# Patient Record
Sex: MALE | Race: WHITE | Employment: OTHER | URBAN - METROPOLITAN AREA
[De-identification: names, ages, dates, MRNs, and addresses within clinical notes are randomized per-mention and may not be internally consistent; named-entity substitution may affect disease eponyms.]

---

## 2017-07-10 ENCOUNTER — HOSPITAL ENCOUNTER (OUTPATIENT)
Dept: CT IMAGING | Age: 67
Discharge: HOME | End: 2017-07-10
Attending: SPECIALIST
Payer: MEDICARE

## 2017-07-10 DIAGNOSIS — R29.6 FALLING: ICD-10-CM

## 2017-07-10 PROCEDURE — 70450 CT HEAD/BRAIN W/O DYE: CPT

## 2017-12-05 ENCOUNTER — OFFICE VISIT (OUTPATIENT)
Dept: NEUROLOGY | Age: 67
End: 2017-12-05

## 2017-12-05 VITALS
TEMPERATURE: 98 F | OXYGEN SATURATION: 98 % | RESPIRATION RATE: 16 BRPM | HEART RATE: 74 BPM | BODY MASS INDEX: 24.31 KG/M2 | WEIGHT: 179.5 LBS | SYSTOLIC BLOOD PRESSURE: 98 MMHG | HEIGHT: 72 IN | DIASTOLIC BLOOD PRESSURE: 66 MMHG

## 2017-12-05 DIAGNOSIS — G43.109 MIGRAINE AURA WITHOUT HEADACHE: Primary | ICD-10-CM

## 2017-12-05 DIAGNOSIS — H53.9 VISUAL DISTURBANCE: ICD-10-CM

## 2017-12-05 DIAGNOSIS — R41.3 MEMORY LOSS: ICD-10-CM

## 2017-12-05 RX ORDER — ESCITALOPRAM OXALATE 5 MG/1
5 TABLET ORAL DAILY
Qty: 30 TAB | Refills: 5 | Status: SHIPPED | OUTPATIENT
Start: 2017-12-05 | End: 2018-01-17

## 2017-12-07 NOTE — PROGRESS NOTES
Valentina Escudero is a 69 y.o. male who presents with the following  Chief Complaint   Patient presents with    Memory Loss       HPI Patient comes in for a new complaint of memory loss with accompanying migraines, tremor, visual disturbance, and mood changes. His wife accompanies him today. He states for a while now he has been having trouble with his memory in that he can not concentrate, focus, multitask, remember short term things. Has trouble remembering what he did earlier in the day or what he ate for breakfast. Things have gotten worse over the past few months. He remember having trouble with his memory for years now but recently gotten a lot more noticeable. He states he has a half brother with dementia. He is very anxious and when this acts up his memory is even worse. He had a head CT in which he thought we ordered and got the results but we do not have this we will try to get it from the imaging center. He states his migraines are a few times a week and can last all day and all night. He states the pain is pressure. It can move but mostly is the entire head. He gets some visual disturbance with these headaches but was also told by his PCP they wanted him to come get a neurological eye exam because he has a field cut. Can not get a MRI brain due to a pacemaker. He states he has had trouble with his vision for a while now also. But will have symptoms get worse with his migraines. He also has a tremor which is intentional and resting per his report. He has trouble with balance, and action movement such as eating, pouring. No new medications or diet changes. He is sleeping and eating well. No other changes. Allergies   Allergen Reactions    Gantrisin Rash       Current Outpatient Prescriptions   Medication Sig    escitalopram oxalate (LEXAPRO) 5 mg tablet Take 1 Tab by mouth daily.  aspirin delayed-release 81 mg tablet Take  by mouth daily.     levothyroxine (SYNTHROID) 125 mcg tablet Take  by mouth Daily (before breakfast). No current facility-administered medications for this visit. History   Smoking Status    Never Smoker   Smokeless Tobacco    Not on file       Past Medical History:   Diagnosis Date    Anxiety     Aortic insufficiency regurgitation 11/9/2010    Arthritis     neck    Atrial flutter (HCC)     cardioversion 5/18/11    BPH (benign prostatic hyperplasia)     Hypothyroid     Migraines     mobitz 1, atrioventricular block 11/9/2010       Past Surgical History:   Procedure Laterality Date    ECHO 2D ADULT  9/2010    normal LV wall motion and ejection fraction, left ventricular enlargement, left atrial enlargement, severe aortic regurgitation and mild mitral regurgitation. The ejection fraction was 55-60%.  ECHO 2D ADULT  2/2011    mild LVE, EF 58%, LAE, large Ao root, Severe AI, 2+MR    ECHO 2D ADULT  6/2011    LVH, EF 60%, trace -mild MR    HX CATARACT REMOVAL      RIGHT EYE    HX GI      inginal hernia repair 2008    HX HEART CATHETERIZATION  1987    normal coronary arteries normal left ventricular function.  HX HEART CATHETERIZATION  2011    Normal coronaries    HX HEART VALVE SURGERY  4/2011    AVR, #[address] Epic porcine    HX HERNIA REPAIR      HX PACEMAKER  1987    VVI, after stopping \"voltarin\" his block resolved.  Subsequently battery depleted in 2002 and was not replaced       Family History   Problem Relation Age of Onset    Alcohol abuse Mother     Lung Disease Mother      EMPHYSEMA    Cancer Mother      LUNG CA    Stroke Mother     Heart Disease Father      MI       Social History     Social History    Marital status:      Spouse name: N/A    Number of children: N/A    Years of education: N/A     Social History Main Topics    Smoking status: Never Smoker    Smokeless tobacco: None    Alcohol use Yes      Comment: OCCASIONAL ETOH    Drug use: Yes     Special: Prescription    Sexual activity: Not Asked     Other Topics Concern    None     Social History Narrative       Review of Systems   Constitutional: Positive for malaise/fatigue. HENT: Negative for ear pain, hearing loss and tinnitus. Eyes: Positive for blurred vision, double vision and photophobia. Respiratory: Negative for shortness of breath and wheezing. Cardiovascular: Negative for chest pain and palpitations. Gastrointestinal: Positive for nausea. Negative for vomiting. Neurological: Positive for weakness and headaches. Negative for dizziness, tingling, tremors and loss of consciousness. Psychiatric/Behavioral: Positive for memory loss. Negative for depression and hallucinations. The patient is nervous/anxious. The patient does not have insomnia. Remainder of comprehensive review is negative. Physical Exam :    Visit Vitals    BP 98/66 (BP 1 Location: Left arm, BP Patient Position: Sitting)    Pulse 74    Temp 98 °F (36.7 °C) (Oral)    Resp 16    Ht 6' (1.829 m)    Wt 81.4 kg (179 lb 8 oz)    SpO2 98%    BMI 24.34 kg/m2       General: Well defined, nourished, and groomed individual in no acute distress.    Neck: Supple, nontender, no bruits, no pain with resistance to active range of motion.    Heart: Regular rate and rhythm, no murmurs, rub, or gallop. Normal S1S2. Lungs: Clear to auscultation bilaterally with equal chest expansion, no cough, no wheeze  Musculoskeletal: Extremities revealed no edema and had full range of motion of joints.    Psych: Good mood and bright affect    NEUROLOGICAL EXAMINATION:    Mental Status: Alert and oriented to person, place, and time. mmse 26     Cranial Nerves:    II, III, IV, VI: Visual acuity grossly intact. Visual fields are normal.    Pupils are equal, round, and reactive to light and accommodation.    Extra-ocular movements are full and fluid. Fundoscopic exam was benign, no ptosis or nystagmus.    V-XII: Hearing is grossly intact.  Facial features are symmetric, with normal sensation and TROPONIN I   Result Value Ref Range    Troponin-I, Qt. <0.04 <9.34 ng/mL   METABOLIC PANEL, COMPREHENSIVE   Result Value Ref Range    Sodium 140 136 - 145 MMOL/L    Potassium 4.3 3.5 - 5.1 MMOL/L    Chloride 104 97 - 108 MMOL/L    CO2 30 21 - 32 MMOL/L    Anion gap 6 5 - 15 mmol/L    Glucose 97 65 - 100 MG/DL    BUN 18 6 - 20 MG/DL    Creatinine 1.05 0.45 - 1.15 MG/DL    BUN/Creatinine ratio 17 12 - 20      GFR est AA >60 >60 ml/min/1.73m2    GFR est non-AA >60 >60 ml/min/1.73m2    Calcium 8.9 8.5 - 10.1 MG/DL    Bilirubin, total 0.6 0.2 - 1.0 MG/DL    ALT (SGPT) 26 12 - 78 U/L    AST (SGOT) 25 15 - 37 U/L    Alk.  phosphatase 66 50 - 136 U/L    Protein, total 6.6 6.4 - 8.2 g/dL    Albumin 3.7 3.5 - 5.0 g/dL    Globulin 2.9 2.0 - 4.0 g/dL    A-G Ratio 1.3 1.1 - 2.2     LIPID PANEL   Result Value Ref Range    LIPID PROFILE         Cholesterol, total 158 <200 MG/DL    Triglyceride 179 (H) <150 MG/DL    HDL Cholesterol 32 MG/DL    LDL, calculated 90.2 0 - 100 MG/DL    VLDL, calculated 35.8 MG/DL    CHOL/HDL Ratio 4.9 0 - 5.0     CK W/ REFLX CKMB   Result Value Ref Range     39 - 308 U/L   TROPONIN I   Result Value Ref Range    Troponin-I, Qt. <0.04 <0.05 ng/mL   PROTHROMBIN TIME   Result Value Ref Range    INR 1.4 (H) 0.9 - 1.1      Prothrombin time 15.0 (H) 9.4 - 11.7 sec   EKG, 12 LEAD, INITIAL   Result Value Ref Range    Ventricular Rate 54 BPM    Atrial Rate 54 BPM    P-R Interval 410 ms    QRS Duration 98 ms    Q-T Interval 406 ms    QTC Calculation (Bezet) 385 ms    Calculated P Axis 51 degrees    Calculated R Axis -27 degrees    Calculated T Axis 25 degrees    Diagnosis       Sinus bradycardia with 1st degree AV block  Possible Left atrial enlargement  Borderline ECG  When compared with ECG of 16-MAY-2011 17:41,  MANUAL COMPARISON REQUIRED, DATA IS UNCONFIRMED  Confirmed by [id number], Gisele YUAN, Torey ([id number]) on 4/4/2013 6:49:59 AM   EKG, 12 LEAD, INITIAL   Result Value Ref Range    Ventricular Rate 54 BPM Atrial Rate 54 BPM    P-R Interval 398 ms    QRS Duration 90 ms    Q-T Interval 412 ms    QTC Calculation (Bezet) 390 ms    Calculated P Axis 68 degrees    Calculated R Axis -31 degrees    Calculated T Axis 49 degrees    Diagnosis       Sinus bradycardia with 1st degree AV block  Possible Left atrial enlargement  Left axis deviation  Abnormal ECG  When compared with ECG of 03-APR-2013 15:57,  MANUAL COMPARISON REQUIRED, DATA IS UNCONFIRMED  Confirmed by Renan Vazquez M.D., Columba Burns ([id number]) on 4/4/2013 11:28:43 AM       Orders Placed This Encounter    DUPLEX CAROTID BILATERAL AMB NEURO ([id number])     Standing Status:   Future     Standing Expiration Date:   12/5/2018     Order Specific Question:   Reason for Exam:     Answer:   memory loss    REFERRAL TO NEUROLOGY     Referral Priority:   Routine     Referral Type:   Consultation     Referral Reason:   Specialty Services Required     Referred to Provider:   Alec Lira MD    REFERRAL TO NEUROPSYCHOLOGY     Referral Priority:   Routine     Referral Type:   Consultation     Referral Reason:   Specialty Services Required     Referred to Provider:   Marian Wells PsyD    EEG AMB NEURO ([id number])     Standing Status:   Future     Standing Expiration Date:   6/5/2018     Order Specific Question:   Reason for Exam:     Answer:   memory loss    escitalopram oxalate (LEXAPRO) 5 mg tablet     Sig: Take 1 Tab by mouth daily. Dispense:  30 Tab     Refill:  5       1. Migraine aura without headache    2. Memory loss    3. Visual disturbance        Follow-up Disposition: Not on File     multiple complaints of memory loss, migraines, and visual changes. In regards to memory we will want to get the CT head to evaluate for stroke vs. Other etiology. We will also get an EEG and doppler. He said he has had blood work recently with PCP. It seems his mood makes things worse so we will also try some low dose Lexapro for this and his headache prevention.  We will also send to  Dodie Aggarwal for a visual field test as he has seen Dr. Dodie Aggarwal in the past it seems for a similar complaint. He will also get a referral to neuropsych to evaluate for other sources of memory loss looking at dementia vs. Other etiology. We will get all his records hopefully and can not do an MRI brain due to pacemaker. Stay active. We discussed medications, POC and he will follow up after.          This note will not be viewable in [employer]

## 2017-12-18 ENCOUNTER — OFFICE VISIT (OUTPATIENT)
Dept: NEUROLOGY | Age: 67
End: 2017-12-18

## 2017-12-18 DIAGNOSIS — R41.3 MEMORY LOSS: ICD-10-CM

## 2017-12-18 DIAGNOSIS — G43.109 MIGRAINE AURA WITHOUT HEADACHE: ICD-10-CM

## 2017-12-18 DIAGNOSIS — R56.9 SEIZURE (HCC): Primary | ICD-10-CM

## 2017-12-20 ENCOUNTER — OFFICE VISIT (OUTPATIENT)
Dept: NEUROLOGY | Age: 67
End: 2017-12-20

## 2017-12-20 VITALS
SYSTOLIC BLOOD PRESSURE: 110 MMHG | BODY MASS INDEX: 24.15 KG/M2 | DIASTOLIC BLOOD PRESSURE: 64 MMHG | HEIGHT: 72 IN | WEIGHT: 178.3 LBS | TEMPERATURE: 98.2 F | RESPIRATION RATE: 18 BRPM | HEART RATE: 59 BPM | OXYGEN SATURATION: 95 %

## 2017-12-20 DIAGNOSIS — H53.9 VISUAL CHANGES: Primary | ICD-10-CM

## 2017-12-20 NOTE — PROGRESS NOTES
Patient was specifically referred regarding visual changes as noted in recent encounter. Includes what I heard years ago remotely as acephalgia migraine visual aura and what sounds like visual snow a phenomena of ever present interference pattern that he sees with eyes open especially in the dark and also with eyes closed. 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 Patient's exam looks good today and I made no outstanding discoveries all things considered. 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 Please see visual encounter sheet filled out and scanned into this encounter.

## 2017-12-20 NOTE — PATIENT INSTRUCTIONS
[facility]   Statement to Patients  April 1, 2014      In an effort to ensure the large volume of patient prescription refills is processed in the most efficient and expeditious manner, we are asking our patients to assist us by calling your Pharmacy for all prescription refills, this will include also your  Mail Order Pharmacy. The pharmacy will contact our office electronically to continue the refill process. Please do not wait until the last minute to call your pharmacy. We need at least 48 hours (2days) to fill prescriptions. We also encourage you to call your pharmacy before going to  your prescription to make sure it is ready. With regard to controlled substance prescription refill requests (narcotic refills) that need to be picked up at our office, we ask your cooperation by providing us with at least 72 hours (3days) notice that you will need a refill. We will not refill narcotic prescription refill requests after 4:00pm on any weekday, Monday through Thursday, or after 2:00pm on Fridays, or on the weekends. We encourage everyone to explore another way of getting your prescription refill request processed using RxResults, our patient web portal through our electronic medical record system. RxResults is an efficient and effective way to communicate your medication request directly to the office and  downloadable as an rikki on your smart phone . [facility]Results also features a review functionality that allows you to view your medication list as well as leave messages for your physician. Are you ready to get connected? If so please review the attatched instructions or speak to any of our staff to get you set up right away! Thank you so much for your cooperation. Should you have any questions please contact our Practice Administrator.     The Physicians and Staff,  [facility] [address]  What is a living will?    A living will is a legal form you use to write down the kind of care you want at the end of your life. It is used by the health professionals who will treat you if you aren't able to decide for yourself. If you put your wishes in writing, your loved ones and others will know what kind of care you want. They won't need to guess. This can ease your mind and be helpful to others. A living will is not the same as an estate or property will. An estate will explains what you want to happen with your money and property after you die. Is a living will a legal document? A living will is a legal document. Each state has its own laws about living bae. If you move to another state, make sure that your living will is legal in the state where you now live. Or you might use a universal form that has been approved by many states. This kind of form can sometimes be completed and stored online. Your electronic copy will then be available wherever you have a connection to the Internet. In most cases, doctors will respect your wishes even if you have a form from a different state. · You don't need an  to complete a living will. But legal advice can be helpful if your state's laws are unclear, your health history is complicated, or your family can't agree on what should be in your living will. · You can change your living will at any time. Some people find that their wishes about end-of-life care change as their health changes. · In addition to making a living will, think about completing a medical power of  form. This form lets you name the person you want to make end-of-life treatment decisions for you (your \"health care agent\") if you're not able to. Many hospitals and nursing homes will give you the forms you need to complete a living will and a medical power of . · Your living will is used only if you can't make or communicate decisions for yourself anymore.  If you become able to make decisions again, you can accept or refuse any treatment, no matter what you wrote in your living will. · Your state may offer an online registry. This is a place where you can store your living will online so the doctors and nurses who need to treat you can find it right away. What should you think about when creating a living will? Talk about your end-of-life wishes with your family members and your doctor. Let them know what you want. That way the people making decisions for you won't be surprised by your choices. Think about these questions as you make your living will:  · Do you know enough about life support methods that might be used? If not, talk to your doctor so you know what might be done if you can't breathe on your own, your heart stops, or you're unable to swallow. · What things would you still want to be able to do after you receive life-support methods? Would you want to be able to walk? To speak? To eat on your own? To live without the help of machines? · If you have a choice, where do you want to be cared for? In your home? At a hospital or nursing home? · Do you want certain Hindu practices performed if you become very ill? · If you have a choice at the end of your life, where would you prefer to die? At home? In a hospital or nursing home? Somewhere else? · Would you prefer to be buried or cremated? · Do you want your organs to be donated after you die? What should you do with your living will? · Make sure that your family members and your health care agent have copies of your living will. · Give your doctor a copy of your living will to keep in your medical record. If you have more than one doctor, make sure that each one has a copy. · You may want to put a copy of your living will where it can be easily found. Where can you learn more? Go to http://mackenzie-priscilla.info/. Enter D361 in the search box to learn more about \"Learning About Living [place]. \"  Current as of: September 24, 2016  Content Version: 11.4  © 2557-4460 [employer], [employer]. Care instructions adapted under license by [employer] (which disclaims liability or warranty for this information). If you have questions about a medical condition or this instruction, always ask your healthcare professional. [facility] [address] any warranty or liability for your use of this information. Patient examined and information within note section as well as the visual encounter sheet scanned into EMR. Should follow-up with the nurse practitioner as otherwise planned.

## 2017-12-20 NOTE — MR AVS SNAPSHOT
Visit Information Date & Time Provider Department Dept. Phone Encounter #  
 12/20/2017 11:20 PM Marylen Gale, MD [facility] Neurology [facility] [phone] [id number] Your Appointments 12/20/2017 11:20 PM  
Follow Up with Marylen Gale, MD  
[facility]) Appt Note: visual field test cr; visual field test cr  
 [address] [address] [phone]  
  
   
 [facility]  
  
    
 12/22/2017  9:00 AM  
ULTRASOUND with DOPPLER [facility]) Appt Note: r/s because of the system being broken cjr 12/18/17 [place] [address] [address] [phone]  
  
   
 [place] [address] 12/29/2017  9:20 AM  
Follow Up with Marylen Gale, MD  
[facility]) Appt Note: f/up after testing cr  
 [address] [address] [phone] 5/14/2018  1:40 PM  
New Patient with Rasheed Barnett PsyD [facility]) Appt Note: new patient memory loss/ Rg Kevin [place] [address] [address] [phone]  
  
   
 [place] [address] [id number] [address] [place] Upcoming Health Maintenance Date Due Hepatitis C Screening 1950 DTaP/Tdap/Td series (1 - Tdap) 10/6/1969 FOBT Q 1 YEAR AGE 50-75 10/6/1998 ZOSTER VACCINE AGE 60> 8/6/2008 GLAUCOMA SCREENING Q2Y 10/6/2013 Pneumococcal 65+ Low/Medium Risk (1 of 2 - PCV13) 10/6/2013 MEDICARE YEARLY EXAM 10/6/2013 Influenza Age 5 to Adult 8/1/2017 Allergies as of 12/20/2017  Review Complete On: 12/20/2017 By: Marylen Gale, MD  
  
 Severity Noted Reaction Type Reactions Dianaisin High 11/09/2010   Systemic Rash Current Immunizations  Reviewed on 5/16/2011 Name Date Influenza Vaccine Whole 10/1/2010 Not reviewed this visit You Were Diagnosed With   
  
 Codes Comments Visual changes    -  Primary ICD-10-CM: H53.9 ICD-9-CM: 368.9 Vitals BP Pulse Temp Resp Height(growth percentile) Weight(growth percentile) 110/64 (!) 59 98.2 °F (36.8 °C) (Oral) 18 6' (1.829 m) 178 lb 4.8 oz (80.9 kg) SpO2 BMI Smoking Status 95% 24.18 kg/m2 Never Smoker Vitals History BMI and BSA Data Body Mass Index Body Surface Area  
 24.18 kg/m 2 2.03 m 2 Preferred Pharmacy Pharmacy Name Phone [facility] #[id number] [address] [phone] Your Updated Medication List  
  
   
This list is accurate as of: 12/20/17 12:25 PM.  Always use your most recent med list.  
  
  
  
  
 aspirin delayed-release 81 mg tablet Take  by mouth daily. escitalopram oxalate 5 mg tablet Commonly known as:  Cait Wellington Take 1 Tab by mouth daily. levothyroxine 125 mcg tablet Commonly known as:  SYNTHROID Take  by mouth Daily (before breakfast). We Performed the Following [place] VISUAL FIELD EXAM,EXTENDED [44848 CPT(R)] Patient Instructions PRESCRIPTION REFILL POLICY [facility] Statement to Patients April 1, 2014 In an effort to ensure the large volume of patient prescription refills is processed in the most efficient and expeditious manner, we are asking our patients to assist us by calling your Pharmacy for all prescription refills, this will include also your  Mail Order Pharmacy. The pharmacy will contact our office electronically to continue the refill process. Please do not wait until the last minute to call your pharmacy. We need at least 48 hours (2days) to fill prescriptions.  We also encourage you to call your pharmacy before going to  your prescription to make sure it is ready. With regard to controlled substance prescription refill requests (narcotic refills) that need to be picked up at our office, we ask your cooperation by providing us with at least 72 hours (3days) notice that you will need a refill. We will not refill narcotic prescription refill requests after 4:00pm on any weekday, Monday through Thursday, or after 2:00pm on Fridays, or on the weekends. We encourage everyone to explore another way of getting your prescription refill request processed using LYYN, our patient web portal through our electronic medical record system. LYYN is an efficient and effective way to communicate your medication request directly to the office and  downloadable as an rikki on your smart phone . LYYN also features a review functionality that allows you to view your medication list as well as leave messages for your physician. Are you ready to get connected? If so please review the attatched instructions or speak to any of our staff to get you set up right away! Thank you so much for your cooperation. Should you have any questions please contact our Practice Administrator. The Physicians and Staff,  [facility] Em[address] What is a living will? A living will is a legal form you use to write down the kind of care you want at the end of your life. It is used by the health professionals who will treat you if you aren't able to decide for yourself. If you put your wishes in writing, your loved ones and others will know what kind of care you want. They won't need to guess. This can ease your mind and be helpful to others. A living will is not the same as an estate or property will. An estate will explains what you want to happen with your money and property after you die. Is a living will a legal document? A living will is a legal document. Each state has its own laws about living bae. If you move to another state, make sure that your living will is legal in the state where you now live. Or you might use a universal form that has been approved by many states. This kind of form can sometimes be completed and stored online. Your electronic copy will then be available wherever you have a connection to the Internet. In most cases, doctors will respect your wishes even if you have a form from a different state. · You don't need an  to complete a living will. But legal advice can be helpful if your state's laws are unclear, your health history is complicated, or your family can't agree on what should be in your living will. · You can change your living will at any time. Some people find that their wishes about end-of-life care change as their health changes. · In addition to making a living will, think about completing a medical power of  form. This form lets you name the person you want to make end-of-life treatment decisions for you (your \"health care agent\") if you're not able to. Many hospitals and nursing homes will give you the forms you need to complete a living will and a medical power of . · Your living will is used only if you can't make or communicate decisions for yourself anymore. If you become able to make decisions again, you can accept or refuse any treatment, no matter what you wrote in your living will. · Your state may offer an online registry. This is a place where you can store your living will online so the doctors and nurses who need to treat you can find it right away. What should you think about when creating a living will? Talk about your end-of-life wishes with your family members and your doctor. Let them know what you want. That way the people making decisions for you won't be surprised by your choices. Think about these questions as you make your living will: · Do you know enough about life support methods that might be used? If not, talk to your doctor so you know what might be done if you can't breathe on your own, your heart stops, or you're unable to swallow. · What things would you still want to be able to do after you receive life-support methods? Would you want to be able to walk? To speak? To eat on your own? To live without the help of machines? · If you have a choice, where do you want to be cared for? In your home? At a hospital or nursing home? · Do you want certain Anabaptist practices performed if you become very ill? · If you have a choice at the end of your life, where would you prefer to die? At home? In a hospital or nursing home? Somewhere else? · Would you prefer to be buried or cremated? · Do you want your organs to be donated after you die? What should you do with your living will? · Make sure that your family members and your health care agent have copies of your living will. · Give your doctor a copy of your living will to keep in your medical record. If you have more than one doctor, make sure that each one has a copy. · You may want to put a copy of your living will where it can be easily found. Where can you learn more? Go to http://mackenzie-priscilla.info/. Enter E997 in the search box to learn more about \"Learning About Living Megan. \" Current as of: September 24, 2016 Content Version: 11.4 © 0334-1284 [employer]. Care instructions adapted under license by immoture.be (which disclaims liability or warranty for this information). If you have questions about a medical condition or this instruction, always ask your healthcare professional. [place] [address] any warranty or liability for your use of this information. Patient examined and information within note section as well as the visual encounter sheet scanned into EMR.   Should follow-up with the nurse practitioner as otherwise planned. Introducing [facility]! [facility] introduces NGI patient portal. Now you can access parts of your medical record, email your doctor's office, and request medication refills online. 1. In your internet browser, go to [email or web address]. [email or web address]part[employer] Systemst 2. Click on the First Time User? Click Here link in the Sign In box. You will see the New Member Sign Up page. 3. Enter your NGI Access Code exactly as it appears below. You will not need to use this code after youve completed the sign-up process. If you do not sign up before the expiration date, you must request a new code. · NGI Access Code: [email or web address] Expires: 3/5/2018  2:26 PM 
 
4. Enter the last four digits of your Social Security Number (xxxx) and Date of Birth (mm/dd/yyyy) as indicated and click Submit. You will be taken to the next sign-up page. 5. Create a NGI ID. This will be your NGI login ID and cannot be changed, so think of one that is secure and easy to remember. 6. Create a NGI password. You can change your password at any time. 7. Enter your Password Reset Question and Answer. This can be used at a later time if you forget your password. 8. Enter your e-mail address. You will receive e-mail notification when new information is available in [address] 9. Click Sign Up. You can now view and download portions of your medical record. 10. Click the Download Summary menu link to download a portable copy of your medical information. If you have questions, please visit the Frequently Asked Questions section of the NGI website. Remember, NGI is NOT to be used for urgent needs. For medical emergencies, dial 911. Now available from your iPhone and Android! Please provide this summary of care documentation to your next provider. Your primary care clinician is listed as Randi Real.  If you have any questions after today's visit, please call [phone].

## 2017-12-22 ENCOUNTER — OFFICE VISIT (OUTPATIENT)
Dept: NEUROLOGY | Age: 67
End: 2017-12-22

## 2017-12-22 DIAGNOSIS — I65.23 BILATERAL CAROTID ARTERY STENOSIS: Primary | ICD-10-CM

## 2017-12-22 DIAGNOSIS — R41.3 MEMORY LOSS: ICD-10-CM

## 2017-12-22 DIAGNOSIS — G43.109 MIGRAINE AURA WITHOUT HEADACHE: ICD-10-CM

## 2017-12-25 NOTE — PROCEDURES
EEG:      Date:  12/18/17    Requesting Physician:  Claude Lawman, NP     An EEG is requested in this 69 y.o. gentleman with paroxysms suspicious for seizure to evaluate for epileptiform abnormality. Her medications are listed as Lexapro and Synthroid. This tracing is obtained during the awake and drowsy states. During wakefulness, the background consists predominantly of low voltage fast frequency beta wave activity. Hyperventilation not performed due to age and medical history. Intermittent photic stimulation little alters the tracing. During drowsiness, the background rhythms attenuate and are replaced with diffuse symmetric theta range activities. Later stages of sleep are not obtained. Interpretation: This EEG recorded during the awake and drowsy states is normal.  No epileptiform abnormalities are seen.

## 2017-12-26 NOTE — PROCEDURES
Carotid Doppler:     Date:  12/22/17    Requesting Physician:  Merritt Kussmaul, MD     Indication:  Stenosis. B-mode imaging reveals mild plaque at the bifurcations extending into the interna carotid artery segments bilaterally. Doppler spectral analysis reveals no elevated velocity shifts. Vertebral artery flow antegrade bilaterally. Interpretation:  Plaque as noted. Stenosis less than 50% bilateral ICA.

## 2017-12-28 ENCOUNTER — TELEPHONE (OUTPATIENT)
Dept: NEUROLOGY | Age: 67
End: 2017-12-28

## 2017-12-28 RX ORDER — ESCITALOPRAM OXALATE 10 MG/1
10 TABLET ORAL DAILY
Qty: 30 TAB | Refills: 5 | Status: SHIPPED | OUTPATIENT
Start: 2017-12-28 | End: 2018-06-19

## 2017-12-28 NOTE — TELEPHONE ENCOUNTER
escitalopram oxalate (LEXAPRO) 5 mg tablet  Wife of the patient contacted the pharmacy and stated the medication is not really working or does not notice any difference since he has been taking it. Pharmacy ([facility] -[phone])  advised maybe increasing the dosage for the patient. Please contact the patient with the best advise. If you have any questions it is okay to call patient. Has the same symptoms that he had when coming into the practice.     Thank you

## 2017-12-28 NOTE — TELEPHONE ENCOUNTER
Called and spoke to patients wife. Informed her that script was sent in to pharmacy. She states understanding and will call with any further concerns.      Per NP:  Sent 10 mg to [facility]     Start taking 10 mg daily       Can use up 5 mg pills and start new dosing  (Routing comment)

## 2018-01-17 ENCOUNTER — OFFICE VISIT (OUTPATIENT)
Dept: NEUROLOGY | Age: 68
End: 2018-01-17

## 2018-01-17 VITALS
BODY MASS INDEX: 24.38 KG/M2 | HEIGHT: 72 IN | WEIGHT: 180 LBS | DIASTOLIC BLOOD PRESSURE: 62 MMHG | SYSTOLIC BLOOD PRESSURE: 104 MMHG

## 2018-01-17 DIAGNOSIS — G43.109 MIGRAINE AURA WITHOUT HEADACHE: Primary | ICD-10-CM

## 2018-01-17 DIAGNOSIS — H53.9 VISUAL DISTURBANCES: ICD-10-CM

## 2018-01-17 NOTE — PROGRESS NOTES
Phill Fernandez is a 69 y.o. male who presents with the following  Chief Complaint   Patient presents with    Results       HPI Patient comes in for a new complaint of memory loss with accompanying visual migraines, tremor, visual disturbance, and mood changes. His wife accompanies him today. He states for a while now he has been having trouble with his memory in that he can not concentrate, focus, multitask, remember short term things. Has trouble remembering what he did earlier in the day or what he ate for breakfast. Things have gotten worse over the past few months. He remember having trouble with his memory for years now but recently gotten a lot more noticeable. He states he has a half brother with dementia. He is very anxious and when this acts up his memory is even worse. He states he has had trouble with his vision for a while now also. But will have symptoms get worse with his migraines. He also has a tremor which is intentional and resting per his report only in left arm . He has trouble with balance, and action movement such as eating, pouring. No new medications or diet changes. He is sleeping and eating well. Allergies   Allergen Reactions    Gantrisin Rash       Current Outpatient Prescriptions   Medication Sig    escitalopram oxalate (LEXAPRO) 10 mg tablet Take 1 Tab by mouth daily.  aspirin delayed-release 81 mg tablet Take  by mouth daily.  levothyroxine (SYNTHROID) 125 mcg tablet Take  by mouth Daily (before breakfast). No current facility-administered medications for this visit.         History   Smoking Status    Never Smoker   Smokeless Tobacco    Never Used       Past Medical History:   Diagnosis Date    Anxiety     Aortic insufficiency regurgitation 11/9/2010    Arthritis     neck    Atrial flutter (HCC)     cardioversion 5/18/11    BPH (benign prostatic hyperplasia)     Hypothyroid     Migraines     mobitz 1, atrioventricular block 11/9/2010       Past Surgical History:   Procedure Laterality Date    ECHO 2D ADULT  9/2010    normal LV wall motion and ejection fraction, left ventricular enlargement, left atrial enlargement, severe aortic regurgitation and mild mitral regurgitation. The ejection fraction was 55-60%.  ECHO 2D ADULT  2/2011    mild LVE, EF 58%, LAE, large Ao root, Severe AI, 2+MR    ECHO 2D ADULT  6/2011    LVH, EF 60%, trace -mild MR    HX CATARACT REMOVAL      RIGHT EYE    HX GI      inginal hernia repair 2008    HX HEART CATHETERIZATION  1987    normal coronary arteries normal left ventricular function.  HX HEART CATHETERIZATION  2011    Normal coronaries    HX HEART VALVE SURGERY  4/2011    AVR, #[address] [place] Epic porcine    HX HERNIA REPAIR      HX PACEMAKER  1987    VVI, after stopping \"voltarin\" his block resolved. Subsequently battery depleted in 2002 and was not replaced       Family History   Problem Relation Age of Onset    Alcohol abuse Mother     Lung Disease Mother      EMPHYSEMA    Cancer Mother      LUNG CA    Stroke Mother     Heart Disease Father      MI       Social History     Social History    Marital status:      Spouse name: N/A    Number of children: N/A    Years of education: N/A     Social History Main Topics    Smoking status: Never Smoker    Smokeless tobacco: Never Used    Alcohol use Yes      Comment: OCCASIONAL ETOH    Drug use: Yes     Special: Prescription    Sexual activity: Not Asked     Other Topics Concern    None     Social History Narrative       Review of Systems   HENT: Negative for ear pain, hearing loss and tinnitus. Eyes: Positive for blurred vision, double vision and photophobia. Respiratory: Negative for shortness of breath and wheezing. Cardiovascular: Negative for chest pain and palpitations. Gastrointestinal: Negative for nausea and vomiting. Neurological: Negative for dizziness, seizures, loss of consciousness, weakness and headaches.    Psychiatric/Behavioral: Positive for memory loss. Remainder of comprehensive review is negative. Physical Exam :    Visit Vitals    /62    Ht 6' (1.829 m)    Wt 81.6 kg (180 lb)    BMI 24.41 kg/m2               Results for orders placed or performed during the hospital encounter of 04/03/13   CBC WITH AUTOMATED DIFF   Result Value Ref Range    WBC 6.4 4.1 - 11.1 K/uL    RBC 5.43 4.10 - 5.70 M/uL    HGB 16.1 12.1 - 17.0 g/dL    HCT 46.1 36.6 - 50.3 %    MCV 84.9 80.0 - 99.0 FL    MCH 29.7 26.0 - 34.0 PG    MCHC 34.9 30.0 - 36.5 g/dL    RDW 12.9 11.5 - 14.5 %    PLATELET 603 069 - 907 K/uL    NEUTROPHILS 67 32 - 75 %    LYMPHOCYTES 21 12 - 49 %    MONOCYTES 8 5 - 13 %    EOSINOPHILS 3 0 - 7 %    BASOPHILS 1 0 - 1 %    ABS. NEUTROPHILS 4.3 1.8 - 8.0 K/UL    ABS. LYMPHOCYTES 1.3 0.8 - 3.5 K/UL    ABS. MONOCYTES 0.5 0.0 - 1.0 K/UL    ABS. EOSINOPHILS 0.2 0.0 - 0.4 K/UL    ABS. BASOPHILS 0.0 0.0 - 0.1 K/UL   CK W/ CKMB & INDEX   Result Value Ref Range    CK - MB 2.9 0.5 - 3.6 NG/ML    CK-MB Index 1.2 0 - 2.5       39 - 081 U/L   METABOLIC PANEL, COMPREHENSIVE   Result Value Ref Range    Sodium 141 136 - 145 MMOL/L    Potassium 4.3 3.5 - 5.1 MMOL/L    Chloride 104 97 - 108 MMOL/L    CO2 29 21 - 32 MMOL/L    Anion gap 8 5 - 15 mmol/L    Glucose 96 65 - 100 MG/DL    BUN 18 6 - 20 MG/DL    Creatinine 1.05 0.45 - 1.15 MG/DL    BUN/Creatinine ratio 17 12 - 20      GFR est AA >60 >60 ml/min/1.73m2    GFR est non-AA >60 >60 ml/min/1.73m2    Calcium 8.8 8.5 - 10.1 MG/DL    Bilirubin, total 0.7 0.2 - 1.0 MG/DL    ALT (SGPT) 27 12 - 78 U/L    AST (SGOT) 24 15 - 37 U/L    Alk.  phosphatase 65 50 - 136 U/L    Protein, total 6.7 6.4 - 8.2 g/dL    Albumin 3.7 3.5 - 5.0 g/dL    Globulin 3.0 2.0 - 4.0 g/dL    A-G Ratio 1.2 1.1 - 2.2     TROPONIN I   Result Value Ref Range    Troponin-I, Qt. <0.04 <0.35 ng/mL   METABOLIC PANEL, COMPREHENSIVE   Result Value Ref Range    Sodium 140 136 - 145 MMOL/L    Potassium 4.3 3.5 - 5.1 MMOL/L Chloride 104 97 - 108 MMOL/L    CO2 30 21 - 32 MMOL/L    Anion gap 6 5 - 15 mmol/L    Glucose 97 65 - 100 MG/DL    BUN 18 6 - 20 MG/DL    Creatinine 1.05 0.45 - 1.15 MG/DL    BUN/Creatinine ratio 17 12 - 20      GFR est AA >60 >60 ml/min/1.73m2    GFR est non-AA >60 >60 ml/min/1.73m2    Calcium 8.9 8.5 - 10.1 MG/DL    Bilirubin, total 0.6 0.2 - 1.0 MG/DL    ALT (SGPT) 26 12 - 78 U/L    AST (SGOT) 25 15 - 37 U/L    Alk.  phosphatase 66 50 - 136 U/L    Protein, total 6.6 6.4 - 8.2 g/dL    Albumin 3.7 3.5 - 5.0 g/dL    Globulin 2.9 2.0 - 4.0 g/dL    A-G Ratio 1.3 1.1 - 2.2     LIPID PANEL   Result Value Ref Range    LIPID PROFILE         Cholesterol, total 158 <200 MG/DL    Triglyceride 179 (H) <150 MG/DL    HDL Cholesterol 32 MG/DL    LDL, calculated 90.2 0 - 100 MG/DL    VLDL, calculated 35.8 MG/DL    CHOL/HDL Ratio 4.9 0 - 5.0     CK W/ REFLX CKMB   Result Value Ref Range     39 - 308 U/L   TROPONIN I   Result Value Ref Range    Troponin-I, Qt. <0.04 <0.05 ng/mL   PROTHROMBIN TIME   Result Value Ref Range    INR 1.4 (H) 0.9 - 1.1      Prothrombin time 15.0 (H) 9.4 - 11.7 sec   EKG, 12 LEAD, INITIAL   Result Value Ref Range    Ventricular Rate 54 BPM    Atrial Rate 54 BPM    P-R Interval 410 ms    QRS Duration 98 ms    Q-T Interval 406 ms    QTC Calculation (Bezet) 385 ms    Calculated P Axis 51 degrees    Calculated R Axis -27 degrees    Calculated T Axis 25 degrees    Diagnosis       Sinus bradycardia with 1st degree AV block  Possible Left atrial enlargement  Borderline ECG  When compared with ECG of 16-MAY-2011 17:41,  MANUAL COMPARISON REQUIRED, DATA IS UNCONFIRMED  Confirmed by [id number], Gisele YUAN, Torey ([id number]) on 4/4/2013 6:49:59 AM   EKG, 12 LEAD, INITIAL   Result Value Ref Range    Ventricular Rate 54 BPM    Atrial Rate 54 BPM    P-R Interval 398 ms    QRS Duration 90 ms    Q-T Interval 412 ms    QTC Calculation (Bezet) 390 ms    Calculated P Axis 68 degrees    Calculated R Axis -31 degrees Calculated T Axis 49 degrees    Diagnosis       Sinus bradycardia with 1st degree AV block  Possible Left atrial enlargement  Left axis deviation  Abnormal ECG  When compared with ECG of 03-APR-2013 15:57,  MANUAL COMPARISON REQUIRED, DATA IS UNCONFIRMED  Confirmed by New Fraga M.D., Denice Guerrero ([id number]) on 4/4/2013 11:28:43 AM       No orders of the defined types were placed in this encounter. No diagnosis found. Follow-up Disposition:  Return in about 3 months (around 4/17/2018). EEG normal. Visual eye test via DR. Fidencio Rodriguez as seen as his note in progress notes. We discussed the testing. We discussed his POC and needing Dr. Kaylee Jean testing to diagnose dementia. Memory he feels is a little better with the  Lexapro 10 mg. Can increase more if needed. Anxiety is better also. Keep current POC and stay busy. With them they wish to follow with Dr. Fidencio Rodriguez as he was with them in the past and they want him. Call with changes.            This note will not be viewable in freeet

## 2018-01-17 NOTE — MR AVS SNAPSHOT
Ольга Tavera 
 
 
 [place] [address] [address] [phone] Patient: Boni Sandifer MRN: [id number] :1950 Visit Information Date & Time Provider Department Dept. Phone Encounter #  
 2018  1:00 PM Gilford Fess, NP [facility] [phone] [id number] Follow-up Instructions Return in about 3 months (around 2018). Your Appointments 2018  1:00 PM  
Any with Gilford Fess, NP  [facility] Appt Note: results star; results/will try to come in earlier due to weather lw  
 [address] [address] [phone]  
  
   
 [place] [id number] [address] 2018  1:40 PM  
New Patient with Trinity Erickson PsyD  [facility] Appt Note: new patient memory loss/ Rg Kevin [place] [address] [address] [phone]  
  
   
 [place] [address] [id number] [address] Upcoming Health Maintenance Date Due Hepatitis C Screening 1950 DTaP/Tdap/Td series (1 - Tdap) 10/6/1969 FOBT Q 1 YEAR AGE 50-75 10/6/1998 ZOSTER VACCINE AGE 60> 2008 GLAUCOMA SCREENING Q2Y 10/6/2013 Pneumococcal 65+ Low/Medium Risk (1 of 2 - PCV13) 10/6/2013 MEDICARE YEARLY EXAM 10/6/2013 Influenza Age 5 to Adult 2017 Allergies as of 2018  Review Complete On: 2018 By: Ratna Walter Severity Noted Reaction Type Reactions Gantrisin High 2010   Systemic Rash Current Immunizations  Reviewed on 2011 Name Date Influenza Vaccine Whole 10/1/2010 Not reviewed this visit Vitals BP Height(growth percentile) Weight(growth percentile) BMI Smoking Status 104/62 6' (1.829 m) 180 lb (81.6 kg) 24.41 kg/m2 Never Smoker BMI and BSA Data Body Mass Index Body Surface Area  
 24.41 kg/m 2 2.04 m 2 Preferred Pharmacy Pharmacy Name Phone [id number] [place] [address] [place] [address] [phone] Your Updated Medication List  
  
   
This list is accurate as of: 1/17/18 12:54 PM.  Always use your most recent med list.  
  
  
  
  
 aspirin delayed-release 81 mg tablet Take  by mouth daily. escitalopram oxalate 10 mg tablet Commonly known as:  Vola  Take 1 Tab by mouth daily. levothyroxine 125 mcg tablet Commonly known as:  SYNTHROID Take  by mouth Daily (before breakfast). Follow-up Instructions Return in about 3 months (around 4/17/2018). Patient Instructions PRESCRIPTION REFILL POLICY [facility] Statement to Patients April 1, 2014 In an effort to ensure the large volume of patient prescription refills is processed in the most efficient and expeditious manner, we are asking our patients to assist us by calling your Pharmacy for all prescription refills, this will include also your  Mail Order Pharmacy. The pharmacy will contact our office electronically to continue the refill process. Please do not wait until the last minute to call your pharmacy. We need at least 48 hours (2days) to fill prescriptions. We also encourage you to call your pharmacy before going to  your prescription to make sure it is ready. With regard to controlled substance prescription refill requests (narcotic refills) that need to be picked up at our office, we ask your cooperation by providing us with at least 72 hours (3days) notice that you will need a refill. We will not refill narcotic prescription refill requests after 4:00pm on any weekday, Monday through Thursday, or after 2:00pm on Fridays, or on the weekends. We encourage everyone to explore another way of getting your prescription refill request processed using Qwiki, our patient web portal through our electronic medical record system. Qwiki is an efficient and effective way to communicate your medication request directly to the office and  downloadable as an rikki on your smart phone . Qwiki also features a review functionality that allows you to view your medication list as well as leave messages for your physician. Are you ready to get connected? If so please review the attatched instructions or speak to any of our staff to get you set up right away! Thank you so much for your cooperation. Should you have any questions please contact our Practice Administrator. The Physicians and Staff,  [facility] Introducing [facility] SERVICES! [employer] introduces Qwiki patient portal. Now you can access parts of your medical record, email your doctor's office, and request medication refills online. 1. In your internet browser, go to [email or web address] 2. Click on the First Time User? Click Here link in the Sign In box. You will see the New Member Sign Up page. 3. Enter your Qwiki Access Code exactly as it appears below. You will not need to use this code after youve completed the sign-up process. If you do not sign up before the expiration date, you must request a new code. · Qwiki Access Code: [email or web address] Expires: 3/5/2018  2:26 PM 
 
4. Enter the last four digits of your Social Security Number (xxxx) and Date of Birth (mm/dd/yyyy) as indicated and click Submit. You will be taken to the next sign-up page. 5. Create a Qwiki ID. This will be your Qwiki login ID and cannot be changed, so think of one that is secure and easy to remember. 6. Create a Qwiki password. You can change your password at any time. 7. Enter your Password Reset Question and Answer.  This can be used at a later time if you forget your password. 8. Enter your e-mail address. You will receive e-mail notification when new information is available in [address] 9. Click Sign Up. You can now view and download portions of your medical record. 10. Click the Download Summary menu link to download a portable copy of your medical information. If you have questions, please visit the Frequently Asked Questions section of the [employer] website. Remember, [employer] is NOT to be used for urgent needs. For medical emergencies, dial 911. Now available from your iPhone and Android! Please provide this summary of care documentation to your next provider. Your primary care clinician is listed as Maudie Hamman. If you have any questions after today's visit, please call [phone].

## 2018-01-17 NOTE — PATIENT INSTRUCTIONS
[facility]   Statement to Patients  April 1, 2014      In an effort to ensure the large volume of patient prescription refills is processed in the most efficient and expeditious manner, we are asking our patients to assist us by calling your Pharmacy for all prescription refills, this will include also your  Mail Order Pharmacy. The pharmacy will contact our office electronically to continue the refill process. Please do not wait until the last minute to call your pharmacy. We need at least 48 hours (2days) to fill prescriptions. We also encourage you to call your pharmacy before going to  your prescription to make sure it is ready. With regard to controlled substance prescription refill requests (narcotic refills) that need to be picked up at our office, we ask your cooperation by providing us with at least 72 hours (3days) notice that you will need a refill. We will not refill narcotic prescription refill requests after 4:00pm on any weekday, Monday through Thursday, or after 2:00pm on Fridays, or on the weekends. We encourage everyone to explore another way of getting your prescription refill request processed using [employer], our patient web portal through our electronic medical record system. Att[employer] is an efficient and effective way to communicate your medication request directly to the office and  downloadable as an rikki on your smart phone . [employer] also features a review functionality that allows you to view your medication list as well as leave messages for your physician. Are you ready to get connected? If so please review the attatched instructions or speak to any of our staff to get you set up right away! Thank you so much for your cooperation. Should you have any questions please contact our Practice Administrator.     The Physicians and Staff,  [facility]

## 2018-03-27 ENCOUNTER — HOSPITAL ENCOUNTER (OUTPATIENT)
Dept: GENERAL RADIOLOGY | Age: 68
Discharge: HOME | End: 2018-03-27
Attending: FAMILY MEDICINE
Payer: MEDICARE

## 2018-03-27 DIAGNOSIS — R06.02 SHORTNESS OF BREATH: ICD-10-CM

## 2018-03-27 PROCEDURE — 71046 X-RAY EXAM CHEST 2 VIEWS: CPT

## 2018-05-09 ENCOUNTER — TELEPHONE (OUTPATIENT)
Dept: NEUROLOGY | Age: 68
End: 2018-05-09

## 2018-05-09 NOTE — TELEPHONE ENCOUNTER
I Called the patient and asked him to come in any time in the morning, as I have to leave by 2:30. He said he would check with his wife and then come in after another doctor's appointment tomorrow. He can come in anytime tomorrow morning.

## 2018-05-10 ENCOUNTER — OFFICE VISIT (OUTPATIENT)
Dept: NEUROLOGY | Age: 68
End: 2018-05-10

## 2018-05-10 DIAGNOSIS — Z86.59 HISTORY OF ANXIETY: ICD-10-CM

## 2018-05-10 DIAGNOSIS — G31.84 MILD COGNITIVE IMPAIRMENT: Primary | ICD-10-CM

## 2018-05-10 DIAGNOSIS — F43.22 ADJUSTMENT DISORDER WITH ANXIETY: ICD-10-CM

## 2018-05-10 NOTE — PROGRESS NOTES
[address] [facility],[address]  [address]   [place] [address] Located within [facility] [address]   [phone] Office   [phone] Fax      Neuropsychology    Initial Diagnostic Interview Note      Referral:  David Driver MD, Charles Blake NP    Sherwin Bone is a 69 y.o. right handed   male who was accompanied by his spouse to the initial clinical interview on 5/10/18. Please refer to his medical records for details pertaining to his history. Briefly, the patient reported that he completed the 12th grade and when he was in school he failed 4th and 7th grade and had to repeat those. He graduated with a standard diploma. He is retired, and worked in maintenance. He has had problems with progressive short term memory loss. He also has migraines, tremor, visual problems, and mood changes. He is worried about dementia. There is no known history of dementia. No background stroke, meningitis/encephalitis, JAMIE Fever, Lupus, Lyme, TBI, sz. Wife says he does not remember driving routes as well as he did previously. He can't concentrate. Can't multitask. Is more disorganized. Loses words sometimes. Has trouble remembering what he did earlier in the day or what he ate for breakfast. Things have gotten worse over the past few months. H He has a history of anxiety and depression and gets very anxious when he notices the memory problems. Pain related to headaches is significant. Gets worse - vision. Resting tremor. He takes his own medications and has no difficulties with his ADLs from a cognitive standpoint. Family reports that his half brother has dementia. Spouse notes that he loses things like his glasses. He has two dogs with sound shocks and will lay the collars down and can't find them initially. Misplaces things. Anxiety worse and not benefiting from medication. No legal issues currently.   Spouse corroborates. Spouse does all the cooking. No previous neuropsych. Neuropsychological Mental Status Exam (NMSE):  Historian: Good  Praxis: No UE apraxia  R/L Orientation: Intact to self and to other  Dress: within normal limits   Weight: within normal limits   Appearance/Hygiene: within normal limits   Gait: within normal limits   Assistive Devices: None  Mood: within normal limits   Affect: within normal limits   Comprehension: within normal limits   Thought Process: within normal limits   Expressive Language: within normal limits   Receptive Language: within normal limits   Motor:  No cognitive or motor perseveration  ETOH: RArely  Tobacco: Denied  Illicit: Denied  SI/HI: Denied  Psychosis: Denied  Insight: Within normal limits  Judgment: Within normal limits  Other Psych:      Past Medical History:   Diagnosis Date    Anxiety     Aortic insufficiency regurgitation 11/9/2010    Arthritis     neck    Atrial flutter (HCC)     cardioversion 5/18/11    BPH (benign prostatic hyperplasia)     Hypothyroid     Migraines     mobitz 1, atrioventricular block 11/9/2010       Past Surgical History:   Procedure Laterality Date    ECHO 2D ADULT  9/2010    normal LV wall motion and ejection fraction, left ventricular enlargement, left atrial enlargement, severe aortic regurgitation and mild mitral regurgitation. The ejection fraction was 55-60%.  ECHO 2D ADULT  2/2011    mild LVE, EF 58%, LAE, large Ao root, Severe AI, 2+MR    ECHO 2D ADULT  6/2011    LVH, EF 60%, trace -mild MR    HX CATARACT REMOVAL      RIGHT EYE    HX GI      inginal hernia repair 2008    HX HEART CATHETERIZATION  1987    normal coronary arteries normal left ventricular function.  HX HEART CATHETERIZATION  2011    Normal coronaries    HX HEART VALVE SURGERY  4/2011    AVR, [address] Epic porcine    HX HERNIA REPAIR      HX PACEMAKER  1987    VVI, after stopping \"voltarin\" his block resolved.  Subsequently battery depleted in 2002 and was not replaced       Allergies   Allergen Reactions    Gantrisin Rash       Family History   Problem Relation Age of Onset    Alcohol abuse Mother     Lung Disease Mother      EMPHYSEMA    Cancer Mother      LUNG CA    Stroke Mother     Heart Disease Father      MI       Social History   Substance Use Topics    Smoking status: Never Smoker    Smokeless tobacco: Never Used    Alcohol use Yes      Comment: OCCASIONAL ETOH       Current Outpatient Prescriptions   Medication Sig Dispense Refill    escitalopram oxalate (LEXAPRO) 10 mg tablet Take 1 Tab by mouth daily. 30 Tab 5    aspirin delayed-release 81 mg tablet Take  by mouth daily.  levothyroxine (SYNTHROID) 125 mcg tablet Take  by mouth Daily (before breakfast). Plan:  Obtain authorization for testing from insurance company. Report to follow once testing, scoring, and interpretation completed. ? Organic based neurocognitive issues versus mood disorder or combination of same. ? Problems organic, functional, or both? This note will not be viewable in [address]

## 2018-05-29 ENCOUNTER — OFFICE VISIT (OUTPATIENT)
Dept: NEUROLOGY | Age: 68
End: 2018-05-29

## 2018-05-29 DIAGNOSIS — H53.9 VISUAL CHANGES: ICD-10-CM

## 2018-05-29 DIAGNOSIS — F02.80 EARLY ONSET ALZHEIMER'S DEMENTIA WITHOUT BEHAVIORAL DISTURBANCE (HCC): Primary | ICD-10-CM

## 2018-05-29 DIAGNOSIS — G43.109 MIGRAINE AURA WITHOUT HEADACHE: ICD-10-CM

## 2018-05-29 DIAGNOSIS — F43.21 ADJUSTMENT DISORDER WITH DEPRESSED MOOD: ICD-10-CM

## 2018-05-29 DIAGNOSIS — G30.0 EARLY ONSET ALZHEIMER'S DEMENTIA WITHOUT BEHAVIORAL DISTURBANCE (HCC): Primary | ICD-10-CM

## 2018-05-29 DIAGNOSIS — F41.1 GENERALIZED ANXIETY DISORDER: ICD-10-CM

## 2018-05-30 NOTE — PROGRESS NOTES
[address] [facility],[address]Th [address]  [address]   [place] [address] [facility] [address] [place] [address] [address]   [phone] Office   [phone] Fax      Neuropsychological Evaluation Report\  Referral:  Marisela Bustos MD, [facility] YAIR, NP    Marina Kimble is a 69 y.o. right handed   male who was accompanied by his spouse to the initial clinical interview on 5/10/18. Please refer to his medical records for details pertaining to his history. Briefly, the patient reported that he completed the 12th grade and when he was in school he failed 4th and 7th grade and had to repeat those. He graduated with a standard diploma. He is retired, and worked in maintenance. He has had problems with progressive short term memory loss. He also has migraines, tremor, visual problems, and mood changes. He is worried about dementia. There is no known history of dementia. No background stroke, meningitis/encephalitis, JAMIE Fever, Lupus, Lyme, TBI, sz. Wife says he does not remember driving routes as well as he did previously. He can't concentrate. Can't multitask. Is more disorganized. Loses words sometimes. Has trouble remembering what he did earlier in the day or what he ate for breakfast. Things have gotten worse over the past few months. H He has a history of anxiety and depression and gets very anxious when he notices the memory problems. Pain related to headaches is significant. Gets worse - vision. Resting tremor. He takes his own medications and has no difficulties with his ADLs from a cognitive standpoint. Family reports that his half brother has dementia. Spouse notes that he loses things like his glasses. He has two dogs with sound shocks and will lay the collars down and can't find them initially. Misplaces things. Anxiety worse and not benefiting from medication. No legal issues currently. Spouse corroborates.   Spouse does all the cooking. No previous neuropsych. Neuropsychological Mental Status Exam (NMSE):  Historian: Good  Praxis: No UE apraxia  R/L Orientation: Intact to self and to other  Dress: within normal limits   Weight: within normal limits   Appearance/Hygiene: within normal limits   Gait: within normal limits   Assistive Devices: None  Mood: within normal limits   Affect: within normal limits   Comprehension: within normal limits   Thought Process: within normal limits   Expressive Language: within normal limits   Receptive Language: within normal limits   Motor:  No cognitive or motor perseveration  ETOH: RArely  Tobacco: Denied  Illicit: Denied  SI/HI: Denied  Psychosis: Denied  Insight: Within normal limits  Judgment: Within normal limits  Other Psych:      Past Medical History:   Diagnosis Date    Anxiety     Aortic insufficiency regurgitation 11/9/2010    Arthritis     neck    Atrial flutter (HCC)     cardioversion 5/18/11    BPH (benign prostatic hyperplasia)     Hypothyroid     Migraines     mobitz 1, atrioventricular block 11/9/2010       Past Surgical History:   Procedure Laterality Date    ECHO 2D ADULT  9/2010    normal LV wall motion and ejection fraction, left ventricular enlargement, left atrial enlargement, severe aortic regurgitation and mild mitral regurgitation. The ejection fraction was 55-60%.  ECHO 2D ADULT  2/2011    mild LVE, EF 58%, LAE, large Ao root, Severe AI, 2+MR    ECHO 2D ADULT  6/2011    LVH, EF 60%, trace -mild MR    HX CATARACT REMOVAL      RIGHT EYE    HX GI      inginal hernia repair 2008    HX HEART CATHETERIZATION  1987    normal coronary arteries normal left ventricular function.  HX HEART CATHETERIZATION  2011    Normal coronaries    HX HEART VALVE SURGERY  4/2011    AVR, #[address] Epic porcine    HX HERNIA REPAIR      HX PACEMAKER  1987    VVI, after stopping \"voltarin\" his block resolved.  Subsequently battery depleted in 2002 and was not replaced       Allergies   Allergen Reactions    Gantrisin Rash       Family History   Problem Relation Age of Onset    Alcohol abuse Mother     Lung Disease Mother      EMPHYSEMA    Cancer Mother      LUNG CA    Stroke Mother     Heart Disease Father      MI       Social History   Substance Use Topics    Smoking status: Never Smoker    Smokeless tobacco: Never Used    Alcohol use Yes      Comment: OCCASIONAL ETOH       Current Outpatient Prescriptions   Medication Sig Dispense Refill    escitalopram oxalate (LEXAPRO) 10 mg tablet Take 1 Tab by mouth daily. 30 Tab 5    aspirin delayed-release 81 mg tablet Take  by mouth daily.  levothyroxine (SYNTHROID) 125 mcg tablet Take  by mouth Daily (before breakfast). Plan:  Obtain authorization for testing from insurance company. Report to follow once testing, scoring, and interpretation completed. ? Organic based neurocognitive issues versus mood disorder or combination of same. ? Problems organic, functional, or both? This note will not be viewable in [address] Neuropsychological Test Results  Patient Testing 5/29/18 Report Completed 5/30/18  A Psychometrist Assisted w/ portions of this evaluation while under my direct  supervision    The following evaluation procedures/tests were administered:      Neuropsychologist Administered/Interpreted:  Neuropsychological Mental Status Exam, Revised Memory & Behavior Checklist,  Mini Mental Status Exam, Clock Drawing Test, Test Of Premorbid Functioning, Shaggy-Melzack Pain Questionnaire, History Taking  & Clinical Interview With The Patient, Additional History Taking w/ The Patient's Spouse, ABAS-3,  Review Of Available Records.     Psychometrist Administered under Neuropsychologist Supervision & Neuropsychologist Interpreted:  Verbal Fluency Tests, Lucas & Lucas - Revised, Trailmaking Test Parts A & B, Wechsler Adult Intelligence Scale - IV, Hossein Continuous Performance Test - III, [place] Verbal Learning Test - 3, Grooved Pegboard, Pacheco Depression Inventory - II, Pacheco Anxiety Inventory, Personality Assessment Inventory. Test Findings:  Test Findings:  Note:  The patients raw data have been compared with currently available norms which include demographic corrections for age, gender, and/or education. Sometimes, the patients scores are compared to demographically similar individuals as close to the patients age, education level, etc., as possible. \"Average\" is viewed as being +/- 1 standard deviation (SD) from the stated mean for a particular test score. \"Low average\" is viewed as being between 1 and 2 SD below the mean, and above average is viewed as being 1 and 2 SD above the mean. Scores falling in the borderline range (between 1-1/2 and 2 SD below the mean) are viewed with particular attention as to whether they are normal or abnormal neurocognitive test scores. Other methods of inference in analyzing the test data are also utilized, including the pattern and range of scores in the profile, bilateral motor functions, and the presence, if any, of pathognomonic signs. Behaviorally, the patient was friendly and cooperative and appeared motivated to perform well during this examination. He had an eye infection and was impacting his left eye. On a new medication for anxiety. 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 Within this context, the results of this evaluation are viewed as a valid reflection of the patients actual neurocognitive and emotional status. His MMSE score of 26/30 correct was impaired. In this regard, he was not oriented to season. Recall for three words after a brief delay was 1/3 correct. Writing was impaired. Clock drawing was normal.        His structured word list fluency, as assessed by the FAS Test, was within the mildly to moderately impaired range with a T score of 30. Category fluency was within the average range with a T score of 50.   Confrontation naming ability, as assessed by the [facility] - Revised, was within the average range at 51/60 correct (T = 46). This pattern ofperformance is indicative of a patient who is at increased risk for day-to-day problems with verbal fluency and confrontation naming was normal.       The patient was administered the Hossein Continuous Performance Test - III,a computer administered test of sustained attention, and review of the subscales within this instrument revealed mild concerns for inattentiveness without impulsivity. This pattern of performance is indicative of a patient who is at increased risk for day-to-day problems with sustained visual attention/concentration. The patient is not showing problems with working memory capacity (13th %ile) or processing speed (10th %ile) on the WAIS-IV. His Verbal Comprehension Index score of 93 was average. His Perceptual Reasoning Index score of 90 was average. These scores do not reflect a decline in functioning based on an assessment of premorbid functioning. The patient was administered the [place] Verbal Learning Test  - 3 and generated an impaired range (and flat) learning curve over five repeated auditory word list learning trials. An interference trial was within the normal range. Free and cued, short and long delayed recall were all impaired. Recognition recall was impaired. Forced choice recall was normal.   This pattern of performance is indicative of a patient who is at increased risk for day-to-day problems with auditory learning and memory. Simple timed visual motor sequencing (Trailmaking Test Part A) was within the average range with a T score of 45. His performance on a similar, but more complex task of timed visual motor sequencing (Trailmaking Test Part B) was within the severely impaired range with a T score of 18.   He made only one sequencing error on this latter test, but ran out of time to complete it per test protocol. As such, this does not indicate marked problems with executive functioning. Fine motor dexterity was within the normal range bilaterally. This pattern of performance is not  indicative of a patient who is at increased risk for day-to-day problems with bilateral motor dexterity. The patient rated his current level of pain as \"0/5- No Pain\" on the Shaggy-Melzack Pain Questionnaire. His Pacheco Depression Inventory- II score of 6 was within the normal range. His Pacheco Anxiety Inventory score of 2 reflected minimal anxiety. The patient was administered the Personality Assessment Inventory and generated a valid profile for interpretation. Within this context, his self-concept is fixed and negative. He is inwardly more troubled by self-doubt and misgivings about his adequacy than is readily apparent to others. Anxiety is also present. He can be zeyad and unassertive at times. The personality profile is otherwise normal.        The spouse completed the ABAS-3 and did not report clinically significant concerns regarding his general adaptive skills, conceptual skills, social skills, or practical skills. Impressions & Recommendations: This patient generated a mixed normal/abnormal  range Neuropsychological Evaluation with respect to neurocognitive functioning. In this regard, he is showing marked impairment with auditory learning and memory and mild impairments with visual attention and verbal fluency. Otherwise, his confrontation naming, working memory, processing speed, perceptual reasoning, verbal comprehension, executive functioning, and bilateral fine motor dexterity were normal.  Vision issues and medication changes are not likely the basis for his generated combination of neurocognitive strengths and weaknesses. From an emotional standpoint, there is support for both anxiety and depression.   The anxiety appears to be a combination of organic and functional factors and the depression appears mostly functional.  He has recently been started on a different medication for anxiety. In my opinion, this profile is consistent with an evolving organic process that is currently at a mild level of severity. Mood issues exacerbate, but currently the profile is not purely consistent with pseudodementia. Early stage AD is likely. In addition to continued medical care, my recommendations include consideration for memory management medication. Consider also treatment for attention if not medically contraindicated (the problem is mild). I also recommend continued treatment for anxiety and counseling to assist with adjustment related depression and self-esteem/self-concept issues. .  The patient should be encouraged to remain as mentally, socially, and physically active as possible. The patient's generated cognitive difficulties are significant to the degree whereby it is my opinion that he should not live independently without appropriate supervision for those domains with a heavy memory emphasis. This includes medication management supervision and supervision of financial dealings. I am not overly concerned about driving but using a GPS may be wise when he is driving alone. The problem has been caught early on, and I hope he recognizes this as positive. I am not currently concerned about competency. Baseline now established. Follow up one year or  prn. Clinical correlation is, of course, indicated. I will discuss these findings with the patient and family when they follow up with me in the near future. A follow up Neuropsychological Evaluation is indicated on a prn basis. DIAGNOSES: Dementia - Mild    Anxiety - Mild to moderate    Depression - Mild     The above information is based upon information currently available to me.   If there is any additional information of which I am currently unaware, I would be more than happy to review it upon having it made available to me.  Thank you for the opportunity to see this interesting individual.     Sincerely,       Sherine Lyle. Yary Garcia, Andreas, EdS      Attachments:  IQ Test Results (In Media Section Of This EMR)    Cc: Clifton Wyman MD, Sarah Wolff NP    2 units -48705- 1.75 hours Record review. Review of history provided by patient. Review of collaborative information. Testing by Clinician. Review of raw data. Scoring. Report writing of individual tests administered by Clinician. Integration of individual tests administered by psychometrist (that were previously reported and billed under psychometry code below) with testing by clinician and review of records/history/collaborative information. Case Conceptualization, Report writing. Coordination Of Care. 4 units  -67957 - 3.75 hours Psychometrist test prep, administration, and scoring under clinician's direct supervision. Clinical interpretation of individual tests administered by psychometrist .  Clinician report of individual tests administered by psychometrist.    \"Unit\" is defined by CPT/National Guidelines (31 - 60 minutes). Integral services including scoring of raw data, data interpretation, case conceptualization, report writing etcetera were initiated after the patient finished testing/raw data collected and was completed on the date the report was signed.

## 2018-06-19 ENCOUNTER — OFFICE VISIT (OUTPATIENT)
Dept: NEUROLOGY | Age: 68
End: 2018-06-19

## 2018-06-19 VITALS
HEIGHT: 72 IN | WEIGHT: 175 LBS | DIASTOLIC BLOOD PRESSURE: 68 MMHG | SYSTOLIC BLOOD PRESSURE: 102 MMHG | BODY MASS INDEX: 23.7 KG/M2

## 2018-06-19 DIAGNOSIS — G30.0 EARLY ONSET ALZHEIMER'S DEMENTIA WITHOUT BEHAVIORAL DISTURBANCE (HCC): Primary | ICD-10-CM

## 2018-06-19 DIAGNOSIS — F02.80 EARLY ONSET ALZHEIMER'S DEMENTIA WITHOUT BEHAVIORAL DISTURBANCE (HCC): Primary | ICD-10-CM

## 2018-06-19 RX ORDER — DONEPEZIL HYDROCHLORIDE 5 MG/1
TABLET, FILM COATED ORAL
COMMUNITY
End: 2020-02-19 | Stop reason: DRUGHIGH

## 2018-06-19 NOTE — PATIENT INSTRUCTIONS
10 [facility]   Statement to Patients  April 1, 2014      In an effort to ensure the large volume of patient prescription refills is processed in the most efficient and expeditious manner, we are asking our patients to assist us by calling your Pharmacy for all prescription refills, this will include also your  Mail Order Pharmacy. The pharmacy will contact our office electronically to continue the refill process. Please do not wait until the last minute to call your pharmacy. We need at least 48 hours (2days) to fill prescriptions. We also encourage you to call your pharmacy before going to  your prescription to make sure it is ready. With regard to controlled substance prescription refill requests (narcotic refills) that need to be picked up at our office, we ask your cooperation by providing us with at least 72 hours (3days) notice that you will need a refill. We will not refill narcotic prescription refill requests after 4:00pm on any weekday, Monday through Thursday, or after 2:00pm on Fridays, or on the weekends. We encourage everyone to explore another way of getting your prescription refill request processed using [employer], our patient web portal through our electronic medical record system. [employer] is an efficient and effective way to communicate your medication request directly to the office and  downloadable as an rikki on your smart phone . [employer] also features a review functionality that allows you to view your medication list as well as leave messages for your physician. Are you ready to get connected? If so please review the attatched instructions or speak to any of our staff to get you set up right away! Thank you so much for your cooperation. Should you have any questions please contact our Practice Administrator.     The Physicians and Staff,  [address] [place] [address] Neurology Clinic

## 2018-06-19 NOTE — MR AVS SNAPSHOT
[address] [facility]  [address] [address] [phone] Patient: Ирина King MRN: [id number] :1950 Visit Information Date & Time Provider Department Dept. Phone Encounter #  
 2018  9:00 AM HOWIE Casarez Neurology [facility] [phone] [id number] Follow-up Instructions Return in about 2 months (around 2018). Your Appointments 2018  2:00 PM  
Follow Up with Rodríguez Marc PsyD [address] [facility] Appt Note: office feedback/ [facility]  [address] [id number] [phone]  
  
   
 [facility] [place]  [address] [address]  [address] Upcoming Health Maintenance Date Due Hepatitis C Screening 1950 DTaP/Tdap/Td series (1 - Tdap) 10/6/1969 FOBT Q 1 YEAR AGE 50-75 10/6/1998 ZOSTER VACCINE AGE 60> 2008 Pneumococcal 65+ Low/Medium Risk (1 of 2 - PCV13) 10/6/2013 MEDICARE YEARLY EXAM 3/14/2018 Influenza Age 5 to Adult 2018 GLAUCOMA SCREENING Q2Y 2019 Allergies as of 2018  Review Complete On: 2018 By: Ron Richmond Severity Noted Reaction Type Reactions Gantrisin High 2010   Systemic Rash Current Immunizations  Reviewed on 2011 Name Date Influenza Vaccine Whole 10/1/2010 Not reviewed this visit You Were Diagnosed With   
  
 Codes Comments Early onset Alzheimer's dementia without behavioral disturbance    -  Primary ICD-10-CM: G30.0, F02.80 ICD-9-CM: 331.0, 294.10 Vitals BP Height(growth percentile) Weight(growth percentile) BMI Smoking Status 102/68 6' (1.829 m) 175 lb (79.4 kg) 23.73 kg/m2 Never Smoker BMI and BSA Data Body Mass Index Body Surface Area  
 23.73 kg/m 2 2.01 m 2 Your Updated Medication List  
  
   
 This list is accurate as of 6/19/18  9:27 AM.  Always use your most recent med list.  
  
  
  
  
 aspirin delayed-release 81 mg tablet Take  by mouth daily. donepezil 5 mg tablet Commonly known as:  ARICEPT Take  by mouth nightly. levothyroxine 125 mcg tablet Commonly known as:  SYNTHROID Take  by mouth Daily (before breakfast). Follow-up Instructions Return in about 2 months (around 8/19/2018). Patient Instructions PRESCRIPTION REFILL POLICY [facility] Statement to Patients April 1, 2014 In an effort to ensure the large volume of patient prescription refills is processed in the most efficient and expeditious manner, we are asking our patients to assist us by calling your Pharmacy for all prescription refills, this will include also your  Mail Order Pharmacy. The pharmacy will contact our office electronically to continue the refill process. Please do not wait until the last minute to call your pharmacy. We need at least 48 hours (2days) to fill prescriptions. We also encourage you to call your pharmacy before going to  your prescription to make sure it is ready. With regard to controlled substance prescription refill requests (narcotic refills) that need to be picked up at our office, we ask your cooperation by providing us with at least 72 hours (3days) notice that you will need a refill. We will not refill narcotic prescription refill requests after 4:00pm on any weekday, Monday through Thursday, or after 2:00pm on Fridays, or on the weekends. We encourage everyone to explore another way of getting your prescription refill request processed using [employer], our patient web portal through our electronic medical record system. [employer] is an efficient and effective way to communicate your medication request directly to the office and  downloadable as an rikki on your smart phone .  [employer] also features a review functionality that allows you to view your medication list as well as leave messages for your physician. Are you ready to get connected? If so please review the attatched instructions or speak to any of our staff to get you set up right away! Thank you so much for your cooperation. Should you have any questions please contact our Practice Administrator. The Physicians and Staff,  [facility] Introducing [facility] SERVICES! [employer] introduces [employer] patient portal. Now you can access parts of your medical record, email your doctor's office, and request medication refills online. 1. In your internet browser, go to [email or web address] 2. Click on the First Time User? Click Here link in the Sign In box. You will see the New Member Sign Up page. 3. Enter your [employer] Access Code exactly as it appears below. You will not need to use this code after youve completed the sign-up process. If you do not sign up before the expiration date, you must request a new code. · [employer] Access Code: [email or web address] Expires: 8/28/2018 12:19 PM 
 
4. Enter the last four digits of your Social Security Number (xxxx) and Date of Birth (mm/dd/yyyy) as indicated and click Submit. You will be taken to the next sign-up page. 5. Create a [employer] ID. This will be your [employer] login ID and cannot be changed, so think of one that is secure and easy to remember. 6. Create a [employer] password. You can change your password at any time. 7. Enter your Password Reset Question and Answer. This can be used at a later time if you forget your password. 8. Enter your e-mail address. You will receive e-mail notification when new information is available in [address] 9. Click Sign Up. You can now view and download portions of your medical record. 10. Click the Download Summary menu link to download a portable copy of your medical information. If you have questions, please visit the Frequently Asked Questions section of the [employer] website. Remember, [employer] is NOT to be used for urgent needs. For medical emergencies, dial 911. Now available from your iPhone and Android! Please provide this summary of care documentation to your next provider. Your primary care clinician is listed as Gin Damon. If you have any questions after today's visit, please call [phone].

## 2018-06-19 NOTE — PROGRESS NOTES
Bree Franklin is a 69 y.o. male who presents with the following  Chief Complaint   Patient presents with    Follow-up       HPI  Patient comes in with wife for a follow up for neuropsych testing. States he feels like he did well overall on the testing but knows he had missed some questions. He states his biggest issue right now is short term memory loss and inability to remember where he put things such as his wallet, keys, day to day items. He can normally remember where he put them eventually but does misplace things frequently. He is driving without difficulty. He is eating and sleeping well. He is not hallucinating, wandering or having any current concerns. His anxiety has always been something he worries about and notices recently is getting worse and he worries about a lot of things but is working with PCP to help manage. She also started him on Aricept 5 mg and increase to 10 mg in a month. Allergies   Allergen Reactions    Gantrisin Rash       Current Outpatient Prescriptions   Medication Sig    donepezil (ARICEPT) 5 mg tablet Take  by mouth nightly.  aspirin delayed-release 81 mg tablet Take  by mouth daily.  levothyroxine (SYNTHROID) 125 mcg tablet Take  by mouth Daily (before breakfast). No current facility-administered medications for this visit. History   Smoking Status    Never Smoker   Smokeless Tobacco    Never Used       Past Medical History:   Diagnosis Date    Anxiety     Aortic insufficiency regurgitation 11/9/2010    Arthritis     neck    Atrial flutter (HCC)     cardioversion 5/18/11    BPH (benign prostatic hyperplasia)     Hypothyroid     Migraines     mobitz 1, atrioventricular block 11/9/2010       Past Surgical History:   Procedure Laterality Date    ECHO 2D ADULT  9/2010    normal LV wall motion and ejection fraction, left ventricular enlargement, left atrial enlargement, severe aortic regurgitation and mild mitral regurgitation.  The ejection fraction was 55-60%.  ECHO 2D ADULT  2/2011    mild LVE, EF 58%, LAE, large Ao root, Severe AI, 2+MR    ECHO 2D ADULT  6/2011    LVH, EF 60%, trace -mild MR    HX CATARACT REMOVAL      RIGHT EYE    HX GI      inginal hernia repair 2008    HX HEART CATHETERIZATION  1987    normal coronary arteries normal left ventricular function.  HX HEART CATHETERIZATION  2011    Normal coronaries    HX HEART VALVE SURGERY  4/2011    AVR, #[address] Epic porcine    HX HERNIA REPAIR      HX PACEMAKER  1987    VVI, after stopping \"voltarin\" his block resolved. Subsequently battery depleted in 2002 and was not replaced       Family History   Problem Relation Age of Onset    Alcohol abuse Mother     Lung Disease Mother      EMPHYSEMA    Cancer Mother      LUNG CA    Stroke Mother     Heart Disease Father      MI       Social History     Social History    Marital status:      Spouse name: N/A    Number of children: N/A    Years of education: N/A     Social History Main Topics    Smoking status: Never Smoker    Smokeless tobacco: Never Used    Alcohol use Yes      Comment: OCCASIONAL ETOH    Drug use: Yes     Special: Prescription    Sexual activity: Not Asked     Other Topics Concern    None     Social History Narrative       Review of Systems   Neurological: Negative for loss of consciousness. Psychiatric/Behavioral: Positive for memory loss. Negative for hallucinations. The patient is nervous/anxious. The patient does not have insomnia. Remainder of comprehensive review is negative. Physical Exam :    Visit Vitals    /68    Ht 6' (1.829 m)    Wt 79.4 kg (175 lb)    BMI 23.73 kg/m2       Impressions & Recommendations: This patient generated a mixed normal/abnormal  range Neuropsychological Evaluation with respect to neurocognitive functioning.   In this regard, he is showing marked impairment with auditory learning and memory and mild impairments with visual attention and verbal fluency. Otherwise, his confrontation naming, working memory, processing speed, perceptual reasoning, verbal comprehension, executive functioning, and bilateral fine motor dexterity were normal.  Vision issues and medication changes are not likely the basis for his generated combination of neurocognitive strengths and weaknesses. From an emotional standpoint, there is support for both anxiety and depression. The anxiety appears to be a combination of organic and functional factors and the depression appears mostly functional.  He has recently been started on a different medication for anxiety.                             In my opinion, this profile is consistent with an evolving organic process that is currently at a mild level of severity. Mood issues exacerbate, but currently the profile is not purely consistent with pseudodementia. Early stage AD is likely. In addition to continued medical care, my recommendations include consideration for memory management medication. Consider also treatment for attention if not medically contraindicated (the problem is mild). I also recommend continued treatment for anxiety and counseling to assist with adjustment related depression and self-esteem/self-concept issues. .  The patient should be encouraged to remain as mentally, socially, and physically active as possible.                             The patient's generated cognitive difficulties are significant to the degree whereby it is my opinion that he should not live independently without appropriate supervision for those domains with a heavy memory emphasis. This includes medication management supervision and supervision of financial dealings. I am not overly concerned about driving but using a GPS may be wise when he is driving alone. The problem has been caught early on, and I hope he recognizes this as positive. I am not currently concerned about competency. Baseline now established.   Follow up one year or  prn. Clinical correlation is, of course, indicated.                           I will discuss these findings with the patient and family when they follow up with me in the near future. A follow up Neuropsychological Evaluation is indicated on a prn basis.       DIAGNOSES:           Dementia - Mild                                              Anxiety - Mild to moderate                                              Depression - Mild        Results for orders placed or performed during the hospital encounter of 04/03/13   CBC WITH AUTOMATED DIFF   Result Value Ref Range    WBC 6.4 4.1 - 11.1 K/uL    RBC 5.43 4.10 - 5.70 M/uL    HGB 16.1 12.1 - 17.0 g/dL    HCT 46.1 36.6 - 50.3 %    MCV 84.9 80.0 - 99.0 FL    MCH 29.7 26.0 - 34.0 PG    MCHC 34.9 30.0 - 36.5 g/dL    RDW 12.9 11.5 - 14.5 %    PLATELET 947 366 - 002 K/uL    NEUTROPHILS 67 32 - 75 %    LYMPHOCYTES 21 12 - 49 %    MONOCYTES 8 5 - 13 %    EOSINOPHILS 3 0 - 7 %    BASOPHILS 1 0 - 1 %    ABS. NEUTROPHILS 4.3 1.8 - 8.0 K/UL    ABS. LYMPHOCYTES 1.3 0.8 - 3.5 K/UL    ABS. MONOCYTES 0.5 0.0 - 1.0 K/UL    ABS. EOSINOPHILS 0.2 0.0 - 0.4 K/UL    ABS. BASOPHILS 0.0 0.0 - 0.1 K/UL   CK W/ CKMB & INDEX   Result Value Ref Range    CK - MB 2.9 0.5 - 3.6 NG/ML    CK-MB Index 1.2 0 - 2.5       39 - 637 U/L   METABOLIC PANEL, COMPREHENSIVE   Result Value Ref Range    Sodium 141 136 - 145 MMOL/L    Potassium 4.3 3.5 - 5.1 MMOL/L    Chloride 104 97 - 108 MMOL/L    CO2 29 21 - 32 MMOL/L    Anion gap 8 5 - 15 mmol/L    Glucose 96 65 - 100 MG/DL    BUN 18 6 - 20 MG/DL    Creatinine 1.05 0.45 - 1.15 MG/DL    BUN/Creatinine ratio 17 12 - 20      GFR est AA >60 >60 ml/min/1.73m2    GFR est non-AA >60 >60 ml/min/1.73m2    Calcium 8.8 8.5 - 10.1 MG/DL    Bilirubin, total 0.7 0.2 - 1.0 MG/DL    ALT (SGPT) 27 12 - 78 U/L    AST (SGOT) 24 15 - 37 U/L    Alk.  phosphatase 65 50 - 136 U/L    Protein, total 6.7 6.4 - 8.2 g/dL    Albumin 3.7 3.5 - 5.0 g/dL    Globulin 3.0 2.0 - 4.0 g/dL    A-G Ratio 1.2 1.1 - 2.2     TROPONIN I   Result Value Ref Range    Troponin-I, Qt. <0.04 <0.37 ng/mL   METABOLIC PANEL, COMPREHENSIVE   Result Value Ref Range    Sodium 140 136 - 145 MMOL/L    Potassium 4.3 3.5 - 5.1 MMOL/L    Chloride 104 97 - 108 MMOL/L    CO2 30 21 - 32 MMOL/L    Anion gap 6 5 - 15 mmol/L    Glucose 97 65 - 100 MG/DL    BUN 18 6 - 20 MG/DL    Creatinine 1.05 0.45 - 1.15 MG/DL    BUN/Creatinine ratio 17 12 - 20      GFR est AA >60 >60 ml/min/1.73m2    GFR est non-AA >60 >60 ml/min/1.73m2    Calcium 8.9 8.5 - 10.1 MG/DL    Bilirubin, total 0.6 0.2 - 1.0 MG/DL    ALT (SGPT) 26 12 - 78 U/L    AST (SGOT) 25 15 - 37 U/L    Alk.  phosphatase 66 50 - 136 U/L    Protein, total 6.6 6.4 - 8.2 g/dL    Albumin 3.7 3.5 - 5.0 g/dL    Globulin 2.9 2.0 - 4.0 g/dL    A-G Ratio 1.3 1.1 - 2.2     LIPID PANEL   Result Value Ref Range    LIPID PROFILE         Cholesterol, total 158 <200 MG/DL    Triglyceride 179 (H) <150 MG/DL    HDL Cholesterol 32 MG/DL    LDL, calculated 90.2 0 - 100 MG/DL    VLDL, calculated 35.8 MG/DL    CHOL/HDL Ratio 4.9 0 - 5.0     CK W/ REFLX CKMB   Result Value Ref Range     39 - 308 U/L   TROPONIN I   Result Value Ref Range    Troponin-I, Qt. <0.04 <0.05 ng/mL   PROTHROMBIN TIME   Result Value Ref Range    INR 1.4 (H) 0.9 - 1.1      Prothrombin time 15.0 (H) 9.4 - 11.7 sec   EKG, 12 LEAD, INITIAL   Result Value Ref Range    Ventricular Rate 54 BPM    Atrial Rate 54 BPM    P-R Interval 410 ms    QRS Duration 98 ms    Q-T Interval 406 ms    QTC Calculation (Bezet) 385 ms    Calculated P Axis 51 degrees    Calculated R Axis -27 degrees    Calculated T Axis 25 degrees    Diagnosis       Sinus bradycardia with 1st degree AV block  Possible Left atrial enlargement  Borderline ECG  When compared with ECG of 16-MAY-2011 17:41,  MANUAL COMPARISON REQUIRED, DATA IS UNCONFIRMED  Confirmed by [id number], Gisele YUAN, Torey ([id number]) on 4/4/2013 6:49:59 AM   EKG, 12 LEAD, INITIAL   Result Value Ref Range    Ventricular Rate 54 BPM    Atrial Rate 54 BPM    P-R Interval 398 ms    QRS Duration 90 ms    Q-T Interval 412 ms    QTC Calculation (Bezet) 390 ms    Calculated P Axis 68 degrees    Calculated R Axis -31 degrees    Calculated T Axis 49 degrees    Diagnosis       Sinus bradycardia with 1st degree AV block  Possible Left atrial enlargement  Left axis deviation  Abnormal ECG  When compared with ECG of 03-APR-2013 15:57,  MANUAL COMPARISON REQUIRED, DATA IS UNCONFIRMED  Confirmed by Ofelia Wall M.D., Rhys Valles ([id number]) on 4/4/2013 11:28:43 AM       Orders Placed This Encounter    donepezil (ARICEPT) 5 mg tablet     Sig: Take  by mouth nightly. 1. Early onset Alzheimer's dementia without behavioral disturbance        Follow-up Disposition:  Return in about 2 months (around 8/19/2018). We discussed the test results above in full. They have no questions. We discussed AD, treatments, progression, disease process pathophysiology. He will keep on Aricept and up to 10 mg and then can add Namenda in form of Namzaric up to 28-10 for further memory preservation. He understands the need to stay active mentally and physically in order to keep things as best as we can. Given materials on AD and memory loss for their review. They want to see if PCP can take care of memory medications and avoid coming here if possible. They will check with her and see. This note will not be viewable in [facility]      Total time: 45 min   Counseling / coordination time: 45min   > 50% counseling / coordination?: Yes re: test results, memory loss, pathophysiology, medications.

## 2018-07-31 ENCOUNTER — OFFICE VISIT (OUTPATIENT)
Dept: NEUROLOGY | Age: 68
End: 2018-07-31

## 2018-07-31 DIAGNOSIS — F02.80 EARLY ONSET ALZHEIMER'S DEMENTIA WITHOUT BEHAVIORAL DISTURBANCE (HCC): Primary | ICD-10-CM

## 2018-07-31 DIAGNOSIS — F41.1 GENERALIZED ANXIETY DISORDER: ICD-10-CM

## 2018-07-31 DIAGNOSIS — G30.0 EARLY ONSET ALZHEIMER'S DEMENTIA WITHOUT BEHAVIORAL DISTURBANCE (HCC): Primary | ICD-10-CM

## 2018-07-31 NOTE — PROGRESS NOTES
Office feedback session with the patient and spouse today. I reviewed the results of the recent Neuropsychological Evaluation, including discussing individual tests as well as patient's areas of neurocognitive strength versus weakness. Education was provided regarding my diagnostic impressions, and we discussed treatment plan/options. I also answered numerous questions related to the clinical findings, including discussing various methods to improve cognition and mood. We talked about early dementia as well as anxiety issues and medication concerns need to be discussed with neuro. Consider counseling also. Competency, day-to-day supervision needs, etcl all discussed with them. He is sweating a lot and it may be a Paxil issue? He needs to discuss medication side effects with it. Counseling provided regarding mood and cognition. CBT and supportive psychotherapy techniques were utilized. The patient will follow up with the referring provider, and reported being very pleased with the services provided. [facility] 20 minutes with patient and spouse, record review, coordination of care. Records provided. We discussed: This patient generated a mixed normal/abnormal  range Neuropsychological Evaluation with respect to neurocognitive functioning. In this regard, he is showing marked impairment with auditory learning and memory and mild impairments with visual attention and verbal fluency. Otherwise, his confrontation naming, working memory, processing speed, perceptual reasoning, verbal comprehension, executive functioning, and bilateral fine motor dexterity were normal.  Vision issues and medication changes are not likely the basis for his generated combination of neurocognitive strengths and weaknesses. From an emotional standpoint, there is support for both anxiety and depression.   The anxiety appears to be a combination of organic and functional factors and the depression appears mostly functional.  He has recently been started on a different medication for anxiety.                             In my opinion, this profile is consistent with an evolving organic process that is currently at a mild level of severity. Mood issues exacerbate, but currently the profile is not purely consistent with pseudodementia. Early stage AD is likely. In addition to continued medical care, my recommendations include consideration for memory management medication. Consider also treatment for attention if not medically contraindicated (the problem is mild). I also recommend continued treatment for anxiety and counseling to assist with adjustment related depression and self-esteem/self-concept issues. .  The patient should be encouraged to remain as mentally, socially, and physically active as possible.                             The patient's generated cognitive difficulties are significant to the degree whereby it is my opinion that he should not live independently without appropriate supervision for those domains with a heavy memory emphasis. This includes medication management supervision and supervision of financial dealings. I am not overly concerned about driving but using a GPS may be wise when he is driving alone. The problem has been caught early on, and I hope he recognizes this as positive. I am not currently concerned about competency. Baseline now established. Follow up one year or  prn. Clinical correlation is, of course, indicated.                           I will discuss these findings with the patient and family when they follow up with me in the near future.   A follow up Neuropsychological Evaluation is indicated on a prn basis.       DIAGNOSES:           Dementia - Mild                                              Anxiety - Mild to moderate                                              Depression - Mild

## 2018-11-28 ENCOUNTER — TELEPHONE (OUTPATIENT)
Dept: NEUROLOGY | Age: 68
End: 2018-11-28

## 2018-11-28 NOTE — TELEPHONE ENCOUNTER
Patient was tested end of May and follow up on July 31 of this year. Returned call no answer left message to call me to clarify message received.

## 2018-11-28 NOTE — TELEPHONE ENCOUNTER
----- Message from Tevin Roper sent at 11/28/2018  9:46 AM EST -----  Regarding: Dr. Zeyad Manuel, wife, requested a call back to schedule a f/u appt and stated she has attempted to do so several times since June and was unable to speak with anyone at the clinic. Best contact [phone].

## 2019-02-01 ENCOUNTER — OFFICE VISIT (OUTPATIENT)
Dept: NEUROLOGY | Age: 69
End: 2019-02-01

## 2019-02-01 DIAGNOSIS — G30.0 EARLY ONSET ALZHEIMER'S DEMENTIA WITHOUT BEHAVIORAL DISTURBANCE (HCC): Primary | ICD-10-CM

## 2019-02-01 DIAGNOSIS — F02.80 EARLY ONSET ALZHEIMER'S DEMENTIA WITHOUT BEHAVIORAL DISTURBANCE (HCC): Primary | ICD-10-CM

## 2019-02-01 DIAGNOSIS — F41.1 GENERALIZED ANXIETY DISORDER: ICD-10-CM

## 2019-02-01 NOTE — LETTER
2/1/2019 11:01 AM 
 
Patient:  Chris Kumar YOB: 1950 Date of Visit: 2/1/2019 Dear Barbara Cary MD 
[address] [facility] [place] [id number] VIA Facsimile: [phone] 
 : Thank you for referring Mr. Stephanie Ulloa to me for evaluation/treatment. I saw him today for a six month follow up. Memory seems stable per patient and family thus we will extend out testing for six months (then the difference in time between first and second eval would be one year) He does, however, continue to have marked problems with anxiety, and I suggested to them they contact you with respect to a review of his current medication management for anxiety. I am also recommending consultation with sleep specialist due to snoring and gasping/not breathing events at night. If you have questions, please do not hesitate to call me. I look forward to following Mr. Shantell Thornton along with you. Sincerely, Audra Nguyễn PsyD

## 2019-02-01 NOTE — PROGRESS NOTES
[address] [facility],5Th [address]  [address]   [place] [address] [facility], [facility] [address]   [phone] Office   [phone] Fax      Neuropsychology    Exam #2    Initial Diagnostic Interview Note      Referral:  Veronica Lucero MD    Bree Franklin is a 77 y.o. right handed   male who was accompanied by his spouse to the initial clinical interview on 2/1/19 . Please refer to his medical records for details pertaining to his history. When I saw him last:    Bree Franklin is a 69 y.o. right handed   male who was accompanied by his spouse to the initial clinical interview on 5/10/18. Please refer to his medical records for details pertaining to his history. Briefly, the patient reported that he completed the 12th grade and when he was in school he failed 4th and 7th grade and had to repeat those. He graduated with a standard diploma. He is retired, and worked in maintenance. He has had problems with progressive short term memory loss. He also has migraines, tremor, visual problems, and mood changes. He is worried about dementia. There is no known history of dementia. No background stroke, meningitis/encephalitis, JAMIE Fever, Lupus, Lyme, TBI, sz. Wife says he does not remember driving routes as well as he did previously. He can't concentrate. Can't multitask. Is more disorganized. Loses words sometimes. Has trouble remembering what he did earlier in the day or what he ate for breakfast. Things have gotten worse over the past few months. H He has a history of anxiety and depression and gets very anxious when he notices the memory problems. Pain related to headaches is significant. Gets worse - vision. Resting tremor. He takes his own medications and has no difficulties with his ADLs from a cognitive standpoint. Family reports that his half brother has dementia.   Spouse notes that he loses things like his glasses. He has two dogs with sound shocks and will lay the collars down and can't find them initially. Misplaces things. Anxiety worse and not benefiting from medication. No legal issues currently. Spouse corroborates. Spouse does all the cooking.       No previous neuropsych. Since I saw him last:  This patient generated a mixed normal/abnormal  range Neuropsychological Evaluation with respect to neurocognitive functioning. In this regard, he is showing marked impairment with auditory learning and memory and mild impairments with visual attention and verbal fluency. Otherwise, his confrontation naming, working memory, processing speed, perceptual reasoning, verbal comprehension, executive functioning, and bilateral fine motor dexterity were normal.  Vision issues and medication changes are not likely the basis for his generated combination of neurocognitive strengths and weaknesses. From an emotional standpoint, there is support for both anxiety and depression. The anxiety appears to be a combination of organic and functional factors and the depression appears mostly functional.  He has recently been started on a different medication for anxiety.                   In my opinion, this profile is consistent with an evolving organic process that is currently at a mild level of severity. Mood issues exacerbate, but currently the profile is not purely consistent with pseudodementia. Early stage AD is likely. In addition to continued medical care, my recommendations include consideration for memory management medication. Consider also treatment for attention if not medically contraindicated (the problem is mild). I also recommend continued treatment for anxiety and counseling to assist with adjustment related depression and self-esteem/self-concept issues.   .  The patient should be encouraged to remain as mentally, socially, and physically active as possible.                   The patient's generated cognitive difficulties are significant to the degree whereby it is my opinion that he should not live independently without appropriate supervision for those domains with a heavy memory emphasis. This includes medication management supervision and supervision of financial dealings. I am not overly concerned about driving but using a GPS may be wise when he is driving alone. The problem has been caught early on, and I hope he recognizes this as positive. I am not currently concerned about competency. Baseline now established. Follow up one year or  prn. Clinical correlation is, of course, indicated.                 I will discuss these findings with the patient and family when they follow up with me in the near future. A follow up Neuropsychological Evaluation is indicated on a prn basis.       DIAGNOSES: Dementia - Mild                          Anxiety - Mild to moderate                          Depression - Mild    Since I saw him last, the spouse reported that he is now on Aricept and no major changes have been noted over the past six months. He is on 10 mg. He has not noticed any changes in his memory. Things are about the same. No problems with driving. Spouse assists with medications, finances, day-to-day chores, etc.  He continues to struggle with anxiety, and is on fluoxetine 10 mg for anxiety and depression. IT may be helping some, but he still gets a lot of anxious. I will send a note to Dr. Higinio Montez about that. No new acute or focal issues. No new medical issues. No new stroke, head injury, seizures, meningitis and such. He did have tremor and Dr. Higinio Montez started him on levodopa and he stills jumps a bit but not shaking as much. No other major psychiatric concerns. No other major medical issues. Sounds like he is doing well.      Neuropsychological Mental Status Exam (NMSE):  Historian: Good  Praxis: No UE apraxia  R/L Orientation: Intact to self and to other  Dress: within normal limits   Weight: within normal limits   Appearance/Hygiene: within normal limits   Gait: within normal limits   Assistive Devices: hearing aids, glasses  Mood: within normal limits   Affect: within normal limits   Comprehension: within normal limits   Thought Process: within normal limits   Expressive Language: within normal limits   Receptive Language: within normal limits   Motor:  No cognitive or motor perseveration  ETOH: rarely  Tobacco: Denied  Illicit: Denied  SI/HI: Denied  Psychosis: Denied  Insight: Within normal limits  Judgment: Within normal limits  Other Psych:      Past Medical History:   Diagnosis Date    Anxiety     Aortic insufficiency regurgitation 11/9/2010    Arthritis     neck    Atrial flutter (HCC)     cardioversion 5/18/11    BPH (benign prostatic hyperplasia)     Hypothyroid     Migraines     mobitz 1, atrioventricular block 11/9/2010       Past Surgical History:   Procedure Laterality Date    ECHO 2D ADULT  9/2010    normal LV wall motion and ejection fraction, left ventricular enlargement, left atrial enlargement, severe aortic regurgitation and mild mitral regurgitation. The ejection fraction was 55-60%.  ECHO 2D ADULT  2/2011    mild LVE, EF 58%, LAE, large Ao root, Severe AI, 2+MR    ECHO 2D ADULT  6/2011    LVH, EF 60%, trace -mild MR    HX CATARACT REMOVAL      RIGHT EYE    HX GI      inginal hernia repair 2008    HX HEART CATHETERIZATION  1987    normal coronary arteries normal left ventricular function.  HX HEART CATHETERIZATION  2011    Normal coronaries    HX HEART VALVE SURGERY  4/2011    AVR, [address] Epic porcine    HX HERNIA REPAIR      HX PACEMAKER  1987    VVI, after stopping \"voltarin\" his block resolved.  Subsequently battery depleted in 2002 and was not replaced       Allergies   Allergen Reactions    Gantrisin Rash       Family History   Problem Relation Age of Onset    Alcohol abuse Mother     Lung Disease Mother EMPHYSEMA    Cancer Mother         LUNG CA    Stroke Mother     Heart Disease Father         MI       Social History     Tobacco Use    Smoking status: Never Smoker    Smokeless tobacco: Never Used   Substance Use Topics    Alcohol use: Yes     Comment: OCCASIONAL ETOH    Drug use: Yes     Types: Prescription       Current Outpatient Medications   Medication Sig Dispense Refill    donepezil (ARICEPT) 5 mg tablet Take  by mouth nightly.  aspirin delayed-release 81 mg tablet Take  by mouth daily.  levothyroxine (SYNTHROID) 125 mcg tablet Take  by mouth Daily (before breakfast). Plan:  Obtain authorization for testing from insurance company. Report to follow once testing, scoring, and interpretation completed. ? Organic based neurocognitive issues versus mood disorder or combination of same. ? Problems organic, functional, or both? This note will not be viewable in [address] 77year old male seen last year here for six month follow up with dementia. Seems like things are stable from a memory standpoint so would not need updated testing at this point, so we will evaluate again in six months for a total of one year between first exam and second. He does continue to have anxiety so I suggest they discuss this with Dr. Magalys Rivera. Will await six months for testing.        Time: [id number] x 1 NSE 40 minutes with patient, review of records, time with spouse      Please call wife cell [phone]

## 2019-02-12 ENCOUNTER — OFFICE VISIT (OUTPATIENT)
Dept: SLEEP MEDICINE | Age: 69
End: 2019-02-12

## 2019-02-12 VITALS
HEIGHT: 72 IN | WEIGHT: 174.4 LBS | SYSTOLIC BLOOD PRESSURE: 111 MMHG | OXYGEN SATURATION: 97 % | DIASTOLIC BLOOD PRESSURE: 76 MMHG | HEART RATE: 64 BPM | BODY MASS INDEX: 23.62 KG/M2

## 2019-02-12 DIAGNOSIS — G47.33 OBSTRUCTIVE SLEEP APNEA (ADULT) (PEDIATRIC): Primary | ICD-10-CM

## 2019-02-12 DIAGNOSIS — G47.52 REM BEHAVIORAL DISORDER: ICD-10-CM

## 2019-02-12 RX ORDER — FLUOXETINE 10 MG/1
10 CAPSULE ORAL DAILY
Refills: 3 | COMMUNITY
Start: 2019-02-04 | End: 2020-02-19 | Stop reason: DRUGHIGH

## 2019-02-12 RX ORDER — CARBIDOPA AND LEVODOPA 25; 250 MG/1; MG/1
25 TABLET ORAL 3 TIMES DAILY
Refills: 2 | COMMUNITY
Start: 2019-02-04 | End: 2020-02-19

## 2019-02-12 RX ORDER — TAMSULOSIN HYDROCHLORIDE 0.4 MG/1
0.4 CAPSULE ORAL 2 TIMES DAILY
Refills: 3 | COMMUNITY
Start: 2019-02-09

## 2019-02-12 NOTE — PROGRESS NOTES
[address] [place], [address]  Tel.  [phone]  Fax. [address] [facility] [address]  [place], [address] [facility]  Tel.  [phone]  Fax. [phone] [address] [facility] [address]  Tel.  [phone]  Fax. [phone]         Subjective:      Geovany Tijerina is an 77 y.o. male referred for evaluation for a sleep disorder. He complains of frequent awakenings associated with excessive daytime sleepiness, difficulty falling asleep once awakened, feels sleepy during the day, take naps during the day. Symptoms began a few years ago, gradually worsening since that time. He usually can fall asleep in variable minutes. Family or house members note snoring, snorting, periods of not breathing. He denies falling asleep while during conversation. Geovany Tijerina does wake up frequently at night. He is bothered by waking up too early and left unable to get back to sleep. He actually sleeps about 5 hours at night and wakes up about 5 times during the night. He does not work shifts: [facility] Charlaine Duverney indicates he does get too little sleep at night. His bedtime is 2200. He awakens at 0200. He does take naps. He takes 7 naps a week lasting 1. He has the following observed behaviors: Loud snoring, Sleep talking, Pauses in breathing, Twitching of legs or feet, Grinding teeth, Kicking with legs, Sitting up in bed while still asleep; Nightmares. Other remarks: vivid dreams, waking with gasping or snort  He kicks and punches in his dreams which are usually bad dreams. This does not happen nightly  Canby Sleepiness Score: 11   which reflect mild daytime drowsiness. Allergies   Allergen Reactions    Gantrisin Rash         Current Outpatient Medications:     FLUoxetine (PROZAC) 10 mg capsule, Take 10 mg by mouth daily. , Disp: , Rfl: 3    carbidopa-levodopa (SINEMET)  mg per tablet, Take 25 Tabs by mouth three (3) times daily. , Disp: , Rfl: 2    tamsulosin (FLOMAX) 0.4 mg capsule, Take 0.4 mg by mouth daily. , Disp: , Rfl: 3    donepezil (ARICEPT) 5 mg tablet, Take  by mouth nightly., Disp: , Rfl:     aspirin delayed-release 81 mg tablet, Take  by mouth daily. , Disp: , Rfl:     levothyroxine (SYNTHROID) 125 mcg tablet, Take  by mouth Daily (before breakfast). , Disp: , Rfl:      He  has a past medical history of Anxiety, Aortic insufficiency regurgitation (11/9/2010), Arthritis, Atrial flutter ([address] BPH (benign prostatic hyperplasia), Hypothyroid, Migraines, and mobitz 1, atrioventricular block (11/9/2010). He  has a past surgical history that includes hx pacemaker (1987); echo 2d adult (9/2010); echo 2d adult (2/2011); hx cataract removal; hx heart valve surgery (4/2011); hx gi; hx heart catheterization (1987); hx heart catheterization (2011); echo 2d adult (6/2011); and hx hernia repair. He family history includes Alcohol abuse in his mother; Cancer in his mother; Heart Disease in his father; Lung Disease in his mother; Stroke in his mother. He  reports that  has never smoked. he has never used smokeless tobacco. He reports that he does not drink alcohol or use drugs. Review of Systems:  Constitutional:  +weight loss  Eyes:  No blurred vision. CVS:  No significant chest pain  Pulm:  + significant shortness of breath  GI:  No significant nausea or vomiting  :  No significant nocturia  Musculoskeletal:  +significant joint pain at night  Skin:  No significant rashes  Neuro:  No significant dizziness . Treated for Parkinson's and dementia  Psych:  +anxiety    Sleep Review of Systems: notable for + difficulty falling asleep; +frequent awakenings at night;  regular dreaming noted; + nightmares ; no early morning headaches; + memory problems; no concentration issues; no history of any automobile or occupational accidents due to daytime drowsiness.       Objective:     Visit Vitals  /76 (BP 1 Location: Left arm)   Pulse 64   Ht 6' (1.829 m)   Wt 174 lb 6.4 oz (79.1 kg)   SpO2 97%   BMI 23.65 kg/m²         General:   Not in acute distress   Eyes:  Anicteric sclerae, no obvious strabismus   Nose:  No obvious nasal septum deviation    Oropharynx:   Class 3 oropharyngeal outlet, thick tongue base, enlarged and boggy uvula, low-lying soft palate, narrow tonsilo-pharyngeal pilars   Tonsils:   tonsils are present and normal   Neck:   Neck circ. in \"inches\": 16; midline trachea   Chest/Lungs:  Equal lung expansion, clear on auscultation    CVS:  Normal rate, regular rhythm; no JVD   Skin:  Warm to touch; no obvious rashes   Neuro:  No focal deficits ; no obvious tremor    Psych:  Normal affect,  normal countenance;          Assessment:       ICD-10-CM ICD-9-CM    1. Obstructive sleep apnea (adult) (pediatric) G47.33 327.23 POLYSOMNOGRAPHY 1 NIGHT   2. REM behavioral disorder G47.52 327.42          Plan:     * The patient currently has a High Risk for having sleep apnea. STOP-BANG score 6.  * PSG was ordered for initial evaluation. We will follow the American Academy of Sleep Medicine protocol regarding split-night procedures and offering a trial of Positive Airway Pressure (CPAP, BPAP, etc.)  * He was provided information on sleep apnea including coresponding risk factors and the importance of proper treatment. * Counseling was provided regarding proper sleep hygiene and safe driving. * Return for follow-up shortly after sleep study to go over results and to discuss treatment options if indicated. 2. REM Behavior Disorder - I have reviewed the importance of ensuring a safe sleeping environment. Sleeping in a sleeping bag will keep arms and legs contained and avoid having the bed partner hurt. Separate beds is an option or sleeping on the floor or in a recliner if it prevents getting out of the bed. Medications that can aggravated REM behavior disorder were reviewed. Thank you for allowing us to participate in your patient's medical care.   We'll keep you updated on these investigations.   Electronically signed by    Aleena Naidu MD  Diplomate in Sleep Medicine  [facility]

## 2019-02-12 NOTE — PATIENT INSTRUCTIONS
[address] [place]. [address], [address]  Tel.  [phone]  Fax. 100 [facility] 60  [address], [address] [facility]  Tel.  [phone]  Fax. [phone] [address]  Tel.  [phone]  Fax. [phone]     Sleep Apnea: After Your Visit  Your Care Instructions  Sleep apnea occurs when you frequently stop breathing for 10 seconds or longer during sleep. It can be mild to severe, based on the number of times per hour that you stop breathing or have slowed breathing. Blocked or narrowed airways in your nose, mouth, or throat can cause sleep apnea. Your airway can become blocked when your throat muscles and tongue relax during sleep. Sleep apnea is common, occurring in 1 out of 20 individuals. Individuals having any of the following characteristics should be evaluated and treated right away due to high risk and detrimental consequences from untreated sleep apnea:  1. Obesity  2. Congestive Heart failure  3. Atrial Fibrillation  4. Uncontrolled Hypertension  5. Type II Diabetes  6. Night-time Arrhythmias  7. Stroke  8. Pulmonary Hypertension  9. High-risk Driving Populations (pilots, truck drivers, etc.)  10. Patients Considering Weight-loss Surgery    How do you know you have sleep apnea? You probably have sleep apnea if you answer 'yes' to 3 or more of the following questions:  S - Have you been told that you Snore? T - Are you often Tired during the day? O - Has anyone Observed you stop breathing while sleeping? P- Do you have (or are being treated for) high blood Pressure? B - Are you obese (Body Mass Index > 35)? A - Is your Age 48years old or older? N - Is your Neck size greater than 16 inches? G - Are you male Gender? A sleep physician can prescribe a breathing device that prevents tissues in the throat from blocking your airway.  Or your doctor may recommend using a dental device (oral breathing device) to help keep your airway open. In some cases, surgery may be needed to remove enlarged tissues in the throat. Follow-up care is a key part of your treatment and safety. Be sure to make and go to all appointments, and call your doctor if you are having problems. It's also a good idea to know your test results and keep a list of the medicines you take. How can you care for yourself at home? · Lose weight, if needed. It may reduce the number of times you stop breathing or have slowed breathing. · Go to bed at the same time every night. · Sleep on your side. It may stop mild apnea. If you tend to roll onto your back, sew a pocket in the back of your pajama top. Put a tennis ball into the pocket, and stitch the pocket shut. This will help keep you from sleeping on your back. · Avoid alcohol and medicines such as sleeping pills and sedatives before bed. · Do not smoke. Smoking can make sleep apnea worse. If you need help quitting, talk to your doctor about stop-smoking programs and medicines. These can increase your chances of quitting for good. · Prop up the head of your bed 4 to 6 inches by putting bricks under the legs of the bed. · Treat breathing problems, such as a stuffy nose, caused by a cold or allergies. · Use a continuous positive airway pressure (CPAP) breathing machine if lifestyle changes do not help your apnea and your doctor recommends it. The machine keeps your airway from closing when you sleep. · If CPAP does not help you, ask your doctor whether you should try other breathing machines. A bilevel positive airway pressure machine has two types of air pressureâone for breathing in and one for breathing out. Another device raises or lowers air pressure as needed while you breathe. · If your nose feels dry or bleeds when using one of these machines, talk with your doctor about increasing moisture in the air. A humidifier may help.   · If your nose is runny or stuffy from using a breathing machine, talk with your doctor about using decongestants or a corticosteroid nasal spray. When should you call for help? Watch closely for changes in your health, and be sure to contact your doctor if:  · You still have sleep apnea even though you have made lifestyle changes. · You are thinking of trying a device such as CPAP. · You are having problems using a CPAP or similar machine. Where can you learn more? Go to Boxbe. Enter K982 in the search box to learn more about \"Sleep Apnea: After Your Visit. \"   © 7904-0442 Healthwise, Incorporated. Care instructions adapted under license by [address] [place] [address] (which disclaims liability or warranty for this information). This care instruction is for use with your licensed healthcare professional. If you have questions about a medical condition or this instruction, always ask your healthcare professional. Frosty Brightly any warranty or liability for your use of this information. PROPER SLEEP HYGIENE    What to avoid  · Do not have drinks with caffeine, such as coffee or black tea, for 8 hours before bed. · Do not smoke or use other types of tobacco near bedtime. Nicotine is a stimulant and can keep you awake. · Avoid drinking alcohol late in the evening, because it can cause you to wake in the middle of the night. · Do not eat a big meal close to bedtime. If you are hungry, eat a light snack. · Do not drink a lot of water close to bedtime, because the need to urinate may wake you up during the night. · Do not read or watch TV in bed. Use the bed only for sleeping and sexual activity. What to try  · Go to bed at the same time every night, and wake up at the same time every morning. Do not take naps during the day. · Keep your bedroom quiet, dark, and cool. · Get regular exercise, but not within 3 to 4 hours of your bedtime. .  · Sleep on a comfortable pillow and mattress.   · If watching the clock makes you anxious, turn it facing away from you so you cannot see the time. · If you worry when you lie down, start a worry book. Well before bedtime, write down your worries, and then set the book and your concerns aside. · Try meditation or other relaxation techniques before you go to bed. · If you cannot fall asleep, get up and go to another room until you feel sleepy. Do something relaxing. Repeat your bedtime routine before you go to bed again. · Make your house quiet and calm about an hour before bedtime. Turn down the lights, turn off the TV, log off the computer, and turn down the volume on music. This can help you relax after a busy day. Drowsy Driving  The [address] [place] [address] Road Traffic Safety Administration cites drowsiness as a causing factor in more than 805,278 police reported crashes annually, resulting in 76,000 injuries and 1,500 deaths. Other surveys suggest 55% of people polled have driven while drowsy in the past year, 23% had fallen asleep but not crashed, 3% crashed, and 2% had and accident due to drowsy driving. Who is at risk? Young Drivers: One study of drowsy driving accidents states that 55% of the drivers were under 25 years. Of those, 75% were male. Shift Workers and Travelers: People who work overnight or travel across time zones frequently are at higher risk of experiencing Circadian Rhythm Disorders. They are trying to work and function when their body is programed to sleep. Sleep Deprived: Lack of sleep has a serious impact on your ability to pay attention or focus on a task. Consistently getting less than the average of 8 hours your body needs creates partial or cumulative sleep deprivation. Untreated Sleep Disorders: Sleep Apnea, Narcolepsy, R.L.S., and other sleep disorders (untreated) prevent a person from getting enough restful sleep. This leads to excessive daytime sleepiness and increases the risk for drowsy driving accidents by up to 7 times.   Medications / Alcohol: Even over the counter medications can cause drowsiness. Medications that impair a drivers attention should have a warning label. Alcohol naturally makes you sleepy and on its own can cause accidents. Combined with excessive drowsiness its effects are amplified. Signs of Drowsy Driving:   * You don't remember driving the last few miles   * You may drift out of your aydin   * You are unable to focus and your thoughts wander   * You may yawn more often than normal   * You have difficulty keeping your eyes open / nodding off   * Missing traffic signs, speeding, or tailgating  Prevention-   Good sleep hygiene, lifestyle and behavioral choices have the most impact on drowsy driving. There is no substitute for sleep and the average person requires 8 hours nightly. If you find yourself driving drowsy, stop and sleep. Consider the sleep hygiene tips provided during your visit as well. Medication Refill Policy: Refills for all medications require 1 week advance notice. Please have your pharmacy fax a refill request. We are unable to fax, or call in \"controled substance\" medications and you will need to pick these prescriptions up from our office. CrowdMob Activation    Thank you for requesting access to CrowdMob. Please follow the instructions below to securely access and download your online medical record. CrowdMob allows you to send messages to your doctor, view your test results, renew your prescriptions, schedule appointments, and more. How Do I Sign Up? 1. In your internet browser, go to [email or web address]. 2. Click on the First Time User? Click Here link in the Sign In box. You will see the New Member Sign Up page. 3. Enter your CrowdMob Access Code exactly as it appears below. You will not need to use this code after youve completed the sign-up process. If you do not sign up before the expiration date, you must request a new code.     CrowdMob Access Code: [email or web address]  Expires: 3/18/2019 11:07 AM (This is the date your Qustreet access code will )    4. Enter the last four digits of your Social Security Number (xxxx) and Date of Birth (mm/dd/yyyy) as indicated and click Submit. You will be taken to the next sign-up page. 5. Create a Montalvo Systemst ID. This will be your Qustreet login ID and cannot be changed, so think of one that is secure and easy to remember. 6. Create a Qustreet password. You can change your password at any time. 7. Enter your Password Reset Question and Answer. This can be used at a later time if you forget your password. 8. Enter your e-mail address. You will receive e-mail notification when new information is available in [address] 9. Click Sign Up. You can now view and download portions of your medical record. 10. Click the Download Summary menu link to download a portable copy of your medical information. Additional Information    If you have questions, please call [phone]. Remember, Qustreet is NOT to be used for urgent needs. For medical emergencies, dial 911.

## 2019-03-22 ENCOUNTER — HOSPITAL ENCOUNTER (OUTPATIENT)
Dept: SLEEP MEDICINE | Age: 69
Discharge: HOME | End: 2019-03-22
Payer: MEDICARE

## 2019-03-22 VITALS
TEMPERATURE: 99.6 F | BODY MASS INDEX: 24.03 KG/M2 | WEIGHT: 177.4 LBS | SYSTOLIC BLOOD PRESSURE: 103 MMHG | HEART RATE: 61 BPM | OXYGEN SATURATION: 98 % | HEIGHT: 72 IN | DIASTOLIC BLOOD PRESSURE: 67 MMHG

## 2019-03-22 DIAGNOSIS — G47.52 REM BEHAVIORAL DISORDER: ICD-10-CM

## 2019-03-22 DIAGNOSIS — G47.33 OBSTRUCTIVE SLEEP APNEA (ADULT) (PEDIATRIC): ICD-10-CM

## 2019-03-22 PROCEDURE — 95810 POLYSOM 6/> YRS 4/> PARAM: CPT | Performed by: INTERNAL MEDICINE

## 2019-03-29 ENCOUNTER — DOCUMENTATION ONLY (OUTPATIENT)
Dept: SLEEP MEDICINE | Age: 69
End: 2019-03-29

## 2019-03-29 NOTE — PROGRESS NOTES
This note is being routed to [facility]. Sleep Medicine consult note and sleep study report in patient's chart for review.     Thank you for the referral.

## 2019-04-09 ENCOUNTER — OFFICE VISIT (OUTPATIENT)
Dept: SLEEP MEDICINE | Age: 69
End: 2019-04-09

## 2019-04-09 VITALS
SYSTOLIC BLOOD PRESSURE: 107 MMHG | HEART RATE: 64 BPM | WEIGHT: 178.6 LBS | OXYGEN SATURATION: 97 % | HEIGHT: 72 IN | RESPIRATION RATE: 16 BRPM | BODY MASS INDEX: 24.19 KG/M2 | DIASTOLIC BLOOD PRESSURE: 67 MMHG

## 2019-04-09 DIAGNOSIS — G47.52 REM BEHAVIORAL DISORDER: Primary | ICD-10-CM

## 2019-04-09 NOTE — PATIENT INSTRUCTIONS
[address] [facility] [place]. [address], [address] Tel.  [phone] Fax. 100 [facility] [address] [place], [address] [facility] [address] [facility] [address] Tel.  [phone] Fax. [phone] [address] Tel.  [phone] Fax. [phone] PROPER SLEEP HYGIENE What to avoid · Do not have drinks with caffeine, such as coffee or black tea, for 8 hours before bed. · Do not smoke or use other types of tobacco near bedtime. Nicotine is a stimulant and can keep you awake. · Avoid drinking alcohol late in the evening, because it can cause you to wake in the middle of the night. · Do not eat a big meal close to bedtime. If you are hungry, eat a light snack. · Do not drink a lot of water close to bedtime, because the need to urinate may wake you up during the night. · Do not read or watch TV in bed. Use the bed only for sleeping and sexual activity. What to try · Go to bed at the same time every night, and wake up at the same time every morning. Do not take naps during the day. · Keep your bedroom quiet, dark, and cool. · Get regular exercise, but not within 3 to 4 hours of your bedtime. Arlene Strickland · Sleep on a comfortable pillow and mattress. · If watching the clock makes you anxious, turn it facing away from you so you cannot see the time. · If you worry when you lie down, start a worry book. Well before bedtime, write down your worries, and then set the book and your concerns aside. · Try meditation or other relaxation techniques before you go to bed. · If you cannot fall asleep, get up and go to another room until you feel sleepy. Do something relaxing. Repeat your bedtime routine before you go to bed again. · Make your house quiet and calm about an hour before bedtime. Turn down the lights, turn off the TV, log off the computer, and turn down the volume on music. This can help you relax after a busy day. Drowsy Driving The [facility] 54 cites drowsiness as a causing factor in more than 184,976 police reported crashes annually, resulting in 76,000 injuries and 1,500 deaths. Other surveys suggest 55% of people polled have driven while drowsy in the past year, 23% had fallen asleep but not crashed, 3% crashed, and 2% had and accident due to drowsy driving. Who is at risk? Young Drivers: One study of drowsy driving accidents states that 55% of the drivers were under 25 years. Of those, 75% were male. Shift Workers and Travelers: People who work overnight or travel across time zones frequently are at higher risk of experiencing Circadian Rhythm Disorders. They are trying to work and function when their body is programed to sleep. Sleep Deprived: Lack of sleep has a serious impact on your ability to pay attention or focus on a task. Consistently getting less than the average of 8 hours your body needs creates partial or cumulative sleep deprivation. Untreated Sleep Disorders: Sleep Apnea, Narcolepsy, R.L.S., and other sleep disorders (untreated) prevent a person from getting enough restful sleep. This leads to excessive daytime sleepiness and increases the risk for drowsy driving accidents by up to 7 times. Medications / Alcohol: Even over the counter medications can cause drowsiness. Medications that impair a drivers attention should have a warning label. Alcohol naturally makes you sleepy and on its own can cause accidents. Combined with excessive drowsiness its effects are amplified. Signs of Drowsy Driving: * You don't remember driving the last few miles * You may drift out of your aydin * You are unable to focus and your thoughts wander * You may yawn more often than normal 
 * You have difficulty keeping your eyes open / nodding off * Missing traffic signs, speeding, or tailgating Prevention-  
Good sleep hygiene, lifestyle and behavioral choices have the most impact on drowsy driving. There is no substitute for sleep and the average person requires 8 hours nightly. If you find yourself driving drowsy, stop and sleep. Consider the sleep hygiene tips provided during your visit as well. Medication Refill Policy: Refills for all medications require 1 week advance notice. Please have your pharmacy fax a refill request. We are unable to fax, or call in \"controled substance\" medications and you will need to pick these prescriptions up from our office. Ziplocalhart Activation Thank you for requesting access to [employer] Solar [employer]centrator. Please follow the instructions below to securely access and download your online medical record. Abs[employer] Solar Concentrator allows you to send messages to your doctor, view your test results, renew your prescriptions, schedule appointments, and more. How Do I Sign Up? 1. In your internet browser, go to [email or web address]. 2. Click on the First Time User? Click Here link in the Sign In box. You will see the New Member Sign Up page. 3. Enter your [employer]rator Access Code exactly as it appears below. You will not need to use this code after youve completed the sign-up process. If you do not sign up before the expiration date, you must request a new code. [employer]rator Access Code: [email or web address] Expires: 2019  7:39 PM (This is the date your [employer]rator access code will ) 4. Enter the last four digits of your Social Security Number (xxxx) and Date of Birth (mm/dd/yyyy) as indicated and click Submit. You will be taken to the next sign-up page. 5. Create a [facility]`t ID. This will be your Absolicon Solar Concentrator login ID and cannot be changed, so think of one that is secure and easy to remember. 6. Create a Abs[employer]rator password. You can change your password at any time. 7. Enter your Password Reset Question and Answer. This can be used at a later time if you forget your password. 8. Enter your e-mail address.  You will receive e-mail notification when new information is available in Voovio aka 3Ditize. 9. Click Sign Up. You can now view and download portions of your medical record. 10. Click the Download Summary menu link to download a portable copy of your medical information. Additional Information If you have questions, please call [phone]. Remember, Voovio aka 3Ditize is NOT to be used for urgent needs. For medical emergencies, dial 911.

## 2019-04-09 NOTE — PROGRESS NOTES
217 [facility] [place]. [address] [facility], [address] Tel.  [phone] Fax. 100 [facility] 60 [place], [address] [facility] [address] Tel.  [phone] Fax. [phone] [address] Tel.  [phone] Fax. [phone] S>Freddie Negron is a 77 y.o. male seen for Sleep Problem PSG reviewed. No significant apnea found. He did have increased tone in REM sleep and gives a history consistent with REM behavior disorder Allergies Allergen Reactions  Gantrisin Rash He has a current medication list which includes the following prescription(s): fluoxetine, carbidopa-levodopa, tamsulosin, donepezil, aspirin delayed-release, and levothyroxine. Rosalba Riser He  has a past medical history of Anxiety, Aortic insufficiency regurgitation (11/9/2010), Arthritis, Atrial flutter ([address] BPH (benign prostatic hyperplasia), Hypothyroid, Migraines, and mobitz 1, atrioventricular block (11/9/2010). O>   
Visit Vitals /67 (BP 1 Location: Left arm, BP Patient Position: Sitting) Pulse 64 Resp 16 Ht 6' (1.829 m) Wt 178 lb 9.6 oz (81 kg) SpO2 97% BMI 24.22 kg/m² General:   Alert, oriented, not in distress Neck:   No JVD Chest/Lungs:  symetrical lung expansion , no accessory muscle use Extremities:  no obvious rashes , negative edema Neuro:  No focal deficits ; No obvious tremor Psych:  Normal affect ,  Normal countenance ;    
 
A> 
  ICD-10-CM ICD-9-CM 1. REM behavioral disorder G47.52 327.42   
 
 
 
 
P> 
 
 
 
REM Behavior Disorder - I have reviewed the importance of ensuring a safe sleeping environment. Sleeping in a sleeping bag will keep arms and legs contained and avoid having the bed partner hurt. Separate beds is an option or sleeping on the floor or in a recliner if it prevents getting out of the bed.   Medications (fluoxetine) that can aggravated REM behavior disorder were reviewed. melatonin has been shown to be helpful. He can start melatonin 1 mg at bedtime. He can increase up to  3 mg. If melatonin proves suboptimal in controlling behavior, clonazepam 0.25 mg - 0.5 mg can be considered. Proper sleep hygiene and safe driving were reviewed today and all of his questions were addressed. Electronically signed by 
 
Eben Dupont MD 
Diplomate in Sleep Medicine [facility]

## 2019-06-04 ENCOUNTER — HOSPITAL ENCOUNTER (OUTPATIENT)
Dept: CT IMAGING | Age: 69
Discharge: HOME | End: 2019-06-04
Attending: SPECIALIST
Payer: MEDICARE

## 2019-06-04 DIAGNOSIS — Z86.010 PERSONAL HISTORY OF COLONIC POLYPS: ICD-10-CM

## 2019-06-04 DIAGNOSIS — R13.10 DYSPHAGIA: ICD-10-CM

## 2019-06-04 DIAGNOSIS — R14.0 BLOATING SYMPTOM: ICD-10-CM

## 2019-06-04 DIAGNOSIS — R10.10 UPPER ABDOMINAL PAIN, UNSPECIFIED: ICD-10-CM

## 2019-06-04 LAB — CREAT BLD-MCNC: 1.1 MG/DL (ref 0.6–1.3)

## 2019-06-04 PROCEDURE — 74011636320 HC RX REV CODE- 636/320: Performed by: SPECIALIST

## 2019-06-04 PROCEDURE — 82565 ASSAY OF CREATININE: CPT

## 2019-06-04 PROCEDURE — 74177 CT ABD & PELVIS W/CONTRAST: CPT

## 2019-06-04 RX ADMIN — IOPAMIDOL 100 ML: 755 INJECTION, SOLUTION INTRAVENOUS at 15:00

## 2019-06-27 ENCOUNTER — HOSPITAL ENCOUNTER (OUTPATIENT)
Dept: ULTRASOUND IMAGING | Age: 69
Discharge: HOME | End: 2019-06-27
Attending: PHYSICIAN ASSISTANT
Payer: MEDICARE

## 2019-06-27 DIAGNOSIS — R93.3 ABNORMAL FINDINGS ON DIAGNOSTIC IMAGING OF OTHER PARTS OF DIGESTIVE TRACT: ICD-10-CM

## 2019-06-27 DIAGNOSIS — R10.10 UPPER ABDOMINAL PAIN: ICD-10-CM

## 2019-06-27 PROCEDURE — 76705 ECHO EXAM OF ABDOMEN: CPT

## 2019-09-16 ENCOUNTER — HOSPITAL ENCOUNTER (EMERGENCY)
Age: 69
Discharge: HOME | End: 2019-09-16
Attending: EMERGENCY MEDICINE
Payer: MEDICARE

## 2019-09-16 ENCOUNTER — APPOINTMENT (OUTPATIENT)
Dept: GENERAL RADIOLOGY | Age: 69
End: 2019-09-16
Attending: EMERGENCY MEDICINE
Payer: MEDICARE

## 2019-09-16 ENCOUNTER — APPOINTMENT (OUTPATIENT)
Dept: CT IMAGING | Age: 69
End: 2019-09-16
Attending: EMERGENCY MEDICINE
Payer: MEDICARE

## 2019-09-16 VITALS
OXYGEN SATURATION: 96 % | HEART RATE: 85 BPM | SYSTOLIC BLOOD PRESSURE: 120 MMHG | TEMPERATURE: 98.1 F | DIASTOLIC BLOOD PRESSURE: 72 MMHG | RESPIRATION RATE: 17 BRPM

## 2019-09-16 DIAGNOSIS — N30.00 ACUTE CYSTITIS WITHOUT HEMATURIA: Primary | ICD-10-CM

## 2019-09-16 DIAGNOSIS — R53.83 FATIGUE, UNSPECIFIED TYPE: ICD-10-CM

## 2019-09-16 LAB
ALBUMIN SERPL-MCNC: 3.3 G/DL (ref 3.5–5)
ALBUMIN/GLOB SERPL: 1.1 {RATIO} (ref 1.1–2.2)
ALP SERPL-CCNC: 56 U/L (ref 45–117)
ALT SERPL-CCNC: 22 U/L (ref 12–78)
ANION GAP SERPL CALC-SCNC: 7 MMOL/L (ref 5–15)
APPEARANCE UR: ABNORMAL
AST SERPL-CCNC: 19 U/L (ref 15–37)
BACTERIA URNS QL MICRO: ABNORMAL /HPF
BASOPHILS # BLD: 0 K/UL (ref 0–0.1)
BASOPHILS NFR BLD: 0 % (ref 0–1)
BILIRUB SERPL-MCNC: 0.5 MG/DL (ref 0.2–1)
BILIRUB UR QL: NEGATIVE
BNP SERPL-MCNC: 266 PG/ML (ref 0–125)
BUN SERPL-MCNC: 13 MG/DL (ref 6–20)
BUN/CREAT SERPL: 11 (ref 12–20)
CALCIUM SERPL-MCNC: 8.5 MG/DL (ref 8.5–10.1)
CHLORIDE SERPL-SCNC: 105 MMOL/L (ref 97–108)
CK SERPL-CCNC: 213 U/L (ref 39–308)
CO2 SERPL-SCNC: 29 MMOL/L (ref 21–32)
COLOR UR: ABNORMAL
CREAT SERPL-MCNC: 1.23 MG/DL (ref 0.7–1.3)
DIFFERENTIAL METHOD BLD: ABNORMAL
EOSINOPHIL # BLD: 0.1 K/UL (ref 0–0.4)
EOSINOPHIL NFR BLD: 1 % (ref 0–7)
EPITH CASTS URNS QL MICRO: ABNORMAL /LPF
ERYTHROCYTE [DISTWIDTH] IN BLOOD BY AUTOMATED COUNT: 12.4 % (ref 11.5–14.5)
GLOBULIN SER CALC-MCNC: 2.9 G/DL (ref 2–4)
GLUCOSE SERPL-MCNC: 118 MG/DL (ref 65–100)
GLUCOSE UR STRIP.AUTO-MCNC: NEGATIVE MG/DL
HCT VFR BLD AUTO: 45.6 % (ref 36.6–50.3)
HGB BLD-MCNC: 15.7 G/DL (ref 12.1–17)
HGB UR QL STRIP: ABNORMAL
KETONES UR QL STRIP.AUTO: NEGATIVE MG/DL
LEUKOCYTE ESTERASE UR QL STRIP.AUTO: ABNORMAL
LYMPHOCYTES # BLD: 1.2 K/UL
LYMPHOCYTES NFR BLD: 12 % (ref 12–49)
MAGNESIUM SERPL-MCNC: 2.1 MG/DL (ref 1.6–2.4)
MCH RBC QN AUTO: 29.6 PG (ref 26–34)
MCHC RBC AUTO-ENTMCNC: 34.4 G/DL (ref 30–36.5)
MCV RBC AUTO: 86 FL (ref 80–99)
MONOCYTES # BLD: 0.7 K/UL (ref 0–1)
MONOCYTES NFR BLD: 7 % (ref 5–13)
MUCOUS THREADS URNS QL MICRO: ABNORMAL /LPF
NEUTS SEG # BLD: 8.5 K/UL (ref 1.8–8)
NEUTS SEG NFR BLD: 80 % (ref 32–75)
NITRITE UR QL STRIP.AUTO: NEGATIVE
PH UR STRIP: 6 [PH] (ref 5–8)
PLATELET # BLD AUTO: 285 K/UL (ref 150–400)
PMV BLD AUTO: 10.4 FL (ref 8.9–12.9)
POTASSIUM SERPL-SCNC: 3.7 MMOL/L (ref 3.5–5.1)
PROT SERPL-MCNC: 6.2 G/DL (ref 6.4–8.2)
PROT UR STRIP-MCNC: NEGATIVE MG/DL
RBC # BLD AUTO: 5.3 M/UL (ref 4.1–5.7)
RBC #/AREA URNS HPF: ABNORMAL /HPF (ref 0–5)
SODIUM SERPL-SCNC: 141 MMOL/L (ref 136–145)
SP GR UR REFRACTOMETRY: 1.01 (ref 1–1.03)
TROPONIN I SERPL-MCNC: <0.05 NG/ML
UR CULT HOLD, URHOLD: NORMAL
UROBILINOGEN UR QL STRIP.AUTO: 1 EU/DL (ref 0.2–1)
WBC # BLD AUTO: 10.5 K/UL (ref 4.1–11.1)
WBC URNS QL MICRO: ABNORMAL /HPF (ref 0–4)

## 2019-09-16 PROCEDURE — 87077 CULTURE AEROBIC IDENTIFY: CPT

## 2019-09-16 PROCEDURE — 87086 URINE CULTURE/COLONY COUNT: CPT

## 2019-09-16 PROCEDURE — 70450 CT HEAD/BRAIN W/O DYE: CPT

## 2019-09-16 PROCEDURE — 87186 SC STD MICRODIL/AGAR DIL: CPT

## 2019-09-16 PROCEDURE — 74011000250 HC RX REV CODE- 250: Performed by: EMERGENCY MEDICINE

## 2019-09-16 PROCEDURE — 80053 COMPREHEN METABOLIC PANEL: CPT

## 2019-09-16 PROCEDURE — 96374 THER/PROPH/DIAG INJ IV PUSH: CPT

## 2019-09-16 PROCEDURE — 82550 ASSAY OF CK (CPK): CPT

## 2019-09-16 PROCEDURE — 84484 ASSAY OF TROPONIN QUANT: CPT

## 2019-09-16 PROCEDURE — 83735 ASSAY OF MAGNESIUM: CPT

## 2019-09-16 PROCEDURE — 36415 COLL VENOUS BLD VENIPUNCTURE: CPT

## 2019-09-16 PROCEDURE — 81001 URINALYSIS AUTO W/SCOPE: CPT

## 2019-09-16 PROCEDURE — 83880 ASSAY OF NATRIURETIC PEPTIDE: CPT

## 2019-09-16 PROCEDURE — 71046 X-RAY EXAM CHEST 2 VIEWS: CPT

## 2019-09-16 PROCEDURE — 93005 ELECTROCARDIOGRAM TRACING: CPT

## 2019-09-16 PROCEDURE — 85025 COMPLETE CBC W/AUTO DIFF WBC: CPT

## 2019-09-16 PROCEDURE — 74011250636 HC RX REV CODE- 250/636: Performed by: EMERGENCY MEDICINE

## 2019-09-16 PROCEDURE — 99285 EMERGENCY DEPT VISIT HI MDM: CPT

## 2019-09-16 RX ORDER — CEPHALEXIN 500 MG/1
500 CAPSULE ORAL 3 TIMES DAILY
Qty: 21 CAP | Refills: 0 | Status: SHIPPED | OUTPATIENT
Start: 2019-09-16 | End: 2019-09-23

## 2019-09-16 RX ORDER — SODIUM CHLORIDE 0.9 % (FLUSH) 0.9 %
5-40 SYRINGE (ML) INJECTION AS NEEDED
Status: DISCONTINUED | OUTPATIENT
Start: 2019-09-16 | End: 2019-09-17 | Stop reason: HOSPADM

## 2019-09-16 RX ORDER — FLUDROCORTISONE ACETATE 0.1 MG/1
0.1 TABLET ORAL DAILY
COMMUNITY

## 2019-09-16 RX ORDER — FINASTERIDE 5 MG/1
5 TABLET, FILM COATED ORAL DAILY
COMMUNITY

## 2019-09-16 RX ORDER — CEPHALEXIN 500 MG/1
500 CAPSULE ORAL 3 TIMES DAILY
Qty: 21 CAP | Refills: 0 | Status: SHIPPED | OUTPATIENT
Start: 2019-09-16 | End: 2019-09-16

## 2019-09-16 RX ORDER — LANOLIN ALCOHOL/MO/W.PET/CERES
CREAM (GRAM) TOPICAL
COMMUNITY

## 2019-09-16 RX ORDER — SODIUM CHLORIDE 0.9 % (FLUSH) 0.9 %
5-40 SYRINGE (ML) INJECTION EVERY 8 HOURS
Status: DISCONTINUED | OUTPATIENT
Start: 2019-09-16 | End: 2019-09-17 | Stop reason: HOSPADM

## 2019-09-16 RX ADMIN — WATER 1 G: 1 INJECTION INTRAMUSCULAR; INTRAVENOUS; SUBCUTANEOUS at 20:17

## 2019-09-16 RX ADMIN — SODIUM CHLORIDE 500 ML: 900 INJECTION, SOLUTION INTRAVENOUS at 19:44

## 2019-09-16 NOTE — ED TRIAGE NOTES
Pt to ED with c/o feeling shaky and not being quite himself. Pt was in a golf tournament on Wednesday, overheated twice and had to be taken to the club house. Family states he has not been quite himself since then. Pt states he is having difficulty organizing his thoughts and SOB on exertion. Pt denies pain.

## 2019-09-16 NOTE — ED NOTES
As assessment continued, family mentioned that patient has a baseline hx of mild dementia. They do not believe that his symptoms are a progression of such but that it is related to the overheating events of Wednesday.

## 2019-09-17 LAB
ATRIAL RATE: 92 BPM
CALCULATED R AXIS, ECG10: -50 DEGREES
CALCULATED T AXIS, ECG11: 74 DEGREES
DIAGNOSIS, 93000: NORMAL
Q-T INTERVAL, ECG07: 362 MS
QRS DURATION, ECG06: 104 MS
QTC CALCULATION (BEZET), ECG08: 447 MS
VENTRICULAR RATE, ECG03: 92 BPM

## 2019-09-17 NOTE — DISCHARGE INSTRUCTIONS
We hope that we have addressed all of your medical concerns. The examination and treatment you received in the Emergency Department were for an emergent problem and were not intended as complete care. It is important that you follow up with your healthcare provider(s) for ongoing care. If your symptoms worsen or do not improve as expected, and you are unable to reach your usual health care provider(s), you should return to the Emergency Department. Today's healthcare is undergoing tremendous change, and patient satisfaction surveys are one of the many tools to assess the quality of medical care. You may receive a survey from the [employer] regarding your experience in the Emergency Department. I hope that your experience has been completely positive, particularly the medical care that I provided. As such, please participate in the survey; anything less than excellent does not meet my expectations or intentions. [facility] and [facility] participate in nationally recognized quality of care measures. If your blood pressure is greater than 120/80, as reported below, we urge that you seek medical care to address the potential of high blood pressure, commonly known as hypertension. Hypertension can be hereditary or can be caused by certain medical conditions, pain, stress, or \"white coat syndrome. \"       Please make an appointment with your health care provider(s) for follow up of your Emergency Department visit. VITALS:   Patient Vitals for the past 8 hrs:   Temp Pulse Resp BP SpO2   09/16/19 2000 -- 92 (!) 7 132/89 96 %   09/16/19 1950 -- 76 19 133/84 96 %   09/16/19 1930 -- -- -- -- 99 %   09/16/19 1909 -- 89 19 -- 95 %   09/16/19 1908 -- 91 17 127/82 --   09/16/19 1856 98.1 °F (36.7 °C) 78 16 127/81 95 %          Thank you for allowing us to provide you with medical care today.   We realize that you have many choices for your emergency care needs. Please choose us in the future for any continued health care needs. Brent Reza, [address].   Office: [phone]            Recent Results (from the past 24 hour(s))   EKG, 12 LEAD, INITIAL    Collection Time: 09/16/19  7:02 PM   Result Value Ref Range    Ventricular Rate 92 BPM    Atrial Rate 92 BPM    QRS Duration 104 ms    Q-T Interval 362 ms    QTC Calculation (Bezet) 447 ms    Calculated R Axis -50 degrees    Calculated T Axis 74 degrees    Diagnosis       Accelerated Junctional rhythm  Left anterior fascicular block  Moderate voltage criteria for LVH, may be normal variant  Cannot rule out Septal infarct (cited on or before 16-SEP-2019)  Abnormal ECG  When compared with ECG of 03-APR-2013 23:50,  Junctional rhythm has replaced Sinus rhythm  Vent. rate has increased BY  38 BPM  Questionable change in initial forces of Septal leads  QT has lengthened     CBC WITH AUTOMATED DIFF    Collection Time: 09/16/19  7:09 PM   Result Value Ref Range    WBC 10.5 4.1 - 11.1 K/uL    RBC 5.30 4. 10 - 5.70 M/uL    HGB 15.7 12.1 - 17.0 g/dL    HCT 45.6 36.6 - 50.3 %    MCV 86.0 80.0 - 99.0 FL    MCH 29.6 26.0 - 34.0 PG    MCHC 34.4 30.0 - 36.5 g/dL    RDW 12.4 11.5 - 14.5 %    PLATELET 381 856 - 157 K/uL    MPV 10.4 8.9 - 12.9 FL    NEUTROPHILS 80 (H) 32 - 75 %    LYMPHOCYTES 12 12 - 49 %    MONOCYTES 7 5 - 13 %    EOSINOPHILS 1 0 - 7 %    BASOPHILS 0 0 - 1 %    ABS. NEUTROPHILS 8.5 (H) 1.8 - 8.0 K/UL    ABS. LYMPHOCYTES 1.2 K/UL    ABS. MONOCYTES 0.7 0.0 - 1.0 K/UL    ABS. EOSINOPHILS 0.1 0.0 - 0.4 K/UL    ABS.  BASOPHILS 0.0 0.0 - 0.1 K/UL    DF AUTOMATED     METABOLIC PANEL, COMPREHENSIVE    Collection Time: 09/16/19  7:09 PM   Result Value Ref Range    Sodium 141 136 - 145 mmol/L    Potassium 3.7 3.5 - 5.1 mmol/L    Chloride 105 97 - 108 mmol/L    CO2 29 21 - 32 mmol/L    Anion gap 7 5 - 15 mmol/L    Glucose 118 (H) 65 - 100 mg/dL    BUN 13 6 - 20 MG/DL    Creatinine 1.23 0.70 - 1.30 MG/DL    BUN/Creatinine ratio 11 (L) 12 - 20      GFR est AA >60 >60 ml/min/1.73m2    GFR est non-AA 58 (L) >60 ml/min/1.73m2    Calcium 8.5 8.5 - 10.1 MG/DL    Bilirubin, total 0.5 0.2 - 1.0 MG/DL    ALT (SGPT) 22 12 - 78 U/L    AST (SGOT) 19 15 - 37 U/L    Alk. phosphatase 56 45 - 117 U/L    Protein, total 6.2 (L) 6.4 - 8.2 g/dL    Albumin 3.3 (L) 3.5 - 5.0 g/dL    Globulin 2.9 2.0 - 4.0 g/dL    A-G Ratio 1.1 1.1 - 2.2     CK W/ REFLX CKMB    Collection Time: 09/16/19  7:09 PM   Result Value Ref Range     39 - 308 U/L   MAGNESIUM    Collection Time: 09/16/19  7:09 PM   Result Value Ref Range    Magnesium 2.1 1.6 - 2.4 mg/dL   NT-PRO BNP    Collection Time: 09/16/19  7:09 PM   Result Value Ref Range    NT pro- (H) 0 - 125 PG/ML   TROPONIN I    Collection Time: 09/16/19  7:09 PM   Result Value Ref Range    Troponin-I, Qt. <0.05 <0.05 ng/mL   URINALYSIS W/MICROSCOPIC    Collection Time: 09/16/19  7:45 PM   Result Value Ref Range    Color YELLOW/STRAW      Appearance HAZY (A) CLEAR      Specific gravity 1.010 1.003 - 1.030      pH (UA) 6.0 5.0 - 8.0      Protein NEGATIVE  NEG mg/dL    Glucose NEGATIVE  NEG mg/dL    Ketone NEGATIVE  NEG mg/dL    Bilirubin NEGATIVE  NEG      Blood TRACE (A) NEG      Urobilinogen 1.0 0.2 - 1.0 EU/dL    Nitrites NEGATIVE  NEG      Leukocyte Esterase MODERATE (A) NEG      WBC 20-50 0 - 4 /hpf    RBC 0-5 0 - 5 /hpf    Epithelial cells FEW FEW /lpf    Bacteria 1+ (A) NEG /hpf    Mucus TRACE (A) NEG /lpf   URINE CULTURE HOLD SAMPLE    Collection Time: 09/16/19  7:45 PM   Result Value Ref Range    Urine culture hold        URINE ON HOLD IN MICROBIOLOGY DEPT FOR 3 DAYS. IF UNPRESERVED URINE IS SUBMITTED, IT CANNOT BE USED FOR ADDITIONAL TESTING AFTER 24 HRS, RECOLLECTION WILL BE REQUIRED. Xr Chest Pa Lat    Result Date: 9/16/2019  Exam:  2 view chest Indication: Fatigue, shortness of breath Comparison to 3/27/2018.  PA and lateral views demonstrate normal heart size. Pacer leads are unchanged in position. There is no acute process in the lung fields. Old right clavicle fracture is noted. Impression: No acute process or change compared to the prior exam.     Ct Head Wo Cont    Result Date: 9/16/2019  EXAM: CT HEAD WO CONT INDICATION: increased confusion COMPARISON: 7/10/2017. CONTRAST: None. TECHNIQUE: Unenhanced CT of the head was performed using 5 mm images. Brain and bone windows were generated. CT dose reduction was achieved through use of a standardized protocol tailored for this examination and automatic exposure control for dose modulation. Adaptive statistical iterative reconstruction (ASIR) was utilized. FINDINGS: The ventricles and sulci are normal in size, shape and configuration and midline. There is no significant white matter disease. There is no intracranial hemorrhage, extra-axial collection, mass, mass effect or midline shift. The basilar cisterns are open. No acute infarct is identified. The bone windows demonstrate no abnormalities. The visualized portions of the paranasal sinuses and mastoid air cells are clear. IMPRESSION: No acute process or change compared to the prior exam.        Patient Education        Urinary Tract Infections in Men: Care Instructions  Your Care Instructions    A urinary tract infection, or UTI, is a general term for an infection anywhere between the kidneys and the tip of the penis. UTIs can also be a result of a prostate problem. Most cause pain or burning when you urinate. Most UTIs are caused by bacteria and can be cured with antibiotics. It is important to complete your treatment so that the infection does not get worse. Follow-up care is a key part of your treatment and safety. Be sure to make and go to all appointments, and call your doctor if you are having problems. It's also a good idea to know your test results and keep a list of the medicines you take.   How can you care for yourself at home? · Take your antibiotics as prescribed. Do not stop taking them just because you feel better. You need to take the full course of antibiotics. · Take your medicines exactly as prescribed. Your doctor may have prescribed a medicine, such as phenazopyridine (Pyridium), to help relieve pain when you urinate. This turns your urine orange. You may stop taking it when your symptoms get better. But be sure to take all of your antibiotics, which treat the infection. · Drink extra water for the next day or two. This will help make the urine less concentrated and help wash out the bacteria causing the infection. (If you have kidney, heart, or liver disease and have to limit your fluids, talk with your doctor before you increase your fluid intake.)  · Avoid drinks that are carbonated or have caffeine. They can irritate the bladder. · Urinate often. Try to empty your bladder each time. · To relieve pain, take a hot bath or lay a heating pad (set on low) over your lower belly or genital area. Never go to sleep with a heating pad in place. To help prevent UTIs  · Drink plenty of fluids, enough so that your urine is light yellow or clear like water. If you have kidney, heart, or liver disease and have to limit fluids, talk with your doctor before you increase the amount of fluids you drink. · Urinate when you have the urge. Do not hold your urine for a long time. Urinate before you go to sleep. · Keep your penis clean. Catheter care  If you have a drainage tube (catheter) in place, the following steps will help you care for it. · Always wash your hands before and after touching your catheter. · Check the area around the urethra for inflammation or signs of infection. Signs of infection include irritated, swollen, red, or tender skin, or pus around the catheter. · Clean the area around the catheter with soap and water two times a day. Dry with a clean towel afterward.   · Do not apply powder or lotion to the skin around the catheter. To empty the urine collection bag  · Wash your hands with soap and water. · Without touching the drain spout, remove the spout from its sleeve at the bottom of the collection bag. Open the valve on the spout. · Let the urine flow out of the bag and into the toilet or a container. Do not let the tubing or drain spout touch anything. · After you empty the bag, clean the end of the drain spout with tissue and water. Close the valve and put the drain spout back into its sleeve at the bottom of the collection bag. · Wash your hands with soap and water. When should you call for help? Call your doctor now or seek immediate medical care if:    · Symptoms such as a fever, chills, nausea, or vomiting get worse or happen for the first time.     · You have new pain in your back just below your rib cage. This is called flank pain.     · There is new blood or pus in your urine.     · You are not able to take or keep down your antibiotics.    Watch closely for changes in your health, and be sure to contact your doctor if:    · You are not getting better after taking an antibiotic for 2 days.     · Your symptoms go away but then come back. Where can you learn more? Go to http://mackenzie-priscilla.info/. Enter E940 in the search box to learn more about \"Urinary Tract Infections in Men: Care Instructions. \"  Current as of: December 19, 2018  Content Version: 12.1  © 9819-2666 [employer]. Care instructions adapted under license by [employer] (which disclaims liability or warranty for this information). If you have questions about a medical condition or this instruction, always ask your healthcare professional. [place] [address] any warranty or liability for your use of this information. Patient Education        Fatigue: Care Instructions  Your Care Instructions    Fatigue is a feeling of tiredness, exhaustion, or lack of energy.  You may feel fatigue because of too much or not enough activity. It can also come from stress, lack of sleep, boredom, and poor diet. Many medical problems, such as viral infections, can cause fatigue. Emotional problems, especially depression, are often the cause of fatigue. Fatigue is most often a symptom of another problem. Treatment for fatigue depends on the cause. For example, if you have fatigue because you have a certain health problem, treating this problem also treats your fatigue. If depression or anxiety is the cause, treatment may help. Follow-up care is a key part of your treatment and safety. Be sure to make and go to all appointments, and call your doctor if you are having problems. It's also a good idea to know your test results and keep a list of the medicines you take. How can you care for yourself at home? · Get regular exercise. But don't overdo it. Go back and forth between rest and exercise. · Get plenty of rest.  · Eat a healthy diet. Do not skip meals, especially breakfast.  · Reduce your use of caffeine, tobacco, and alcohol. Caffeine is most often found in coffee, tea, cola drinks, and chocolate. · Limit medicines that can cause fatigue. This includes tranquilizers and cold and allergy medicines. When should you call for help? Watch closely for changes in your health, and be sure to contact your doctor if:    · You have new symptoms such as fever or a rash.     · Your fatigue gets worse.     · You have been feeling down, depressed, or hopeless. Or you may have lost interest in things that you usually enjoy.     · You are not getting better as expected. Where can you learn more? Go to http://mackenzie-priscilla.info/. Enter N832 in the search box to learn more about \"Fatigue: Care Instructions. \"  Current as of: September 23, 2018  Content Version: 12.1  © 2492-2594 [employer], [employer].  Care instructions adapted under license by [employer] (which disclaims liability or warranty for this information). If you have questions about a medical condition or this instruction, always ask your healthcare professional. [address] any warranty or liability for your use of this information.

## 2019-09-17 NOTE — ED PROVIDER NOTES
Is a 70-year-old male comes emergency room with chief complaint of fatigue and increased confusion. Patient and family states that he was in a golf tournament this past Wednesday. Patient became overheated a couple times and had to be taken to the clubhouse. Family states that since this time he has been increasingly more confused. Patient does have a history of mild dementia, but family states that this is worse than his normal baseline. Patient also has had been a little short of breath with exertion. Patient denies any pain. Patient denies any nausea or vomiting. The history is provided by the patient and a relative. No  was used. Altered mental status    This is a new problem. The current episode started more than 2 days ago. The problem has been gradually worsening. Associated symptoms include confusion. Pertinent negatives include no somnolence, no seizures, no unresponsiveness, no weakness, no agitation, no delusions, no hallucinations, no self-injury, no violence, no tingling and no numbness. Mental status baseline is mild dementia. Risk factors include dementia. His past medical history is significant for TIA, hypertension and dementia. His past medical history does not include seizures or head trauma. Past Medical History:   Diagnosis Date    Anxiety     Aortic insufficiency regurgitation 11/9/2010    Arthritis     neck    Atrial flutter (HCC)     cardioversion 5/18/11    BPH (benign prostatic hyperplasia)     Hypothyroid     Migraines     mobitz 1, atrioventricular block 11/9/2010       Past Surgical History:   Procedure Laterality Date    ECHO 2D ADULT  9/2010    normal LV wall motion and ejection fraction, left ventricular enlargement, left atrial enlargement, severe aortic regurgitation and mild mitral regurgitation. The ejection fraction was 55-60%.      ECHO 2D ADULT  2/2011    mild LVE, EF 58%, LAE, large Ao root, Severe AI, 2+MR    ECHO 2D ADULT 6/2011    LVH, EF 60%, trace -mild MR    HX CATARACT REMOVAL      RIGHT EYE    HX GI      inginal hernia repair 2008    HX HEART CATHETERIZATION  1987    normal coronary arteries normal left ventricular function.  HX HEART CATHETERIZATION  2011    Normal coronaries    HX HEART VALVE SURGERY  4/2011    AVR, #[address] Epic porcine    HX HERNIA REPAIR      HX PACEMAKER  1987    VVI, after stopping \"voltarin\" his block resolved.  Subsequently battery depleted in 2002 and was not replaced         Family History:   Problem Relation Age of Onset    Alcohol abuse Mother     Lung Disease Mother         EMPHYSEMA    Cancer Mother         LUNG CA    Stroke Mother     Heart Disease Father         MI       Social History     Socioeconomic History    Marital status:      Spouse name: Not on file    Number of children: Not on file    Years of education: Not on file    Highest education level: Not on file   Occupational History    Not on file   Social Needs    Financial resource strain: Not on file    Food insecurity:     Worry: Not on file     Inability: Not on file    Transportation needs:     Medical: Not on file     Non-medical: Not on file   Tobacco Use    Smoking status: Never Smoker    Smokeless tobacco: Never Used   Substance and Sexual Activity    Alcohol use: No     Frequency: Never     Comment: OCCASIONAL ETOH    Drug use: No     Types: Prescription    Sexual activity: Not Currently   Lifestyle    Physical activity:     Days per week: Not on file     Minutes per session: Not on file    Stress: Not on file   Relationships    Social connections:     Talks on phone: Not on file     Gets together: Not on file     Attends [employer] service: Not on file     Active member of club or organization: Not on file     Attends meetings of clubs or organizations: Not on file     Relationship status: Not on file    Intimate partner violence:     Fear of current or ex partner: Not on file Emotionally abused: Not on file     Physically abused: Not on file     Forced sexual activity: Not on file   Other Topics Concern     Service Not Asked    Blood Transfusions Not Asked    Caffeine Concern Not Asked    Occupational Exposure Not Asked    Hobby Hazards Not Asked    Sleep Concern Not Asked    Stress Concern Not Asked    Weight Concern Not Asked    Special Diet Not Asked    Back Care Not Asked    Exercise Not Asked    Bike Helmet Not Asked   [address] Not Asked    Self-Exams Not Asked   Social History Narrative    Not on file     ALLERGIES: Gantrisin    Review of Systems   Constitutional: Positive for fatigue. Negative for appetite change, chills, fever and unexpected weight change. HENT: Negative for ear pain, hearing loss, rhinorrhea and trouble swallowing. Eyes: Negative for pain and visual disturbance. Respiratory: Positive for shortness of breath. Negative for cough and chest tightness. Cardiovascular: Negative for chest pain and palpitations. Gastrointestinal: Negative for abdominal distention, abdominal pain, blood in stool and vomiting. Genitourinary: Negative for dysuria, hematuria and urgency. Musculoskeletal: Negative for back pain and myalgias. Skin: Negative for rash. Neurological: Negative for dizziness, tingling, seizures, syncope, weakness and numbness. Psychiatric/Behavioral: Positive for confusion. Negative for agitation, hallucinations, self-injury and suicidal ideas. All other systems reviewed and are negative. Vitals:    09/16/19 1909 09/16/19 1930 09/16/19 1950 09/16/19 2000   BP:   133/84 132/89   Pulse: 89  76 92   Resp: 19 19 (!) 7   Temp:       SpO2: 95% 99% 96% 96%            Physical Exam   Constitutional: He is oriented to person, place, and time. He appears well-developed and well-nourished. No distress. HENT:   Head: Normocephalic and atraumatic.    Right Ear: External ear normal.   Left Ear: External ear normal.   Nose: Nose normal.   Mouth/Throat: Oropharynx is clear and moist. No oropharyngeal exudate. Eyes: Pupils are equal, round, and reactive to light. Conjunctivae and EOM are normal. Right eye exhibits no discharge. Left eye exhibits no discharge. No scleral icterus. Neck: Normal range of motion. Neck supple. No JVD present. No tracheal deviation present. Cardiovascular: Normal rate, regular rhythm, normal heart sounds and intact distal pulses. Exam reveals no gallop and no friction rub. No murmur heard. Pulmonary/Chest: Effort normal and breath sounds normal. No stridor. No respiratory distress. He has no decreased breath sounds. He has no wheezes. He has no rhonchi. He has no rales. He exhibits no tenderness. Abdominal: Soft. Bowel sounds are normal. He exhibits no distension. There is no tenderness. There is no rebound and no guarding. Musculoskeletal: Normal range of motion. He exhibits no edema or tenderness. Neurological: He is alert and oriented to person, place, and time. He has normal strength and normal reflexes. He displays normal reflexes. No cranial nerve deficit or sensory deficit. He exhibits normal muscle tone. Coordination normal. GCS eye subscore is 4. GCS verbal subscore is 5. GCS motor subscore is 6. There are no focal deficits. There is some delay in answering of questions. However the patient is alert and oriented to person place and time. Skin: Skin is warm and dry. Capillary refill takes less than 2 seconds. No rash noted. He is not diaphoretic. No erythema. No pallor. Psychiatric: He has a normal mood and affect. His behavior is normal. Judgment and thought content normal.   Nursing note and vitals reviewed.        MDM  Number of Diagnoses or Management Options  Acute cystitis without hematuria:   Fatigue, unspecified type:      Amount and/or Complexity of Data Reviewed  Clinical lab tests: ordered and reviewed  Tests in the radiology section of CPT®: ordered and reviewed  Tests in the medicine section of CPT®: ordered and reviewed  Independent visualization of images, tracings, or specimens: yes (EKG)    Risk of Complications, Morbidity, and/or Mortality  Presenting problems: moderate  Diagnostic procedures: moderate  Management options: moderate    Patient Progress  Patient progress: stable       Procedures    Chief Complaint   Patient presents with    Shortness of Breath    Altered mental status       The patient's presenting problems have been discussed, and they are in agreement with the care plan formulated and outlined with them. I have encouraged them to ask questions as they arise throughout their visit. MEDICATIONS GIVEN:  Medications   sodium chloride (NS) flush 5-40 mL (has no administration in time range)   sodium chloride (NS) flush 5-40 mL (has no administration in time range)   sodium chloride 0.9 % bolus infusion 500 mL (500 mL IntraVENous New Bag 9/16/19 1944)   cefTRIAXone (ROCEPHIN) 1 g in sterile water (preservative free) 10 mL IV syringe (1 g IntraVENous Given 9/16/19 2017)       LABS REVIEWED:  Recent Results (from the past 24 hour(s))   EKG, 12 LEAD, INITIAL    Collection Time: 09/16/19  7:02 PM   Result Value Ref Range    Ventricular Rate 92 BPM    Atrial Rate 92 BPM    QRS Duration 104 ms    Q-T Interval 362 ms    QTC Calculation (Bezet) 447 ms    Calculated R Axis -50 degrees    Calculated T Axis 74 degrees    Diagnosis       Accelerated Junctional rhythm  Left anterior fascicular block  Moderate voltage criteria for LVH, may be normal variant  Cannot rule out Septal infarct (cited on or before 16-SEP-2019)  Abnormal ECG  When compared with ECG of 03-APR-2013 23:50,  Junctional rhythm has replaced Sinus rhythm  Vent. rate has increased BY  38 BPM  Questionable change in initial forces of Septal leads  QT has lengthened     CBC WITH AUTOMATED DIFF    Collection Time: 09/16/19  7:09 PM   Result Value Ref Range    WBC 10.5 4.1 - 11.1 K/uL    RBC 5.30 4. 10 - 5.70 M/uL    HGB 15.7 12.1 - 17.0 g/dL    HCT 45.6 36.6 - 50.3 %    MCV 86.0 80.0 - 99.0 FL    MCH 29.6 26.0 - 34.0 PG    MCHC 34.4 30.0 - 36.5 g/dL    RDW 12.4 11.5 - 14.5 %    PLATELET 502 905 - 002 K/uL    MPV 10.4 8.9 - 12.9 FL    NEUTROPHILS 80 (H) 32 - 75 %    LYMPHOCYTES 12 12 - 49 %    MONOCYTES 7 5 - 13 %    EOSINOPHILS 1 0 - 7 %    BASOPHILS 0 0 - 1 %    ABS. NEUTROPHILS 8.5 (H) 1.8 - 8.0 K/UL    ABS. LYMPHOCYTES 1.2 K/UL    ABS. MONOCYTES 0.7 0.0 - 1.0 K/UL    ABS. EOSINOPHILS 0.1 0.0 - 0.4 K/UL    ABS. BASOPHILS 0.0 0.0 - 0.1 K/UL    DF AUTOMATED     METABOLIC PANEL, COMPREHENSIVE    Collection Time: 09/16/19  7:09 PM   Result Value Ref Range    Sodium 141 136 - 145 mmol/L    Potassium 3.7 3.5 - 5.1 mmol/L    Chloride 105 97 - 108 mmol/L    CO2 29 21 - 32 mmol/L    Anion gap 7 5 - 15 mmol/L    Glucose 118 (H) 65 - 100 mg/dL    BUN 13 6 - 20 MG/DL    Creatinine 1.23 0.70 - 1.30 MG/DL    BUN/Creatinine ratio 11 (L) 12 - 20      GFR est AA >60 >60 ml/min/1.73m2    GFR est non-AA 58 (L) >60 ml/min/1.73m2    Calcium 8.5 8.5 - 10.1 MG/DL    Bilirubin, total 0.5 0.2 - 1.0 MG/DL    ALT (SGPT) 22 12 - 78 U/L    AST (SGOT) 19 15 - 37 U/L    Alk.  phosphatase 56 45 - 117 U/L    Protein, total 6.2 (L) 6.4 - 8.2 g/dL    Albumin 3.3 (L) 3.5 - 5.0 g/dL    Globulin 2.9 2.0 - 4.0 g/dL    A-G Ratio 1.1 1.1 - 2.2     CK W/ REFLX CKMB    Collection Time: 09/16/19  7:09 PM   Result Value Ref Range     39 - 308 U/L   MAGNESIUM    Collection Time: 09/16/19  7:09 PM   Result Value Ref Range    Magnesium 2.1 1.6 - 2.4 mg/dL   NT-PRO BNP    Collection Time: 09/16/19  7:09 PM   Result Value Ref Range    NT pro- (H) 0 - 125 PG/ML   TROPONIN I    Collection Time: 09/16/19  7:09 PM   Result Value Ref Range    Troponin-I, Qt. <0.05 <0.05 ng/mL   URINALYSIS W/MICROSCOPIC    Collection Time: 09/16/19  7:45 PM   Result Value Ref Range    Color YELLOW/STRAW      Appearance HAZY (A) CLEAR      Specific gravity 1.010 1.003 - 1.030 pH (UA) 6.0 5.0 - 8.0      Protein NEGATIVE  NEG mg/dL    Glucose NEGATIVE  NEG mg/dL    Ketone NEGATIVE  NEG mg/dL    Bilirubin NEGATIVE  NEG      Blood TRACE (A) NEG      Urobilinogen 1.0 0.2 - 1.0 EU/dL    Nitrites NEGATIVE  NEG      Leukocyte Esterase MODERATE (A) NEG      WBC 20-50 0 - 4 /hpf    RBC 0-5 0 - 5 /hpf    Epithelial cells FEW FEW /lpf    Bacteria 1+ (A) NEG /hpf    Mucus TRACE (A) NEG /lpf   URINE CULTURE HOLD SAMPLE    Collection Time: 09/16/19  7:45 PM   Result Value Ref Range    Urine culture hold        URINE ON HOLD IN MICROBIOLOGY DEPT FOR 3 DAYS. IF UNPRESERVED URINE IS SUBMITTED, IT CANNOT BE USED FOR ADDITIONAL TESTING AFTER 24 HRS, RECOLLECTION WILL BE REQUIRED. VITAL SIGNS:  Patient Vitals for the past 24 hrs:   Temp Pulse Resp BP SpO2   09/16/19 2000  92 (!) 7 132/89 96 %   09/16/19 1950  76 19 133/84 96 %   09/16/19 1930     99 %   09/16/19 1909  89 19  95 %   09/16/19 1908  91 17 127/82    09/16/19 1856 98.1 °F (36.7 °C) 78 16 127/81 95 %       RADIOLOGY RESULTS:  The following have been ordered and reviewed:  Xr Chest Pa Lat    Result Date: 9/16/2019  Exam:  2 view chest Indication: Fatigue, shortness of breath Comparison to 3/27/2018. PA and lateral views demonstrate normal heart size. Pacer leads are unchanged in position. There is no acute process in the lung fields. Old right clavicle fracture is noted. Impression: No acute process or change compared to the prior exam.     Ct Head Wo Cont    Result Date: 9/16/2019  EXAM: CT HEAD WO CONT INDICATION: increased confusion COMPARISON: 7/10/2017. CONTRAST: None. TECHNIQUE: Unenhanced CT of the head was performed using 5 mm images. Brain and bone windows were generated. CT dose reduction was achieved through use of a standardized protocol tailored for this examination and automatic exposure control for dose modulation. Adaptive statistical iterative reconstruction (ASIR) was utilized.  FINDINGS: The ventricles and sulci are normal in size, shape and configuration and midline. There is no significant white matter disease. There is no intracranial hemorrhage, extra-axial collection, mass, mass effect or midline shift. The basilar cisterns are open. No acute infarct is identified. The bone windows demonstrate no abnormalities. The visualized portions of the paranasal sinuses and mastoid air cells are clear. IMPRESSION: No acute process or change compared to the prior exam.      ED EKG interpretation:  Rhythm: Accelerated junctional, left anterior fascicular block; and regular . Rate (approx.): 92; Axis: left axis deviation; QRS interval: normal ; ST/T wave: normal; Other findings: Abnormal EKG, LVH. This EKG was interpreted by Yaneli Rodriguez DO, ED Provider. PROGRESS NOTES:  Discussed results and plan with patient and family. Patient is found to have UTI which could explain his symptoms. Rest of his labs are unremarkable. Patient is due to have his pacemaker interrogated by Dr. Dante Henderson. Patient will be discharged home with PCP and cardiology follow up. Patient instructed to return to the emergency room for any worsening symptoms or any other concerns. DIAGNOSIS:    1. Acute cystitis without hematuria    2.  Fatigue, unspecified type        PLAN:  Follow-up Information     Follow up With Specialties Details Why Contact Info    Melanie Ogden MD Family Practice Schedule an appointment as soon as possible for a visit  [address] [place] [address]  [phone]      Paul Terrell MD Cardiology Schedule an appointment as soon as possible for a visit For pacer check [facility]  Cardiology of [address] [facility] [address] [phone] [address] [facility] DEPT Emergency Medicine  If symptoms worsen [address]  [phone]        Current Discharge Medication List      START taking these medications    Details   cephALEXin (KEFLEX) 500 mg capsule Take 1 Cap by mouth three (3) times daily for 7 days. Qty: 21 Cap, Refills: 0         CONTINUE these medications which have NOT CHANGED    Details   fludrocortisone (FLORINEF) 0.1 mg tablet Take 0.1 mg by mouth daily. finasteride (PROSCAR) 5 mg tablet Take 5 mg by mouth daily. melatonin 3 mg tablet Take  by mouth nightly. FLUoxetine (PROZAC) 10 mg capsule Take 10 mg by mouth daily. Refills: 3      carbidopa-levodopa (SINEMET)  mg per tablet Take 25 Tabs by mouth three (3) times daily. Refills: 2      tamsulosin (FLOMAX) 0.4 mg capsule Take 0.4 mg by mouth daily. Refills: 3      donepezil (ARICEPT) 5 mg tablet Take  by mouth nightly. aspirin delayed-release 81 mg tablet Take 325 mg by mouth daily. levothyroxine (SYNTHROID) 125 mcg tablet Take  by mouth Daily (before breakfast). ED COURSE: The patient's hospital course has been uncomplicated.

## 2019-09-17 NOTE — ED NOTES
Pt discharged to home. Plan of care reviewed with patient and family by Provider, verbalized understanding. Medication and consults reviewed, verbalized understanding. NAD.

## 2019-09-19 LAB
BACTERIA SPEC CULT: ABNORMAL
CC UR VC: ABNORMAL
SERVICE CMNT-IMP: ABNORMAL

## 2019-09-21 NOTE — PROGRESS NOTES
Attempted to reach patient.  2nd Message left for call back concerning culture result and need for treatment change

## 2019-09-21 NOTE — PROGRESS NOTES
Pt spouse returned call, pt has followed-up with urologist and had antibiotic changed and new culture obtained

## 2020-02-19 ENCOUNTER — OFFICE VISIT (OUTPATIENT)
Dept: NEUROLOGY | Age: 70
End: 2020-02-19

## 2020-02-19 VITALS
HEART RATE: 64 BPM | OXYGEN SATURATION: 97 % | HEIGHT: 72 IN | SYSTOLIC BLOOD PRESSURE: 110 MMHG | DIASTOLIC BLOOD PRESSURE: 78 MMHG | WEIGHT: 180 LBS | BODY MASS INDEX: 24.38 KG/M2

## 2020-02-19 DIAGNOSIS — R25.1 TREMOR: ICD-10-CM

## 2020-02-19 DIAGNOSIS — R41.3 MEMORY LOSS: ICD-10-CM

## 2020-02-19 DIAGNOSIS — R25.1 TREMOR: Primary | ICD-10-CM

## 2020-02-19 RX ORDER — FLUOXETINE 10 MG/1
TABLET ORAL
COMMUNITY
Start: 2020-02-04

## 2020-02-19 RX ORDER — DONEPEZIL HYDROCHLORIDE 10 MG/1
TABLET, FILM COATED ORAL
COMMUNITY
Start: 2020-02-12

## 2020-02-19 RX ORDER — LEVOTHYROXINE SODIUM 112 UG/1
TABLET ORAL
COMMUNITY
Start: 2020-01-28

## 2020-02-19 RX ORDER — CARBIDOPA AND LEVODOPA 25; 100 MG/1; MG/1
1 TABLET ORAL 3 TIMES DAILY
Qty: 90 TAB | Refills: 5 | Status: SHIPPED | OUTPATIENT
Start: 2020-02-19 | End: 2020-07-13

## 2020-02-19 NOTE — PROGRESS NOTES
Earl Tariq is a 68 y.o. male who presents with the following  Chief Complaint   Patient presents with    Follow-up    Alzheimers    Tremors    Neck Pain       HPI      Patient comes in with wife for a follow up for dementia and new onset of tremors. He states tremors got worse after a potential heat stroke in end of September. He was on Sinemet at one time and thinks this was helping but taken off because he wanted to try to come off medications. He has noticed and increase in tremor specifically on the left side and LUE is resting and action. He does have some action tremor in the RUE also. He has not fallen but is very unsteady, unbalanced, and has trouble initiating steps. Posture of stooped. He does have underlying dementia. Is to see [facility] movement in May but looking for relief sooner. Muscle weakness in all four extremities also. He is to re-test memory in May. He is wanting to work on balance with PT. Not had any recent testing. Allergies   Allergen Reactions    Gantrisin Rash       Current Outpatient Medications   Medication Sig    donepeziL (ARICEPT) 10 mg tablet TAKE 1 TABLET BY MOUTH EVERYDAY AT BEDTIME    FLUoxetine (PROZAC) 10 mg tablet TAKE 1 TABLET BY MOUTH EVERY DAY    levothyroxine (SYNTHROID) 112 mcg tablet     carbidopa-levodopa (SINEMET)  mg per tablet Take 1 Tab by mouth three (3) times daily.  finasteride (PROSCAR) 5 mg tablet Take 5 mg by mouth daily.  melatonin 3 mg tablet Take  by mouth nightly.  tamsulosin (FLOMAX) 0.4 mg capsule Take 0.4 mg by mouth daily.  fludrocortisone (FLORINEF) 0.1 mg tablet Take 0.1 mg by mouth daily.  aspirin delayed-release 81 mg tablet Take 325 mg by mouth daily. No current facility-administered medications for this visit.         Social History     Tobacco Use   Smoking Status Never Smoker   Smokeless Tobacco Never Used       Past Medical History:   Diagnosis Date    Anxiety     Aortic insufficiency regurgitation 11/9/2010    Arthritis     neck    Atrial flutter (HCC)     cardioversion 5/18/11    BPH (benign prostatic hyperplasia)     Hypothyroid     Migraines     mobitz 1, atrioventricular block 11/9/2010       Past Surgical History:   Procedure Laterality Date    ECHO 2D ADULT  9/2010    normal LV wall motion and ejection fraction, left ventricular enlargement, left atrial enlargement, severe aortic regurgitation and mild mitral regurgitation. The ejection fraction was 55-60%.  ECHO 2D ADULT  2/2011    mild LVE, EF 58%, LAE, large Ao root, Severe AI, 2+MR    ECHO 2D ADULT  6/2011    LVH, EF 60%, trace -mild MR    HX CATARACT REMOVAL      RIGHT EYE    HX GI      inginal hernia repair 2008    HX HEART CATHETERIZATION  1987    normal coronary arteries normal left ventricular function.  HX HEART CATHETERIZATION  2011    Normal coronaries    HX HEART VALVE SURGERY  4/2011    AVR, #[address] [place] Epic porcine    HX HERNIA REPAIR      HX PACEMAKER  1987    VVI, after stopping \"voltarin\" his block resolved. Subsequently battery depleted in 2002 and was not replaced       Family History   Problem Relation Age of Onset    Alcohol abuse Mother     Lung Disease Mother         EMPHYSEMA    Cancer Mother         LUNG CA    Stroke Mother     Heart Disease Father         MI       Social History     Socioeconomic History    Marital status:      Spouse name: Not on file    Number of children: Not on file    Years of education: Not on file    Highest education level: Not on file   Tobacco Use    Smoking status: Never Smoker    Smokeless tobacco: Never Used   Substance and Sexual Activity    Alcohol use: No     Frequency: Never     Comment: OCCASIONAL ETOH    Drug use: No     Types: Prescription    Sexual activity: Not Currently   Other Topics Concern       Review of Systems   Constitutional: Positive for malaise/fatigue.    Eyes: Negative for blurred vision, double vision and photophobia. Gastrointestinal: Negative for nausea and vomiting. Musculoskeletal: Positive for neck pain. Negative for back pain, falls and joint pain. Neurological: Positive for tingling, tremors, sensory change and weakness. Negative for dizziness, seizures, loss of consciousness and headaches. Psychiatric/Behavioral: Positive for memory loss. Negative for hallucinations. The patient is not nervous/anxious and does not have insomnia. Remainder of comprehensive review is negative. Physical Exam :    Visit Vitals  /78   Pulse 64   Ht 6' (1.829 m)   Wt 81.6 kg (180 lb)   SpO2 97%   BMI 24.41 kg/m²       General: Well defined, nourished, and groomed individual in no acute distress.    Neck: Supple, nontender, no bruits, no pain with resistance to active range of motion.    Musculoskeletal: Extremities revealed no edema and had full range of motion of joints.    Psych: Good mood and bright affect    NEUROLOGICAL EXAMINATION:    Mental Status: Alert and oriented to person, place, and time    Cranial Nerves:    II, III, IV, VI: Visual acuity grossly intact. Visual fields are normal.    Pupils are equal, round, and reactive to light and accommodation.    Extra-ocular movements are full and fluid. Fundoscopic exam was benign, no ptosis or nystagmus.    V-XII: Hearing is grossly intact. Facial features are symmetric, with normal sensation and strength. The palate rises symmetrically and the tongue protrudes midline. Sternocleidomastoids 5/5. Motor Examination: Normal tone, bulk, and strength, 3/5 muscle strength throughout. Coordination: moderate LUE resting tremor. Gait and Station: stooped posture, 5 step turn around. No arm swing. Reflexes: DTRs 1+ throughout. Neurosensory Exam:  Stocking glove sensory loss to temperature, vibration, and pinprick to the thighs.         Results for orders placed or performed during the hospital encounter of 09/16/19   URINE CULTURE HOLD SAMPLE   Result Value Ref Range    Urine culture hold        URINE ON HOLD IN MICROBIOLOGY DEPT FOR 3 DAYS. IF UNPRESERVED URINE IS SUBMITTED, IT CANNOT BE USED FOR ADDITIONAL TESTING AFTER 24 HRS, RECOLLECTION WILL BE REQUIRED. CULTURE, URINE   Result Value Ref Range    Special Requests: NO SPECIAL REQUESTS      [place] Count >100,000  COLONIES/mL        Culture result: ENTEROCOCCUS FAECALIS GROUP D (A)         Susceptibility    Enterococcus  faecalis group D - STEVE     Ampicillin ($) <=2 Susceptible ug/mL     Daptomycin ($$$$$) 1 Susceptible ug/mL     Linezolid ($$$$$) 2 Susceptible ug/mL     Nitrofurantoin <=32 Susceptible ug/mL     Penicillin G ($$) 2 Susceptible ug/mL     Vancomycin ($) 2 Susceptible ug/mL     Levofloxacin ($) <=1 Susceptible ug/mL     Ciprofloxacin ($) <=1 Susceptible ug/mL     Tetracycline >8 Resistant ug/mL   CBC WITH AUTOMATED DIFF   Result Value Ref Range    WBC 10.5 4.1 - 11.1 K/uL    RBC 5.30 4. 10 - 5.70 M/uL    HGB 15.7 12.1 - 17.0 g/dL    HCT 45.6 36.6 - 50.3 %    MCV 86.0 80.0 - 99.0 FL    MCH 29.6 26.0 - 34.0 PG    MCHC 34.4 30.0 - 36.5 g/dL    RDW 12.4 11.5 - 14.5 %    PLATELET 514 609 - 235 K/uL    MPV 10.4 8.9 - 12.9 FL    NEUTROPHILS 80 (H) 32 - 75 %    LYMPHOCYTES 12 12 - 49 %    MONOCYTES 7 5 - 13 %    EOSINOPHILS 1 0 - 7 %    BASOPHILS 0 0 - 1 %    ABS. NEUTROPHILS 8.5 (H) 1.8 - 8.0 K/UL    ABS. LYMPHOCYTES 1.2 K/UL    ABS. MONOCYTES 0.7 0.0 - 1.0 K/UL    ABS. EOSINOPHILS 0.1 0.0 - 0.4 K/UL    ABS.  BASOPHILS 0.0 0.0 - 0.1 K/UL    DF AUTOMATED     METABOLIC PANEL, COMPREHENSIVE   Result Value Ref Range    Sodium 141 136 - 145 mmol/L    Potassium 3.7 3.5 - 5.1 mmol/L    Chloride 105 97 - 108 mmol/L    CO2 29 21 - 32 mmol/L    Anion gap 7 5 - 15 mmol/L    Glucose 118 (H) 65 - 100 mg/dL    BUN 13 6 - 20 MG/DL    Creatinine 1.23 0.70 - 1.30 MG/DL    BUN/Creatinine ratio 11 (L) 12 - 20      GFR est AA >60 >60 ml/min/1.73m2    GFR est non-AA 58 (L) >60 ml/min/1.73m2    Calcium 8.5 8.5 - 10.1 MG/DL    Bilirubin, total 0.5 0.2 - 1.0 MG/DL    ALT (SGPT) 22 12 - 78 U/L    AST (SGOT) 19 15 - 37 U/L    Alk. phosphatase 56 45 - 117 U/L    Protein, total 6.2 (L) 6.4 - 8.2 g/dL    Albumin 3.3 (L) 3.5 - 5.0 g/dL    Globulin 2.9 2.0 - 4.0 g/dL    A-G Ratio 1.1 1.1 - 2.2     CK W/ REFLX CKMB   Result Value Ref Range     39 - 308 U/L   URINALYSIS W/MICROSCOPIC   Result Value Ref Range    Color YELLOW/STRAW      Appearance HAZY (A) CLEAR      Specific gravity 1.010 1.003 - 1.030      pH (UA) 6.0 5.0 - 8.0      Protein NEGATIVE  NEG mg/dL    Glucose NEGATIVE  NEG mg/dL    Ketone NEGATIVE  NEG mg/dL    Bilirubin NEGATIVE  NEG      Blood TRACE (A) NEG      Urobilinogen 1.0 0.2 - 1.0 EU/dL    Nitrites NEGATIVE  NEG      Leukocyte Esterase MODERATE (A) NEG      WBC 20-50 0 - 4 /hpf    RBC 0-5 0 - 5 /hpf    Epithelial cells FEW FEW /lpf    Bacteria 1+ (A) NEG /hpf    Mucus TRACE (A) NEG /lpf   MAGNESIUM   Result Value Ref Range    Magnesium 2.1 1.6 - 2.4 mg/dL   NT-PRO BNP   Result Value Ref Range    NT pro- (H) 0 - 125 PG/ML   TROPONIN I   Result Value Ref Range    Troponin-I, Qt. <0.05 <0.05 ng/mL   EKG, 12 LEAD, INITIAL   Result Value Ref Range    Ventricular Rate 92 BPM    Atrial Rate 92 BPM    QRS Duration 104 ms    Q-T Interval 362 ms    QTC Calculation (Bezet) 447 ms    Calculated R Axis -50 degrees    Calculated T Axis 74 degrees    Diagnosis       Accelerated Junctional rhythm  Left anterior fascicular block  Moderate voltage criteria for LVH, may be normal variant  Cannot rule out Septal infarct (cited on or before 16-SEP-2019)  Abnormal ECG  When compared with ECG of 03-APR-2013 23:50,  Junctional rhythm has replaced Sinus rhythm  Vent.  rate has increased BY  38 BPM  Questionable change in initial forces of Septal leads  QT has lengthened  Confirmed by Og Bone MD., Megan ([id number]) on 9/17/2019 6:05:35 PM         Orders Placed This Encounter    REFERRAL TO PHYSICAL THERAPY     Referral Priority:   Routine     Referral Type:   PT/OT/ST     Referral Reason:   Specialty Services Required    EEG     Standing Status:   Future     Standing Expiration Date:   8/19/2020     Order Specific Question:   Reason for Exam:     Answer:   tremor    EMG LIMITED     Standing Status:   Future     Standing Expiration Date:   8/19/2020     Order Specific Question:   Reason for Exam:     Answer:   left sided tremor, weakness    DUPLEX CAROTID BILATERAL     Standing Status:   Future     Number of Occurrences:   1     Standing Expiration Date:   8/19/2020    donepeziL (ARICEPT) 10 mg tablet     Sig: TAKE 1 TABLET BY MOUTH EVERYDAY AT BEDTIME    FLUoxetine (PROZAC) 10 mg tablet     Sig: TAKE 1 TABLET BY MOUTH EVERY DAY    levothyroxine (SYNTHROID) 112 mcg tablet    carbidopa-levodopa (SINEMET)  mg per tablet     Sig: Take 1 Tab by mouth three (3) times daily. Dispense:  90 Tab     Refill:  5       1. Tremor    2. Memory loss          Worsening parkinson tremor. Re-start Sinemet at TID. EMG left side to look at causes of muscle weakness and full body tremor. EEG to look at epilepsy for memory loss and doppler to look at stenosis. Had a normal CT in September in ER. Pacemaker prohibits MRI   Refer to [facility] program to help with PT and strength.            This note will not be viewable in [employer]

## 2020-02-19 NOTE — PROGRESS NOTES
Pt is here today for a f/u. Pt is here today c/o tremors in his left arm/ leg for the last 2-3 months. Pt is c/o stiff neck.

## 2020-02-20 ENCOUNTER — HOSPITAL ENCOUNTER (OUTPATIENT)
Dept: NEUROLOGY | Age: 70
Discharge: HOME | End: 2020-02-20
Attending: NURSE PRACTITIONER
Payer: MEDICARE

## 2020-02-20 DIAGNOSIS — R25.1 TREMOR: ICD-10-CM

## 2020-02-20 DIAGNOSIS — R41.3 MEMORY LOSS: ICD-10-CM

## 2020-02-20 PROCEDURE — 95816 EEG AWAKE AND DROWSY: CPT

## 2020-02-20 NOTE — PROCEDURES
Jerry Cao [facility]      Electroencephalogram    Procedure ID: [id number] Procedure Date: 02/20/2020   Patient Name: Romana Skelton YOB: 1950   Procedure Type: Routine Medical Record No: [id number]     INDICATION: tremors, dementia    Medications:  Current Outpatient Medications   Medication Sig    donepeziL (ARICEPT) 10 mg tablet TAKE 1 TABLET BY MOUTH EVERYDAY AT BEDTIME    FLUoxetine (PROZAC) 10 mg tablet TAKE 1 TABLET BY MOUTH EVERY DAY    levothyroxine (SYNTHROID) 112 mcg tablet     carbidopa-levodopa (SINEMET)  mg per tablet Take 1 Tab by mouth three (3) times daily.  fludrocortisone (FLORINEF) 0.1 mg tablet Take 0.1 mg by mouth daily.  finasteride (PROSCAR) 5 mg tablet Take 5 mg by mouth daily.  melatonin 3 mg tablet Take  by mouth nightly.  tamsulosin (FLOMAX) 0.4 mg capsule Take 0.4 mg by mouth daily.  aspirin delayed-release 81 mg tablet Take 325 mg by mouth daily. No current facility-administered medications for this encounter. DESCRIPTION OF PROCEDURE: Electrodes were applied in accordance with the international 10-20 system of electrode placement. EEG was reviewed in both bipolar and referential montages    Description of Activity:  During wakefulness, there is continuous runs of 8 Hz symmetric low voltage posterior alpha rhythm, that attenuate symmetrically with eye opening. Low voltage beta activity occurs symmetrically at the anterior head regions bilaterally. During drowsiness, there is attenuation of the alpha rhythm and low voltage theta activity occurs bilaterally. Intermittent photic stimulation was performed and did not induce posterior driving responses. No sharp or spike discharges, seizures or epileptiform discharges seen. No focal asymmetry. Clinical Interpretation: This EEG, performed during wakefulness and drowsiness is normal. There is no focal asymmetry, seizures or epileptiform discharges seen.

## 2020-02-25 ENCOUNTER — OFFICE VISIT (OUTPATIENT)
Dept: NEUROLOGY | Age: 70
End: 2020-02-25

## 2020-02-25 DIAGNOSIS — R53.1 WEAKNESS GENERALIZED: Primary | ICD-10-CM

## 2020-02-25 NOTE — PROGRESS NOTES
This was an elective EMG and nerve conduction of the patient's left upper and lower extremities by request.  Current concerns go to a Parkinson type overlay and generalized weakness issues. Patient history. As noted patient says about a year and a half ago started with concerning symptoms of memory performance and orientation and has tremors superimposed left side greater than right. Has background dementia in terms of the previously referenced memory issues and no history of falls. Patient exam.  Alert cooperative slightly hypophonic speech but lucid. Very cordial. Cranial nerves II through XII normal.  Cerebellar testing finger-nose-finger toe to finger on target but slow. Motor 5-/5 in a generalized sense. Sensibility grossly intact. Reflexes +2, and gait with stooped shouldered posturing and again tremors that during the exam were most noteworthy in the left upper extremity and right lower extremity. EMG nerve conduction findings. 1.  Needle insertion and probing was normal.  Sometimes superimposed tremor action and was able to settle this in all instances. No evidence of acute denervation chronic denervation/reinnervation or myopathic potentials. Patient had no difficulty with tolerability and motor unit recruitment as to number morphology and time sequencing was all appropriate. 2.  The nerve conduction portion showed unobtainable left superior peroneal sensory and sural sensory results. There was some ulnar motor nerve conduction velocity discrepancies around the left elbow, but downstream motor unit morphology and amplitude was preserved. Coincident median ulnar palmar sensory comparison test was normal.  Subtle delay in the left tibial F wave of undetermined, if any significance. A very subtle and insignificant delay in the left ulnar F wave latency. Impression:  This study showed inability to capture left lower extremity sensory results, but in an older individual, may not be of pathologic significance anyway. Clinical correlation is advised.   ELIZABETH TY.

## 2020-02-25 NOTE — PROGRESS NOTES
EMG/ NCS Report  [facility]  [address] [facility], [address] [place] [address] [address], [address]   Ph: [phone]   FAX: [phone]/ [phone]  Test Date:  2020    Patient: Franck Way : 1950 Physician: Nelly Marcos, Nicole Wheatley MD   Sex: Male Height: ' \" Ref Koby Grandchild   ID#: [id number] Weight:  lbs. Technician: Shiela Caballero     Patient History / Exam:  CC:LT. SIDED WEAKNESS,TREMOR          EMG & NCV Findings:  Evaluation of the left Fibular motor nerve showed normal distal onset latency (5.7 ms), normal amplitude (1.4 mV), normal conduction velocity (B Fib-Ankle, 39 m/s), and normal conduction velocity (Poplt-B Fib, 53 m/s). The left median motor nerve showed normal distal onset latency (3.8 ms), normal amplitude (7.1 mV), and normal conduction velocity (Elbow-Wrist, 49 m/s). The left tibial motor nerve showed normal distal onset latency (5.9 ms), normal amplitude (3.3 mV), and normal conduction velocity (Knee-Ankle, 41 m/s). The left ulnar motor nerve showed normal distal onset latency (3.1 ms), normal amplitude (12.7 mV), normal conduction velocity (B Elbow-Wrist, 52 m/s), and normal conduction velocity (A Elbow-B Elbow, 53 m/s). The left median sensory, the left radial sensory, and the left ulnar sensory nerves showed normal distal peak latency (L3.6, L2.6, L4.0 ms) and normal amplitude (L29.8, L40.1, L27.2 µV). The left Sup Fibular sensory nerve showed no response (Lower leg), no response (Site 2), and no response (Site 3). The left sural sensory nerve showed no response (Calf). The left median/ulnar (palm) comparison nerve showed normal distal onset latency (Median Palm, 1.9 ms), prolonged distal peak latency (Median Palm, 2.3 ms), normal amplitude (Median Palm, 46.7 µV), normal distal onset latency (Ulnar Palm, 1.7 ms), normal distal peak latency (Ulnar Palm, 2.2 ms), and normal amplitude (Ulnar Palm, 26.1 µV).       F Wave studies indicate that the left tibial F wave has prolonged latency (59.23 ms). The left ulnar F wave has prolonged latency (32.31 ms). All examined muscles (as indicated in the following table) showed no evidence of electrical instability.         Impression:        ___________________________  Shoaib Bruno IV, MD      Nerve Conduction Studies  Anti Sensory Summary Table     Stim Site NR Peak (ms) Norm Peak (ms) P-T Amp (µV) Norm P-T Amp Site1 Site2 Dist (cm)   Left Median Anti Sensory (2nd Digit)  32.7°C   Wrist    3.6 <4 29.8 >13 Wrist 2nd Digit 14.0   Elbow    3.7  29.8  Elbow Wrist 0.0   Left Radial Anti Sensory (Base 1st Digit)  31°C   Wrist    2.6 <2.8 40.1 >11 Wrist Base 1st Digit 10.0   Site 2    2.6  33.2       Left Sup Fibular Anti Sensory (Lat ankle)  27.6°C   Lower leg NR  <4.6  >4 Lower leg Lat ankle 10.0   Site 2 NR          Site 3 NR          Left Sural Anti Sensory (Lat Mall)  28.2°C   Calf NR  <4.5  >4.0 Calf Lat Mall 14.0   Left Ulnar Anti Sensory (5th Digit)  31.6°C   Wrist    4.0 <4.0 27.2 >9 Wrist 5th Digit 14.0   B Elbow    4.0  35.0  B Elbow Wrist 0.0     Motor Summary Table     Stim Site NR Onset (ms) Norm Onset (ms) O-P Amp (mV) Norm O-P Amp Amp (Prev) (%) Site1 Site2 Dist (cm) Gopi (m/s) Norm Gopi (m/s)   Left Fibular Motor (Ext Dig Brev)  27.1°C   Ankle    5.7 <6.5 1.4 >1.1 100.0 Ankle Ext Dig Brev 8.0     B Fib    13.6  1.4  100.0 B Fib Ankle 31.0 39 >38   Poplt    15.5  1.6  114.3 Poplt B Fib 10.0 53 >42   Left Median Motor (Abd Poll Brev)  30.3°C   Wrist    3.8 <4.5 7.1 >4.1 100.0 Wrist Abd Poll Brev 8.0     Elbow    7.9  6.8  95.8 Elbow Wrist 20.0 49 >49   Left Tibial Motor (Abd Tompkins Brev)  27.1°C   Ankle    5.9 <6.1 3.3 >1.1 100.0 Ankle Abd Tompkins Brev 8.0     Knee    14.5  1.6  48.5 Knee Ankle 35.0 41 >39   Left Ulnar Motor (Abd Dig Minimi)  27.1°C   Wrist    3.1 <3.1 12.7 >7.0 100.0 Wrist Abd Dig Minimi 8.0  >50   B Elbow    7.7  11.6  91.3 B Elbow Wrist 24.0 52 >50   A Elbow    9.6 10.0  86.2 A Elbow B Elbow 10.0 53 >50     Comparison Summary Table     Stim Site NR Peak (ms) P-T Amp (µV) Site1 Site2 Dist (cm) Delta-0 (ms)   Left Median/Ulnar Palm Comparison (Wrist)  29.6°C   Median Palm    2.3 79.4 Median Palm Ulnar Palm 8.0 0.2   Ulnar Palm    2.2 21.3         F Wave Studies     NR F-Lat (ms) Lat Norm (ms) L-R F-Lat (ms) L-R Lat Norm   Left Tibial (Mrkrs) (Abd Hallucis)  27.1°C      59.23 <56  <5.7   Left Ulnar (Mrkrs) (Abd Dig Min)  26.8°C      32.31 <32  <2.5     H Reflex Studies     NR H-Lat (ms) L-R H-Lat (ms) L-R Lat Norm   Left Tibial (Gastroc)  27°C      40.00  <2.0     EMG     Side Muscle Nerve Root Ins Act Fibs Psw Recrt Duration Amp Poly Comment   Left Ext Dig Brev Dp Br Peron L5, S1 Nml Nml Nml Nml Nml Nml Nml    Left AntTibialis Dp Br Peron L4-5 Nml Nml Nml Nml Nml Nml Nml    Left MedGastroc Tibial S1-2 Nml Nml Nml Nml Nml Nml Nml    Left VastusMed Femoral L2-4 Nml Nml Nml Nml Nml Nml Nml    Left BicepsFemL Sciatic L5-S2 Nml Nml Nml Nml Nml Nml Nml    Left Abd Poll Brev Median C8-T1 Nml Nml Nml Nml Nml Nml Nml    Left BrachioRad Radial C5-6 Nml Nml Nml Nml Nml Nml Nml    Left Biceps Musculocut C5-6 Nml Nml Nml Nml Nml Nml Nml    Left Triceps Radial C6-7-8 Nml Nml Nml Nml Nml Nml Nml    Left Deltoid Axillary C5-6 Nml Nml Nml Nml Nml Nml Nml                Nerve Conduction Studies  Anti Sensory Left/Right Comparison     Stim Site L Lat (ms) R Lat (ms) L-R Lat (ms) L Amp (µV) R Amp (µV) L-R Amp (%) Site1 Site2 L Gopi (m/s) R Gopi (m/s) L-R Gopi (m/s)   Median Anti Sensory (2nd Digit)  32.7°C   Wrist 2.7   29.8   Wrist 2nd Digit 52     Elbow 2.8   29.8   Elbow Wrist      Radial Anti Sensory (Base 1st Digit)  31°C   Wrist 1.8   40.1   Wrist Base 1st Digit 56     Site 2 1.8   33.2          Sup Fibular Anti Sensory (Lat ankle)  27.6°C   Lower leg       Lower leg Lat ankle      Site 2              Site 3              Sural Anti Sensory (Lat Mall)  28.2°C   Calf       Calf Lat Mall      Ulnar Anti Sensory (5th Digit)  31.6°C   Wrist 3.3   27.2   Wrist 5th Digit 42     B Elbow 3.3   35.0   B Elbow Wrist        Motor Left/Right Comparison     Stim Site L Lat (ms) R Lat (ms) L-R Lat (ms) L Amp (mV) R Amp (mV) L-R Amp (%) Site1 Site2 L Gopi (m/s) R Gopi (m/s) L-R Gopi (m/s)   Fibular Motor (Ext Dig Brev)  27.1°C   Ankle 5.7   1.4   Ankle Ext Dig Brev      B Fib 13.6   1.4   B Fib Ankle 39     Poplt 15.5   1.6   Poplt B Fib 53     Median Motor (Abd Poll Brev)  30.3°C   Wrist 3.8   7.1   Wrist Abd Poll Brev      Elbow 7.9   6.8   Elbow Wrist 49     Tibial Motor (Abd Tompkins Brev)  27.1°C   Ankle 5.9   3.3   Ankle Abd Tompkins Brev      Knee 14.5   1.6   Knee Ankle 41     Ulnar Motor (Abd Dig Minimi)  27.1°C   Wrist 3.1   12.7   Wrist Abd Dig Minimi      B Elbow 7.7   11.6   B Elbow Wrist 52     A Elbow 9.6   10.0   A Elbow B Elbow 53       Comparison Left/Right Comparison     Stim Site L Lat (ms) R Lat (ms) L-R Lat (ms) L Amp (µV) R Amp (µV) L-R Amp (%)   Median/Ulnar Palm Comparison (Wrist)  29.6°C   Median Palm 1.9   46.7     Ulnar Palm 1.7   26.1           Waveforms:

## 2020-03-25 ENCOUNTER — TELEPHONE (OUTPATIENT)
Dept: NEUROLOGY | Age: 70
End: 2020-03-25

## 2020-03-25 NOTE — TELEPHONE ENCOUNTER
----- Message from Sophia Fernandez sent at 3/25/2020  9:32 AM EDT -----  Regarding: Dr. Carlita Philip first and last name:OMAR THORNTON [facility])      Reason for call: Requesting a call back in regards to pt's medications       Callback required yes/no and why:yes      Best contact number(s):[phone]      Details to clarify the request:      Sophia Fernandez

## 2020-04-03 ENCOUNTER — VIRTUAL VISIT (OUTPATIENT)
Dept: NEUROLOGY | Age: 70
End: 2020-04-03

## 2020-04-03 VITALS — BODY MASS INDEX: 24.41 KG/M2 | HEIGHT: 72 IN

## 2020-04-03 DIAGNOSIS — R25.1 TREMOR: Primary | ICD-10-CM

## 2020-04-03 NOTE — PROGRESS NOTES
Consent: Maral Lockwood, who was seen by synchronous (real-time) audio-video technology, and/or his healthcare decision maker, is aware that this patient-initiated, Telehealth encounter on 4/3/2020 is a billable service, with coverage as determined by his insurance carrier. He is aware that he may receive a bill and has provided verbal consent to proceed: Yes. Discussion via virtual on test results of EEG and EMG. No changes in symptoms. Feeling like things got worse on the Sinemet. Still has appt. With [facility] in May. On Aricept 10 mg. Staying active day to day with exercising, stretching, balance. No falls. Eating and sleeping well. Assessment & Plan:   Diagnoses and all orders for this visit:    1. Tremor                I spent at least 15 minutes with this established patient, and >50% of the time was spent counseling and/or coordinating care regarding medication, treatment, results. 712  Subjective:   Maral Lockwood is a 68 y.o. male who was seen for Follow-up      Prior to Admission medications    Medication Sig Start Date End Date Taking? Authorizing Provider   donepeziL (ARICEPT) 10 mg tablet TAKE 1 TABLET BY MOUTH EVERYDAY AT BEDTIME 2/12/20  Yes Provider, Historical   FLUoxetine (PROZAC) 10 mg tablet TAKE 1 TABLET BY MOUTH EVERY DAY 2/4/20  Yes Provider, Historical   levothyroxine (SYNTHROID) 112 mcg tablet  1/28/20  Yes Provider, Historical   carbidopa-levodopa (SINEMET)  mg per tablet Take 1 Tab by mouth three (3) times daily. 2/19/20  Yes Paris Medrano NP   fludrocortisone (FLORINEF) 0.1 mg tablet Take 0.1 mg by mouth daily. Yes Other, MD Kinsey   finasteride (PROSCAR) 5 mg tablet Take 5 mg by mouth daily. Yes Other, MD Kinsey   melatonin 3 mg tablet Take  by mouth nightly. Yes Other, MD Kinsey   tamsulosin (FLOMAX) 0.4 mg capsule Take 0.4 mg by mouth daily. 2/9/19  Yes Provider, Historical   aspirin delayed-release 81 mg tablet Take 325 mg by mouth daily. Yes Provider, Historical     Allergies   Allergen Reactions    Gantrisin Rash       Patient Active Problem List   Diagnosis Code    mobitz 1, atrioventricular block I44.1    Atrial flutter (HCC) I48.92    S/P aortic valve replacement Z95.2    TIA (transient ischemic attack) G45.9    Hypothyroidism E03.9    BPH (benign prostatic hyperplasia) N40.0    Visual disturbances H53.9    Other specified visual disturbances H53.8    Migraine aura without headache G43.109     Patient Active Problem List    Diagnosis Date Noted    Other specified visual disturbances 05/16/2014    Migraine aura without headache 05/16/2014    Visual disturbances 04/28/2014    TIA (transient ischemic attack) 04/03/2013    Hypothyroidism 04/03/2013    BPH (benign prostatic hyperplasia) 04/03/2013    Atrial flutter ([address] 05/16/2011    S/P aortic valve replacement 05/16/2011    mobitz 1, atrioventricular block 11/09/2010     Current Outpatient Medications   Medication Sig Dispense Refill    donepeziL (ARICEPT) 10 mg tablet TAKE 1 TABLET BY MOUTH EVERYDAY AT BEDTIME      FLUoxetine (PROZAC) 10 mg tablet TAKE 1 TABLET BY MOUTH EVERY DAY      levothyroxine (SYNTHROID) 112 mcg tablet       carbidopa-levodopa (SINEMET)  mg per tablet Take 1 Tab by mouth three (3) times daily. 90 Tab 5    fludrocortisone (FLORINEF) 0.1 mg tablet Take 0.1 mg by mouth daily.  finasteride (PROSCAR) 5 mg tablet Take 5 mg by mouth daily.  melatonin 3 mg tablet Take  by mouth nightly.  tamsulosin (FLOMAX) 0.4 mg capsule Take 0.4 mg by mouth daily. 3    aspirin delayed-release 81 mg tablet Take 325 mg by mouth daily.        Allergies   Allergen Reactions    Gantrisin Rash     Past Medical History:   Diagnosis Date    Anxiety     Aortic insufficiency regurgitation 11/9/2010    Arthritis     neck    Atrial flutter (HCC)     cardioversion 5/18/11    BPH (benign prostatic hyperplasia)     Hypothyroid     Migraines     mobitz 1, atrioventricular block 11/9/2010     Past Surgical History:   Procedure Laterality Date    ECHO 2D ADULT  9/2010    normal LV wall motion and ejection fraction, left ventricular enlargement, left atrial enlargement, severe aortic regurgitation and mild mitral regurgitation. The ejection fraction was 55-60%.  ECHO 2D ADULT  2/2011    mild LVE, EF 58%, LAE, large Ao root, Severe AI, 2+MR    ECHO 2D ADULT  6/2011    LVH, EF 60%, trace -mild MR    HX CATARACT REMOVAL      RIGHT EYE    HX GI      inginal hernia repair 2008    HX HEART CATHETERIZATION  1987    normal coronary arteries normal left ventricular function.  HX HEART CATHETERIZATION  2011    Normal coronaries    HX HEART VALVE SURGERY  4/2011    AVR, #[address] Epic porcine    HX HERNIA REPAIR      HX PACEMAKER  1987    VVI, after stopping \"voltarin\" his block resolved.  Subsequently battery depleted in 2002 and was not replaced     Family History   Problem Relation Age of Onset    Alcohol abuse Mother     Lung Disease Mother         EMPHYSEMA    Cancer Mother         LUNG CA    Stroke Mother     Heart Disease Father         MI     Social History     Tobacco Use    Smoking status: Never Smoker    Smokeless tobacco: Never Used   Substance Use Topics    Alcohol use: No     Frequency: Never     Comment: OCCASIONAL ETOH       ROS        Objective:   Vital Signs: (As obtained by patient/caregiver at home)  Visit Vitals  Ht 6' (1.829 m)   BMI 24.41 kg/m²        [INSTRUCTIONS:  \"[x]\" Indicates a positive item  \"[]\" Indicates a negative item  -- DELETE ALL ITEMS NOT EXAMINED]    Constitutional: [x] Appears well-developed and well-nourished [x] No apparent distress      [] Abnormal -     Mental status: [x] Alert and awake  [x] Oriented to person/place/time [x] Able to follow commands    [] Abnormal -     Eyes:   EOM    [x]  Normal    [] Abnormal -   Sclera  [x]  Normal    [] Abnormal -          Discharge [x]  None visible   [] Abnormal - HENT: [x] Normocephalic, atraumatic  [] Abnormal -   [x] Mouth/Throat: Mucous membranes are moist    External Ears [x] Normal  [] Abnormal -    Neck: [x] No visualized mass [] Abnormal -     Pulmonary/Chest: [x] Respiratory effort normal   [x] No visualized signs of difficulty breathing or respiratory distress        [] Abnormal -      Musculoskeletal:   [x] Normal gait with no signs of ataxia         [x] Normal range of motion of neck        [] Abnormal -     Neurological:        [x] No Facial Asymmetry (Cranial nerve 7 motor function) (limited exam due to video visit)          [x] No gaze palsy        [] Abnormal -          Skin:        [x] No significant exanthematous lesions or discoloration noted on facial skin         [] Abnormal -            Psychiatric:       [x] Normal Affect [] Abnormal -        [x] No Hallucinations    Other pertinent observable physical exam findings:-        EEG and EMG discussed in full. Sinemet made things worse. Continue to stay active and do therapy exercises. Will wait until [facility] can further evaluate. We discussed the expected course, resolution and complications of the diagnosis(es) in detail. Medication risks, benefits, costs, interactions, and alternatives were discussed as indicated. I advised him to contact the office if his condition worsens, changes or fails to improve as anticipated. He expressed understanding with the diagnosis(es) and plan. Lois Jeronimo is a 68 y.o. male being evaluated by a video visit encounter for concerns as above. A caregiver was present when appropriate. Due to this being a TeleHealth encounter (During FPX-29 public health emergency), evaluation of the following organ systems was limited: Vitals/Constitutional/EENT/Resp/CV/GI//MS/Neuro/Skin/Heme-Lymph-Imm.   Pursuant to the emergency declaration under the [facility]5 waiver authority and the [employer] and [employer] Supplemental Appropriations Act, this Virtual  Visit was conducted, with patient's (and/or legal guardian's) consent, to reduce the patient's risk of exposure to COVID-19 and provide necessary medical care. Services were provided through a video synchronous discussion virtually to substitute for in-person clinic visit. Patient and provider were located at their individual homes.         Chery Mariee NP

## 2020-04-30 ENCOUNTER — VIRTUAL VISIT (OUTPATIENT)
Dept: NEUROLOGY | Age: 70
End: 2020-04-30

## 2020-04-30 DIAGNOSIS — G30.0 EARLY ONSET ALZHEIMER'S DEMENTIA WITHOUT BEHAVIORAL DISTURBANCE (HCC): Primary | ICD-10-CM

## 2020-04-30 DIAGNOSIS — F02.80 EARLY ONSET ALZHEIMER'S DEMENTIA WITHOUT BEHAVIORAL DISTURBANCE (HCC): Primary | ICD-10-CM

## 2020-04-30 DIAGNOSIS — F43.21 ADJUSTMENT DISORDER WITH DEPRESSED MOOD: ICD-10-CM

## 2020-04-30 NOTE — PROGRESS NOTES
[address] [facility],[address]  [address]   [address] [place] [address] [facility] francis    [phone] Office   [phone] Fax      Neuropsychology      Exam # 2    Initial Diagnostic Interview Note      Referral:  Matthew Gutierrez MD    Lucy Galvez is a 68 y.o. right handed   male who was accompanied by his spouse to the initial clinical interview on 4/30/20. Please refer to his medical records for details pertaining to his history. At the start of the appointment, I reviewed the patient's [facility] Epic Chart (including Media scanned in from previous providers) for the active Problem List, all pertinent Past Medical Hx, medications, recent radiologic and laboratory findings. In addition, I reviewed pt's documented Immunization Record and Encounter History. Pursuant to the emergency declaration under the [address] [place] [address], [facility] waiver authority and the [employer] Act, this Virtual  Visit (audiovisual) was conducted, with appropriate consent obtained, to reduce the patient's risk of exposure to COVID-19 and provide continuity of care   Services were provided in this manner to substitute for in-person clinic visit. The originating site is the patient's home and the distance site is [facility] at the [place] [address]. These types of teleneuropsychology/telehealth/telemedicine visits were authorized by the President of the [place], though I/we cannot guarantee what a third party payor will do reimbursement/coverage wise. I indicated that I would evaluate the patient and recommend diagnostics and treatment based on my assessment and impressions, and that our sessions are not being recorded and that personal health information is protected to the best of our abilities.         When I saw him last:      Tennis Needs is a 77 y.o. right handed   male who was accompanied by his spouse to the initial clinical interview on 2/1/19 . Please refer to his medical records for details pertaining to his history. When I saw him last:     Afua Campbell a 69 y.o. right handed   male who was accompanied by his spouse to the initial clinical interview on 5/10/18. Oral Avila refer to his medical records for details pertaining to his history.  Briefly, the patient reported that he completed the 12th grade and when he was in school he failed 4th and 7th grade and had to repeat those. Ardeth Grade graduated with a standard diploma. Ardeth Grade is retired, and worked in [address] has had problems with progressive short term memory loss. Ardeth Grade also has migraines, tremor, visual problems, and mood changes. Cris Guillaume is worried about dementia. Connie Shah is no known history of dementia.  No background stroke, meningitis/encephalitis, JAMIE Fever, Lupus, Lyme, TBI, sz.  Wife says he does not remember driving routes as well as he did previously.  He can't concentrate.  Can't multitask.  Is more disorganized.  Loses words sometimes.  Has trouble remembering what he did earlier in the day or what he ate for breakfast. Things have gotten worse over the past few months. H He has a history of anxiety and depression and gets very anxious when he notices the memory problems.  Pain related to headaches is significant.  Gets worse - vision.  Resting tremor.  He takes his own medications and has no difficulties with his ADLs from a cognitive standpoint.  Family reports that his half brother has dementia.  Spouse notes that he loses things like his glasses.  He has two dogs with sound shocks and will lay the collars down and can't find them initially.  Misplaces things.  Anxiety worse and not benefiting from medication.  No legal issues currently.  Spouse corroborates.  Spouse does all the cooking.       No previous neuropsych.       Since I saw him last:  This patient generated a mixed normal/abnormal  range Neuropsychological Evaluation with respect to neurocognitive functioning.  In this regard, he is showing marked impairment with auditory learning and memory and mild impairments with visual attention and verbal fluency.  Otherwise, his confrontation naming, working memory, processing speed, perceptual reasoning, verbal comprehension, executive functioning, and bilateral fine motor dexterity were normal.  Vision issues and medication changes are not likely the basis for his generated combination of neurocognitive strengths and weaknesses.  From an emotional standpoint, there is support for both anxiety and depression.  The anxiety appears to be a combination of organic and functional factors and the depression appears mostly functional. [facility] has recently been started on a different medication for anxiety.                   In my opinion, this profile is consistent with an evolving organic process that is currently at a mild level of severity.  Mood issues exacerbate, but currently the profile is not purely consistent with pseudodementia.  Early stage AD is likely.  In addition to continued medical care, my recommendations include consideration for memory management medication. Consider also treatment for attention if not medically contraindicated (the problem is mild).  I also recommend continued treatment for anxiety and counseling to assist with adjustment related depression and self-esteem/self-concept issues.  .  The patient should be encouraged to remain as mentally, socially, and physically active as possible.                   The patient's generated cognitive difficulties are significant to the degree whereby it is my opinion that he should not live independently without appropriate supervision for those domains with a heavy memory emphasis.  This includes medication management supervision and supervision of financial dealings. I am not overly concerned about driving but using a GPS may be wise when he is driving alone.  The problem has been caught early on, and I hope he recognizes this as positive. I am not currently concerned about competency. Yovanyabhishek Oh now established.  Follow up one year or  prn. Negar Disla correlation is, of course, indicated.                 I will discuss these findings with the patient and family when they follow up with me in the near future.  A follow up Neuropsychological Evaluation is indicated on a prn basis.       DIAGNOSES: Dementia - Mild                          Anxiety - Mild to moderate                          Depression - Mild         I did not test him in 2019, because he seemed to be doing pretty stable. They are here now for six month re-evaluation from last time. He is on carvidopa now. His cognitive functioning seems to be doing pretty well, though he had some bouts of confusion and had a CT done below which was normal.  He has an appointment on Friday with movement disorders clinic. The patient does note that he loses track of what he's trying t say, No changes since I last saw him. He has not noticed any changes in his memory but losing words . Things are about the same. No problems with driving. Spouse assists with medications, finances, day-to-day chores, etc.  He continues to struggle with anxiety, and is on fluoxetine 10 mg for anxiety and depression. He gets anxious. No new acute or focal issues. No new medical issues. No new stroke, head injury, seizures, meningitis and such. He did have tremor and hand shaking worse. Came of the carvidopa now? much. No other major psychiatric concerns. No other major medical issues. Sounds like he is doing well cognitively, as can be expected, which is good news. Will follow up again in one year. Diagnostics since last seen include: The right CCA is patent. There is minimal stenosis in the right ICA.  There is intimal thickening present in the right ICA. The right ECA is patent. The right vertebral is antegrade. Left Carotid     The left CCA is patent. There is minimal stenosis in the left ICA. There is intimal thickening present in the left ICA. The left ECA is patent. The left vertebral is antegrade. EXAM: CT HEAD WO CONT     INDICATION: increased confusion     COMPARISON: 7/10/2017.     CONTRAST: None.     TECHNIQUE: Unenhanced CT of the head was performed using 5 mm images. Brain and  bone windows were generated. CT dose reduction was achieved through use of a  standardized protocol tailored for this examination and automatic exposure  control for dose modulation. Adaptive statistical iterative reconstruction  (ASIR) was utilized.     FINDINGS:  The ventricles and sulci are normal in size, shape and configuration and  midline. There is no significant white matter disease. There is no intracranial  hemorrhage, extra-axial collection, mass, mass effect or midline shift. The  basilar cisterns are open. No acute infarct is identified. The bone windows  demonstrate no abnormalities.  The visualized portions of the paranasal sinuses  and mastoid air cells are clear.     IMPRESSION  IMPRESSION: No acute process or change compared to the prior exam.    Sudhakar Acosta MD     2/20/2020  5:44 PM    [facility] [address]      Electroencephalogram    Procedure ID: [id number] Procedure Date: 02/20/2020   Patient Name: Lois Jeronimo YOB: 1950   Procedure Type: Routine Medical Record No: [id number]     INDICATION: tremors, dementia    Medications:  Current Outpatient Medications   Medication Sig    donepeziL (ARICEPT) 10 mg tablet TAKE 1 TABLET BY MOUTH   EVERYDAY AT BEDTIME    FLUoxetine (PROZAC) 10 mg tablet TAKE 1 TABLET BY MOUTH EVERY   DAY    levothyroxine (SYNTHROID) 112 mcg tablet     carbidopa-levodopa (SINEMET)  mg per tablet Take 1 Tab by   mouth three (3) times daily.  fludrocortisone (FLORINEF) 0.1 mg tablet Take 0.1 mg by mouth   daily.  finasteride (PROSCAR) 5 mg tablet Take 5 mg by mouth daily.  melatonin 3 mg tablet Take  by mouth nightly.  tamsulosin (FLOMAX) 0.4 mg capsule Take 0.4 mg by mouth daily.  aspirin delayed-release 81 mg tablet Take 325 mg by mouth   daily. No current facility-administered medications for this encounter.          DESCRIPTION OF PROCEDURE: Electrodes were applied in accordance   with the international 10-20 system of electrode placement.  EEG   was reviewed in both bipolar and referential montages    Description of Activity:  During wakefulness, there is continuous runs of 8 Hz symmetric   low voltage posterior alpha rhythm, that attenuate symmetrically   with eye opening. Low voltage beta activity occurs symmetrically   at the anterior head regions bilaterally. During drowsiness, there is attenuation of the alpha rhythm and   low voltage theta activity occurs bilaterally.      Intermittent photic stimulation was performed and did not induce   posterior driving responses. No sharp or spike discharges, seizures or epileptiform discharges   seen. No focal asymmetry. Clinical Interpretation: This EEG, performed during wakefulness and drowsiness is normal.   There is no focal asymmetry, seizures or epileptiform discharges   seen.              Neuropsychological Mental Status Exam (NMSE):      Historian: Good  Praxis: No UE apraxia  R/L Orientation: Intact to self and to other  Dress: within normal limits   Weight: within normal limits   Appearance/Hygiene: within normal limits   Gait: within normal limits   Assistive Devices: None  Mood: within normal limits   Affect: within normal limits   Comprehension: within normal limits   Thought Process: within normal limits   Expressive Language: Mild articulation problems    Receptive Language: within normal limits   Motor:  No cognitive perseveration.  Shaking on the left side. ETOH: Very rarely  Tobacco: Denied  Illicit: Denied  SI/HI: Denied  Psychosis: Denied  Insight: Within normal limits  Judgment: Within normal limits  Other Psych:      Past Medical History:   Diagnosis Date    Anxiety     Aortic insufficiency regurgitation 11/9/2010    Arthritis     neck    Atrial flutter (HCC)     cardioversion 5/18/11    BPH (benign prostatic hyperplasia)     Hypothyroid     Migraines     mobitz 1, atrioventricular block 11/9/2010       Past Surgical History:   Procedure Laterality Date    ECHO 2D ADULT  9/2010    normal LV wall motion and ejection fraction, left ventricular enlargement, left atrial enlargement, severe aortic regurgitation and mild mitral regurgitation. The ejection fraction was 55-60%.  ECHO 2D ADULT  2/2011    mild LVE, EF 58%, LAE, large Ao root, Severe AI, 2+MR    ECHO 2D ADULT  6/2011    LVH, EF 60%, trace -mild MR    HX CATARACT REMOVAL      RIGHT EYE    HX GI      inginal hernia repair 2008    HX HEART CATHETERIZATION  1987    normal coronary arteries normal left ventricular function.  HX HEART CATHETERIZATION  2011    Normal coronaries    HX HEART VALVE SURGERY  4/2011    AVR, #25 [address] Epic porcine    HX HERNIA REPAIR      HX PACEMAKER  1987    VVI, after stopping \"voltarin\" his block resolved.  Subsequently battery depleted in 2002 and was not replaced       Allergies   Allergen Reactions    Gantrisin Rash       Family History   Problem Relation Age of Onset    Alcohol abuse Mother     Lung Disease Mother         EMPHYSEMA    Cancer Mother         LUNG CA    Stroke Mother     Heart Disease Father         MI       Social History     Tobacco Use    Smoking status: Never Smoker    Smokeless tobacco: Never Used   Substance Use Topics    Alcohol use: No     Frequency: Never     Comment: OCCASIONAL ETOH    Drug use: No     Types: Prescription       Current Outpatient Medications   Medication Sig Dispense Refill    donepeziL (ARICEPT) 10 mg tablet TAKE 1 TABLET BY MOUTH EVERYDAY AT BEDTIME      FLUoxetine (PROZAC) 10 mg tablet TAKE 1 TABLET BY MOUTH EVERY DAY      levothyroxine (SYNTHROID) 112 mcg tablet       carbidopa-levodopa (SINEMET)  mg per tablet Take 1 Tab by mouth three (3) times daily. 90 Tab 5    fludrocortisone (FLORINEF) 0.1 mg tablet Take 0.1 mg by mouth daily.  finasteride (PROSCAR) 5 mg tablet Take 5 mg by mouth daily.  melatonin 3 mg tablet Take  by mouth nightly.  tamsulosin (FLOMAX) 0.4 mg capsule Take 0.4 mg by mouth daily. 3    aspirin delayed-release 81 mg tablet Take 325 mg by mouth daily. Plan:  Obtain authorization for testing from insurance company. Report to follow once testing, scoring, and interpretation completed. ? Organic based neurocognitive issues versus mood disorder or combination of same. ? Problems organic, functional, or both? This note will not be viewable in [address]

## 2020-07-13 RX ORDER — CARBIDOPA AND LEVODOPA 25; 100 MG/1; MG/1
TABLET ORAL
Qty: 270 TAB | Refills: 1 | Status: SHIPPED | OUTPATIENT
Start: 2020-07-13 | End: 2020-07-14 | Stop reason: SDUPTHER

## 2020-07-14 RX ORDER — CARBIDOPA AND LEVODOPA 25; 100 MG/1; MG/1
TABLET ORAL
Qty: 150 TAB | Refills: 5 | Status: SHIPPED | OUTPATIENT
Start: 2020-07-14 | End: 2021-01-28

## 2021-01-28 ENCOUNTER — OFFICE VISIT (OUTPATIENT)
Dept: NEUROLOGY | Age: 71
End: 2021-01-28
Payer: MEDICARE

## 2021-01-28 VITALS
BODY MASS INDEX: 24.68 KG/M2 | SYSTOLIC BLOOD PRESSURE: 126 MMHG | DIASTOLIC BLOOD PRESSURE: 72 MMHG | WEIGHT: 182 LBS | OXYGEN SATURATION: 98 % | HEART RATE: 80 BPM

## 2021-01-28 DIAGNOSIS — G20 PARKINSONISM, UNSPECIFIED PARKINSONISM TYPE (HCC): ICD-10-CM

## 2021-01-28 DIAGNOSIS — R53.1 WEAKNESS GENERALIZED: ICD-10-CM

## 2021-01-28 DIAGNOSIS — F02.80 ALZHEIMER'S DEMENTIA WITHOUT BEHAVIORAL DISTURBANCE, UNSPECIFIED TIMING OF DEMENTIA ONSET: Primary | ICD-10-CM

## 2021-01-28 DIAGNOSIS — G30.9 ALZHEIMER'S DEMENTIA WITHOUT BEHAVIORAL DISTURBANCE, UNSPECIFIED TIMING OF DEMENTIA ONSET: Primary | ICD-10-CM

## 2021-01-28 PROCEDURE — G8420 CALC BMI NORM PARAMETERS: HCPCS | Performed by: NURSE PRACTITIONER

## 2021-01-28 PROCEDURE — 99215 OFFICE O/P EST HI 40 MIN: CPT | Performed by: NURSE PRACTITIONER

## 2021-01-28 PROCEDURE — G8432 DEP SCR NOT DOC, RNG: HCPCS | Performed by: NURSE PRACTITIONER

## 2021-01-28 PROCEDURE — G8536 NO DOC ELDER MAL SCRN: HCPCS | Performed by: NURSE PRACTITIONER

## 2021-01-28 PROCEDURE — G8427 DOCREV CUR MEDS BY ELIG CLIN: HCPCS | Performed by: NURSE PRACTITIONER

## 2021-01-28 PROCEDURE — 1101F PT FALLS ASSESS-DOCD LE1/YR: CPT | Performed by: NURSE PRACTITIONER

## 2021-01-28 PROCEDURE — 3017F COLORECTAL CA SCREEN DOC REV: CPT | Performed by: NURSE PRACTITIONER

## 2021-01-28 RX ORDER — CARBIDOPA AND LEVODOPA 25; 100 MG/1; MG/1
TABLET ORAL
Qty: 120 TAB | Refills: 5 | Status: SHIPPED | OUTPATIENT
Start: 2021-01-28 | End: 2021-02-23

## 2021-01-29 NOTE — PROGRESS NOTES
Elizabeth Baig is a 65 y.o. male who presents with the following  Chief Complaint   Patient presents with    Follow-up    Tremors       HPI     FU with wife for worsening memory, tremors. Did get to [facility] and to try to decipher between true parkinson and dementia related parkinsonsim. No results. Back with significantly worse tremor in both UE and LE   He notices things are hard for him to do day to day   ADLS are tough. Wife can help but still having problems. He can not stop tremors. Nothing helps    Worse when anxious. He has fallen a few     His memory is also getting worse. [facility] said to come off Aricept and maybe that was making tremor worse. They have not seen any change. He is trying to stay busy but his tremor, muscle issues are causing significant interference. He has not wanted to get back to PT yet. They did notice off the Sinemet they feel like tremor has gotten worse. Allergies   Allergen Reactions    Gantrisin Rash       Current Outpatient Medications   Medication Sig    carbidopa-levodopa (Sinemet)  mg per tablet Take 1 tablet by mouth at 8 am, 12 noon, 4 pm, and 8 pm    donepeziL (ARICEPT) 10 mg tablet TAKE 1 TABLET BY MOUTH EVERYDAY AT BEDTIME    FLUoxetine (PROZAC) 10 mg tablet TAKE 1 TABLET BY MOUTH EVERY DAY    levothyroxine (SYNTHROID) 112 mcg tablet     fludrocortisone (FLORINEF) 0.1 mg tablet Take 0.1 mg by mouth daily.  finasteride (PROSCAR) 5 mg tablet Take 5 mg by mouth daily.  melatonin 3 mg tablet Take  by mouth nightly.  tamsulosin (FLOMAX) 0.4 mg capsule Take 0.4 mg by mouth two (2) times a day.  aspirin delayed-release 81 mg tablet Take 325 mg by mouth daily. No current facility-administered medications for this visit.         Social History     Tobacco Use   Smoking Status Never Smoker   Smokeless Tobacco Never Used       Past Medical History:   Diagnosis Date    Anxiety     Aortic insufficiency regurgitation 11/9/2010    Arthritis     neck    Atrial flutter (HCC)     cardioversion 5/18/11    BPH (benign prostatic hyperplasia)     Hypothyroid     Migraines     mobitz 1, atrioventricular block 11/9/2010       Past Surgical History:   Procedure Laterality Date    ECHO 2D ADULT  9/2010    normal LV wall motion and ejection fraction, left ventricular enlargement, left atrial enlargement, severe aortic regurgitation and mild mitral regurgitation. The ejection fraction was 55-60%.  ECHO 2D ADULT  2/2011    mild LVE, EF 58%, LAE, large Ao root, Severe AI, 2+MR    ECHO 2D ADULT  6/2011    LVH, EF 60%, trace -mild MR    HX CATARACT REMOVAL      RIGHT EYE    HX GI      inginal hernia repair 2008    HX HEART CATHETERIZATION  1987    normal coronary arteries normal left ventricular function.  HX HEART CATHETERIZATION  2011    Normal coronaries    HX HEART VALVE SURGERY  4/2011    AVR, #[address] [place] Epic porcine    HX HERNIA REPAIR      HX PACEMAKER  1987    VVI, after stopping \"voltarin\" his block resolved. Subsequently battery depleted in 2002 and was not replaced       Family History   Problem Relation Age of Onset    Alcohol abuse Mother     Lung Disease Mother         EMPHYSEMA    Cancer Mother         LUNG CA    Stroke Mother     Heart Disease Father         MI       Social History     Socioeconomic History    Marital status:      Spouse name: Not on file    Number of children: Not on file    Years of education: Not on file    Highest education level: Not on file   Tobacco Use    Smoking status: Never Smoker    Smokeless tobacco: Never Used   Substance and Sexual Activity    Alcohol use: No     Frequency: Never     Comment: OCCASIONAL ETOH    Drug use: No     Types: Prescription    Sexual activity: Not Currently   Other Topics Concern       Review of Systems   Eyes: Positive for blurred vision. Negative for double vision and photophobia. Respiratory: Negative for shortness of breath and wheezing. Cardiovascular: Negative for chest pain and palpitations. Gastrointestinal: Negative for nausea. Musculoskeletal: Positive for falls. Negative for joint pain. Neurological: Positive for dizziness, tingling, tremors, sensory change, speech change and weakness. Negative for focal weakness, seizures, loss of consciousness and headaches. Psychiatric/Behavioral: Positive for memory loss. Negative for depression, hallucinations, substance abuse and suicidal ideas. The patient is nervous/anxious. The patient does not have insomnia. Remainder of comprehensive review is negative. Physical Exam :    Visit Vitals  /72   Pulse 80   Wt 82.6 kg (182 lb)   SpO2 98%   BMI 24.68 kg/m²       General: Well defined, nourished, and groomed individual in no acute distress.    Neck: Supple, nontender, no bruits, no pain with resistance to active range of motion.    Heart: Regular rate and rhythm, no murmurs, rub, or gallop. Normal S1S2. Lungs: Clear to auscultation bilaterally with equal chest expansion, no cough, no wheeze  Musculoskeletal: Extremities revealed no edema and had full range of motion of joints.   moderate cogwheel bilaterally both wrists. Psych: expressive aphasia at times, stuttering, slur at times. NEUROLOGICAL EXAMINATION:    Mental Status: Alert and oriented to person, place, not time. Not fully assessed MMSE     Cranial Nerves:    II, III, IV, VI: Visual acuity grossly intact. Visual fields are normal.    Pupils are equal, round, and reactive to light and accommodation.    Extra-ocular movements are full and fluid. Fundoscopic exam was benign, no ptosis or nystagmus.    V-XII: Hearing is grossly intact. Facial features are symmetric, with normal sensation and strength. The palate rises symmetrically and the tongue protrudes midline. Sternocleidomastoids 5/5. Motor Examination: Normal tone, bulk, and strength, 3/5 muscle strength throughout.      Coordination: severe resting tremor in bilateral UE, moderate resting tremor in both legs. Gait and Station: Steady while walking. Normal arm swing. No pronator drift. No muscle wasting or fasiculations noted. Reflexes: DTRs 1+ throughout. Neurosensory Exam:  Stocking glove sensory loss to temperature, vibration, and pinprick to the thighs. Results for orders placed or performed during the hospital encounter of 09/16/19   URINE CULTURE HOLD SAMPLE    Specimen: Serum; Urine   Result Value Ref Range    Urine culture hold        URINE ON HOLD IN MICROBIOLOGY DEPT FOR 3 DAYS. IF UNPRESERVED URINE IS SUBMITTED, IT CANNOT BE USED FOR ADDITIONAL TESTING AFTER 24 HRS, RECOLLECTION WILL BE REQUIRED. CULTURE, URINE    Specimen: Clean catch; Urine   Result Value Ref Range    Special Requests: NO SPECIAL REQUESTS      [place] Count >100,000  COLONIES/mL        Culture result: ENTEROCOCCUS FAECALIS GROUP D (A)         Susceptibility    Enterococcus  faecalis group D - STEVE     Ampicillin ($) <=2 Susceptible ug/mL     Daptomycin ($$$$$) 1 Susceptible ug/mL     Linezolid ($$$$$) 2 Susceptible ug/mL     Nitrofurantoin <=32 Susceptible ug/mL     Penicillin G ($$) 2 Susceptible ug/mL     Vancomycin ($) 2 Susceptible ug/mL     Levofloxacin ($) <=1 Susceptible ug/mL     Ciprofloxacin ($) <=1 Susceptible ug/mL     Tetracycline >8 Resistant ug/mL   CBC WITH AUTOMATED DIFF   Result Value Ref Range    WBC 10.5 4.1 - 11.1 K/uL    RBC 5.30 4. 10 - 5.70 M/uL    HGB 15.7 12.1 - 17.0 g/dL    HCT 45.6 36.6 - 50.3 %    MCV 86.0 80.0 - 99.0 FL    MCH 29.6 26.0 - 34.0 PG    MCHC 34.4 30.0 - 36.5 g/dL    RDW 12.4 11.5 - 14.5 %    PLATELET 229 039 - 789 K/uL    MPV 10.4 8.9 - 12.9 FL    NEUTROPHILS 80 (H) 32 - 75 %    LYMPHOCYTES 12 12 - 49 %    MONOCYTES 7 5 - 13 %    EOSINOPHILS 1 0 - 7 %    BASOPHILS 0 0 - 1 %    ABS. NEUTROPHILS 8.5 (H) 1.8 - 8.0 K/UL    ABS. LYMPHOCYTES 1.2 K/UL    ABS. MONOCYTES 0.7 0.0 - 1.0 K/UL    ABS. EOSINOPHILS 0.1 0.0 - 0.4 K/UL    ABS. BASOPHILS 0.0 0.0 - 0.1 K/UL    DF AUTOMATED     METABOLIC PANEL, COMPREHENSIVE   Result Value Ref Range    Sodium 141 136 - 145 mmol/L    Potassium 3.7 3.5 - 5.1 mmol/L    Chloride 105 97 - 108 mmol/L    CO2 29 21 - 32 mmol/L    Anion gap 7 5 - 15 mmol/L    Glucose 118 (H) 65 - 100 mg/dL    BUN 13 6 - 20 MG/DL    Creatinine 1.23 0.70 - 1.30 MG/DL    BUN/Creatinine ratio 11 (L) 12 - 20      GFR est AA >60 >60 ml/min/1.73m2    GFR est non-AA 58 (L) >60 ml/min/1.73m2    Calcium 8.5 8.5 - 10.1 MG/DL    Bilirubin, total 0.5 0.2 - 1.0 MG/DL    ALT (SGPT) 22 12 - 78 U/L    AST (SGOT) 19 15 - 37 U/L    Alk.  phosphatase 56 45 - 117 U/L    Protein, total 6.2 (L) 6.4 - 8.2 g/dL    Albumin 3.3 (L) 3.5 - 5.0 g/dL    Globulin 2.9 2.0 - 4.0 g/dL    A-G Ratio 1.1 1.1 - 2.2     CK W/ REFLX CKMB   Result Value Ref Range     39 - 308 U/L   URINALYSIS W/MICROSCOPIC   Result Value Ref Range    Color YELLOW/STRAW      Appearance HAZY (A) CLEAR      Specific gravity 1.010 1.003 - 1.030      pH (UA) 6.0 5.0 - 8.0      Protein NEGATIVE  NEG mg/dL    Glucose NEGATIVE  NEG mg/dL    Ketone NEGATIVE  NEG mg/dL    Bilirubin NEGATIVE  NEG      Blood TRACE (A) NEG      Urobilinogen 1.0 0.2 - 1.0 EU/dL    Nitrites NEGATIVE  NEG      Leukocyte Esterase MODERATE (A) NEG      WBC 20-50 0 - 4 /hpf    RBC 0-5 0 - 5 /hpf    Epithelial cells FEW FEW /lpf    Bacteria 1+ (A) NEG /hpf    Mucus TRACE (A) NEG /lpf   MAGNESIUM   Result Value Ref Range    Magnesium 2.1 1.6 - 2.4 mg/dL   NT-PRO BNP   Result Value Ref Range    NT pro- (H) 0 - 125 PG/ML   TROPONIN I   Result Value Ref Range    Troponin-I, Qt. <0.05 <0.05 ng/mL   EKG, 12 LEAD, INITIAL   Result Value Ref Range    Ventricular Rate 92 BPM    Atrial Rate 92 BPM    QRS Duration 104 ms    Q-T Interval 362 ms    QTC Calculation (Bezet) 447 ms    Calculated R Axis -50 degrees    Calculated T Axis 74 degrees    Diagnosis       Accelerated Junctional rhythm  Left anterior fascicular block  Moderate voltage criteria for LVH, may be normal variant  Cannot rule out Septal infarct (cited on or before 16-SEP-2019)  Abnormal ECG  When compared with ECG of 03-APR-2013 23:50,  Junctional rhythm has replaced Sinus rhythm  Vent. rate has increased BY  38 BPM  Questionable change in initial forces of Septal leads  QT has lengthened  Confirmed by Lizbet Potts MD., Megan ([id number]) on 9/17/2019 6:05:35 PM         Orders Placed This Encounter    CT HEAD W WO CONT     Standing Status:   Future     Standing Expiration Date:   2/28/2022     Order Specific Question:   STAT Creatinine as indicated     Answer: Yes    EMG LIMITED     Standing Status:   Future     Standing Expiration Date:   7/28/2021     Order Specific Question:   Reason for Exam:     Answer:   generalized muscle weakness    carbidopa-levodopa (Sinemet)  mg per tablet     Sig: Take 1 tablet by mouth at 8 am, 12 noon, 4 pm, and 8 pm     Dispense:  120 Tab     Refill:  5       1. Alzheimer's dementia without behavioral disturbance, unspecified timing of dementia onset ([address]    2. Weakness generalized    3. Parkinsonism, unspecified Parkinsonism type ([address]      Worsening tremors. Try to re-initiate Sinemet for parkinsonsim- 1 tablet four times daily. Call in 1 week to discussed changes. Did go to [facility] for 2nd opinion on dementia caused parkinson or true parkinson and was not given any direction   CT head to re-evaluate for atrophy, changes. MRI unable due to pacer. Significantly worsening muscle weakness all over. Tremors are worse. Falling a lot. Can not care for himself well. Will get EMG to look at causes of significant muscle weakness.        This note will not be viewable in [employer]

## 2021-02-11 ENCOUNTER — OFFICE VISIT (OUTPATIENT)
Dept: NEUROLOGY | Age: 71
End: 2021-02-11

## 2021-02-11 ENCOUNTER — HOSPITAL ENCOUNTER (OUTPATIENT)
Dept: CT IMAGING | Age: 71
Discharge: HOME | End: 2021-02-11
Attending: NURSE PRACTITIONER
Payer: MEDICARE

## 2021-02-11 VITALS — TEMPERATURE: 97.2 F

## 2021-02-11 DIAGNOSIS — F02.80 ALZHEIMER'S DEMENTIA WITHOUT BEHAVIORAL DISTURBANCE, UNSPECIFIED TIMING OF DEMENTIA ONSET: ICD-10-CM

## 2021-02-11 DIAGNOSIS — R53.1 WEAKNESS GENERALIZED: ICD-10-CM

## 2021-02-11 DIAGNOSIS — R53.1 WEAKNESS GENERALIZED: Primary | ICD-10-CM

## 2021-02-11 DIAGNOSIS — G30.9 ALZHEIMER'S DEMENTIA WITHOUT BEHAVIORAL DISTURBANCE, UNSPECIFIED TIMING OF DEMENTIA ONSET: ICD-10-CM

## 2021-02-11 DIAGNOSIS — G20 PARKINSONISM, UNSPECIFIED PARKINSONISM TYPE (HCC): ICD-10-CM

## 2021-02-11 PROCEDURE — 70450 CT HEAD/BRAIN W/O DYE: CPT

## 2021-02-11 NOTE — LETTER
2021 11:25 AM 
 
Patient:  Sai Muniz YOB: 1950 Date of Visit: 2021 Dear Rhina Slaughter MD 
[address] [facility] [place] [address] [address] Via Fax: [phone]: Thank you for referring Mr. Justin Munoz to me for EMG/NCS. EMG/ NCS Report [facility] - INPATIENT [address] [facility], [address] [place] Ph: [phone] FAX: [phone] Test Date:  2019 Test Date:  2021 Patient: Gabriel Jackson : 1950 Physician: Kishan Bah MD  
Sex: Male Height: ' \" Ref Alyssia Lesser  
ID#: [id number] Weight:  lbs. Technician: Belen Bennett Patient History / Exam: 
Patient comes in with generalized weakness with L hand tremor and dementia. (-) numbness Patient is coming for myopathy evaluation. EMG & NCV Findings: Evaluation of the left Fibular motor nerve showed normal distal onset latency (4.4 ms), normal amplitude (3.0 mV), normal conduction velocity (B Fib-Ankle, 39 m/s), and normal conduction velocity (Poplt-B Fib, 59 m/s). The left median motor nerve showed normal distal onset latency (3.7 ms), normal amplitude (6.7 mV), and normal conduction velocity (Elbow-Wrist, 51 m/s). The left tibial motor nerve showed normal distal onset latency (5.2 ms), normal amplitude (2.3 mV), and normal conduction velocity (Knee-Ankle, 39 m/s). The left ulnar motor nerve showed normal distal onset latency (3.0 ms), normal amplitude (8.6 mV), normal conduction velocity (B Elbow-Wrist, 50 m/s), and normal conduction velocity (A Elbow-B Elbow, 59 m/s). The left median sensory, the left radial sensory, the right sural sensory, and the left ulnar sensory nerves showed normal distal peak latency (L3.6, L2.3, R3.5, L3.7 ms) and normal amplitude (L33.2, L27.3, R15.2, L34.2 µV). The left Sup Fibular sensory and the right Sup Fibular sensory nerves showed normal distal peak latency (L3.5, R3.8 ms), normal amplitude (L5.5, R31.9 µV), and decreased conduction velocity (Lower leg-Lat ankle, L34, R37 m/s). The left sural sensory nerve showed normal distal peak latency (4.5 ms) and reduced amplitude (2.7 µV). The left median/ulnar (palm) comparison nerve showed normal distal onset latency (Median Palm, 1.5 ms), normal distal peak latency (Median Palm, 2.0 ms), normal amplitude (Median Palm, 36.1 µV), normal distal onset latency (Ulnar Palm, 1.4 ms), normal distal peak latency (Ulnar Palm, 1.9 ms), and normal amplitude (Ulnar Palm, 11.2 µV). F Wave studies indicate that the left tibial F wave has prolonged latency (57.12 ms). All remaining F Wave latencies were within normal limits. All examined muscles (as indicated in the following table) showed no evidence of electrical instability. Impression: Extensive electrodiagnostic examination of the left upper and left lower extremities shows reduced sural sensory amplitude response, which in isolation, is of unclear clinical significance for this age group. (patient denies numbness) Otherwise, there is no definite evidence of a generalized myopathic process, cervical motor radiculopathy or lumbosacral motor radiculopathy on the left. Phoebe Phillips MD 
Diplomate, American Board of Psychiatry and Neurology Diplomate, Neuromuscular Medicine Diplomate, [address] [place] [address] Board of Electrodiagnostic Medicine Director, [address] [place] [address] Accredited Laboratory with Exemplary Status Nerve Conduction Studies Anti Sensory Summary Table Stim Site NR Peak (ms) Norm Peak (ms) P-T Amp (µV) Norm P-T Amp Site1 Site2 Dist (cm) Left Median Anti Sensory (2nd Digit)  29.2°C Wrist    3.6 <4 33.2 >13 Wrist 2nd Digit 14.0 Elbow    3.6  31.8  Elbow Wrist 0.0 Left Radial Anti Sensory (Base 1st Digit)  29°C Wrist    2.3 <2.8 27.3 >11 Wrist Base 1st Digit 10.0 Site 2    2.3  28.1 Left Sup Fibular Anti Sensory (Lat ankle)  29.2°C Lower leg    3.5 <4.6 5.5 >4 Lower leg Lat ankle 10.0 Left Sural Anti Sensory (Lat Mall)  30.1°C Calf    4.5 <4.5 2.7 >4.0 Calf Lat Mall 14.0 Site 2    4.3  3.0 Site 3    4.4  3.2 Left Ulnar Anti Sensory (5th Digit)  29°C Wrist    3.7 <4.0 34.2 >9 Wrist 5th Digit 14.0 B Elbow    3.7  34.6  B Elbow Wrist 0.0 Motor Summary Table Stim Site NR Onset (ms) Norm Onset (ms) O-P Amp (mV) Norm O-P Amp Amp (Prev) (%) Site1 Site2 Dist (cm) Gopi (m/s) Norm Gopi (m/s) Left Fibular Motor (Ext Dig Brev)  29.1°C Ankle    4.4 <6.5 3.0 >1.1 100.0 Ankle Ext Dig Brev 8.0 B Fib    12.8  2.7  90.0 B Fib Ankle 33.0 39 >38 Poplt    14.5  2.6  96.3 Poplt B Fib 10.0 59 >42 Left Median Motor (Abd Poll Brev)  29°C Wrist    3.7 <4.5 6.7 >4.1 100.0 Wrist Abd Poll Brev 8.0 Elbow    8.0  5.5  82.1 Elbow Wrist 22.0 51 >49 Left Tibial Motor (Abd Tompkins Brev)  29.3°C Ankle    5.2 <6.1 2.3 >1.1 100.0 Ankle Abd Tompkins Brev 8.0 Knee    14.5  1.9  82.6 Knee Ankle 36.0 39 >39 Left Ulnar Motor (Abd Dig Minimi)  30.9°C Wrist    3.0 <3.1 8.6 >7.0 100.0 Wrist Abd Dig Minimi 8.0  >50 B Elbow    7.8  6.9  80.2 B Elbow Wrist 24.0 50 >50 A Elbow    9.5  7.2  104.3 A Elbow B Elbow 10.0 59 >50 Comparison Summary Table Stim Site NR Peak (ms) P-T Amp (µV) Site1 Site2 Dist (cm) Delta-0 (ms) Left Median/Ulnar Palm Comparison (Wrist)  29.7°C Median Palm    2.0 36.9 Median Palm Ulnar Palm 8.0 0.1 Ulnar Palm    1.9 17.2 F Wave Studies NR F-Lat (ms) Lat Norm (ms) L-R F-Lat (ms) L-R Lat Norm Left Tibial (Mrkrs) (Abd Hallucis)  29.4°C  
   57.12 <56  <5.7 Left Ulnar (Mrkrs) (Abd Dig Min)  29.8°C  
   30.08 <32  <2.5 H Reflex Studies NR H-Lat (ms) L-R H-Lat (ms) L-R Lat Norm Left Tibial (Gastroc)  29.4°C  
   36.68  <2.0 EMG Side Muscle Nerve Root Ins Act Fibs Psw Recrt Duration Amp Poly Comment Left Ext Dig Brev Dp Br Peron L5, S1 Nml Nml Nml Nml Nml Nml Nml Left AbdHallucis MedPlantar S1-2 Nml Nml Nml Nml Nml Nml Nml Left AntTibialis Dp Br Peron L4-5 Nml Nml Nml Nml Nml Nml Nml Left MedGastroc Tibial S1-2 Nml Nml Nml Nml Nml Nml Nml Left VastusLat Femoral L2-4 Nml Nml Nml Nml Nml Nml Nml Left GluteusMed SupGluteal L4-S1 Nml Nml Nml Nml Nml Nml Nml Left Lower Lumb Parasp Rami L5,S1 Nml Nml Nml Nml Nml Nml Nml Left 1stDorInt Ulnar C8-T1 Nml Nml Nml Nml Nml Nml Nml Left ExtIndicis Radial (Post Int) C7-8 Nml Nml Nml Nml Nml Nml Nml Left Abd Poll Brev Median C8-T1 Nml Nml Nml Nml Nml Nml Nml Left Biceps Musculocut C5-6 Nml Nml Nml Nml Nml Nml Nml Left Triceps Radial C6-7-8 Nml Nml Nml Nml Nml Nml Nml Left Deltoid Axillary C5-6 Nml Nml Nml Nml Nml Nml Nml Left Lower Cerv Parasp Rami C7,T1 Nml Nml Nml Nml Nml Nml Nml Nerve Conduction Studies Anti Sensory Left/Right Comparison Stim Site L Lat (ms) R Lat (ms) L-R Lat (ms) L Amp (µV) R Amp (µV) L-R Amp (%) Site1 Site2 L Gopi (m/s) R Gopi (m/s) L-R Gopi (m/s) Median Anti Sensory (2nd Digit)  29.2°C Wrist 2.7   33.2   Wrist 2nd Digit 52 Elbow 2.8   31.8   Elbow Wrist     
Radial Anti Sensory (Base 1st Digit)  29°C Wrist 1.6   27.3   Wrist Base 1st Digit 63 Site 2 1.6   28.1 Sup Fibular Anti Sensory (Lat ankle)  29.2°C Lower leg 2.9   5.5  82.8 Lower leg Lat ankle 34 Sural Anti Sensory (Lat Mall)  30.1°C Calf 3.6   2.7  82.2 Calf Lat Mall 39 Site 2 3.6   3.0  81.3 Site 3 3.7   3.2 Ulnar Anti Sensory (5th Digit)  29°C Wrist 2.7   34.2   Wrist 5th Digit 52 B Elbow 2.8   34.6   B Elbow Wrist     
 
Motor Left/Right Comparison Stim Site L Lat (ms) R Lat (ms) L-R Lat (ms) L Amp (mV) R Amp (mV) L-R Amp (%) Site1 Site2 L Gopi (m/s) R Gopi (m/s) L-R Gopi (m/s) Fibular Motor (Ext Dig Brev)  29.1°C Ankle 4.4  9.4 3.0  100.0 Ankle Ext Dig Brev     
B Fib 12.8   2.7   B Fib Ankle 39 Poplt 14.5   2.6   Poplt B Fib 59 Median Motor (Abd Poll Brev)  29°C Wrist 3.7   6.7   Wrist Abd Poll Brev     
Elbow 8.0   5.5   Elbow Wrist 51 Tibial Motor (Abd Tompkins Brev)  29.3°C Ankle 5.2   2.3   Ankle Abd Tompkins Brev     
Knee 14.5   1.9   Knee Ankle 39 Ulnar Motor (Abd Dig Minimi)  30.9°C Wrist 3.0   8.6   Wrist Abd Dig Minimi B Elbow 7.8   6.9   B Elbow Wrist 50 A Elbow 9.5   7.2   A Elbow B Elbow 59 Comparison Left/Right Comparison Stim Site L Lat (ms) R Lat (ms) L-R Lat (ms) L Amp (µV) R Amp (µV) L-R Amp (%) Median/Ulnar Palm Comparison (Wrist)  29.7°C Median Palm 1.5   36.1 Ulnar Palm 1.4   11.2 Waveforms:

## 2021-02-11 NOTE — PROCEDURES
EMG/ NCS Report  [facility] - INPATIENT  [address] [place], [address]   Ph: [phone]   FAX: [phone]  Test Date:  2019      Test Date:  2021    Patient: Claudia Conroy : 1950 Physician: Iibs Patel MD   Sex: Male Height: ' \" Ref Phys: Socorro Hanna   ID#: [id number] Weight:  lbs. Technician: Russell Rudd     Patient History / Exam:  Patient comes in with generalized weakness with L hand tremor and dementia. (-) numbness    Patient is coming for myopathy evaluation. EMG & NCV Findings:  Evaluation of the left Fibular motor nerve showed normal distal onset latency (4.4 ms), normal amplitude (3.0 mV), normal conduction velocity (B Fib-Ankle, 39 m/s), and normal conduction velocity (Poplt-B Fib, 59 m/s). The left median motor nerve showed normal distal onset latency (3.7 ms), normal amplitude (6.7 mV), and normal conduction velocity (Elbow-Wrist, 51 m/s). The left tibial motor nerve showed normal distal onset latency (5.2 ms), normal amplitude (2.3 mV), and normal conduction velocity (Knee-Ankle, 39 m/s). The left ulnar motor nerve showed normal distal onset latency (3.0 ms), normal amplitude (8.6 mV), normal conduction velocity (B Elbow-Wrist, 50 m/s), and normal conduction velocity (A Elbow-B Elbow, 59 m/s). The left median sensory, the left radial sensory, the right sural sensory, and the left ulnar sensory nerves showed normal distal peak latency (L3.6, L2.3, R3.5, L3.7 ms) and normal amplitude (L33.2, L27.3, R15.2, L34.2 µV). The left Sup Fibular sensory and the right Sup Fibular sensory nerves showed normal distal peak latency (L3.5, R3.8 ms), normal amplitude (L5.5, R31.9 µV), and decreased conduction velocity (Lower leg-Lat ankle, L34, R37 m/s). The left sural sensory nerve showed normal distal peak latency (4.5 ms) and reduced amplitude (2.7 µV).   The left median/ulnar (palm) comparison nerve showed normal distal onset latency (Median Palm, 1.5 ms), normal distal peak latency (Median Palm, 2.0 ms), normal amplitude (Median Palm, 36.1 µV), normal distal onset latency (Ulnar Palm, 1.4 ms), normal distal peak latency (Ulnar Palm, 1.9 ms), and normal amplitude (Ulnar Palm, 11.2 µV). F Wave studies indicate that the left tibial F wave has prolonged latency (57.12 ms). All remaining F Wave latencies were within normal limits. All examined muscles (as indicated in the following table) showed no evidence of electrical instability. Impression:    Extensive electrodiagnostic examination of the left upper and left lower extremities shows reduced sural sensory amplitude response, which in isolation, is of unclear clinical significance for this age group. (patient denies numbness)    Otherwise, there is no definite evidence of a generalized myopathic process, cervical motor radiculopathy or lumbosacral motor radiculopathy on the left.         Kishan Bah MD  Diplomate, American Board of Psychiatry and Neurology  Diplomate, Neuromuscular Medicine  Diplomate, American Board of Electrodiagnostic Medicine  Director, [address] [place] [address] Accredited Laboratory with Exemplary Status          Nerve Conduction Studies  Anti Sensory Summary Table     Stim Site NR Peak (ms) Norm Peak (ms) P-T Amp (µV) Norm P-T Amp Site1 Site2 Dist (cm)   Left Median Anti Sensory (2nd Digit)  29.2°C   Wrist    3.6 <4 33.2 >13 Wrist 2nd Digit 14.0   Elbow    3.6  31.8  Elbow Wrist 0.0   Left Radial Anti Sensory (Base 1st Digit)  29°C   Wrist    2.3 <2.8 27.3 >11 Wrist Base 1st Digit 10.0   Site 2    2.3  28.1       Left Sup Fibular Anti Sensory (Lat ankle)  29.2°C   Lower leg    3.5 <4.6 5.5 >4 Lower leg Lat ankle 10.0   Left Sural Anti Sensory (Lat Mall)  30.1°C   Calf    4.5 <4.5 2.7 >4.0 Calf Lat Mall 14.0   Site 2    4.3  3.0       Site 3    4.4  3.2       Left Ulnar Anti Sensory (5th Digit)  29°C   Wrist    3.7 <4.0 34.2 >9 Wrist 5th Digit 14.0   B Elbow    3.7  34.6  B Elbow Wrist 0.0     Motor Summary Table     Stim Site NR Onset (ms) Norm Onset (ms) O-P Amp (mV) Norm O-P Amp Amp (Prev) (%) Site1 Site2 Dist (cm) Gopi (m/s) Norm Gopi (m/s)   Left Fibular Motor (Ext Dig Brev)  29.1°C   Ankle    4.4 <6.5 3.0 >1.1 100.0 Ankle Ext Dig Brev 8.0     B Fib    12.8  2.7  90.0 B Fib Ankle 33.0 39 >38   Poplt    14.5  2.6  96.3 Poplt B Fib 10.0 59 >42   Left Median Motor (Abd Poll Brev)  29°C   Wrist    3.7 <4.5 6.7 >4.1 100.0 Wrist Abd Poll Brev 8.0     Elbow    8.0  5.5  82.1 Elbow Wrist 22.0 51 >49   Left Tibial Motor (Abd Tompkins Brev)  29.3°C   Ankle    5.2 <6.1 2.3 >1.1 100.0 Ankle Abd Tompkins Brev 8.0     Knee    14.5  1.9  82.6 Knee Ankle 36.0 39 >39   Left Ulnar Motor (Abd Dig Minimi)  30.9°C   Wrist    3.0 <3.1 8.6 >7.0 100.0 Wrist Abd Dig Minimi 8.0  >50   B Elbow    7.8  6.9  80.2 B Elbow Wrist 24.0 50 >50   A Elbow    9.5  7.2  104.3 A Elbow B Elbow 10.0 59 >50     Comparison Summary Table     Stim Site NR Peak (ms) P-T Amp (µV) Site1 Site2 Dist (cm) Delta-0 (ms)   Left Median/Ulnar Palm Comparison (Wrist)  29.7°C   Median Palm    2.0 36.9 Median Palm Ulnar Palm 8.0 0.1   Ulnar Palm    1.9 17.2         F Wave Studies     NR F-Lat (ms) Lat Norm (ms) L-R F-Lat (ms) L-R Lat Norm   Left Tibial (Mrkrs) (Abd Hallucis)  29.4°C      57.12 <56  <5.7   Left Ulnar (Mrkrs) (Abd Dig Min)  29.8°C      30.08 <32  <2.5     H Reflex Studies     NR H-Lat (ms) L-R H-Lat (ms) L-R Lat Norm   Left Tibial (Gastroc)  29.4°C      36.68  <2.0     EMG     Side Muscle Nerve Root Ins Act Fibs Psw Recrt Duration Amp Poly Comment   Left Ext Dig Brev Dp Br Peron L5, S1 Nml Nml Nml Nml Nml Nml Nml    Left AbdHallucis MedPlantar S1-2 Nml Nml Nml Nml Nml Nml Nml    Left AntTibialis Dp Br Peron L4-5 Nml Nml Nml Nml Nml Nml Nml    Left MedGastroc Tibial S1-2 Nml Nml Nml Nml Nml Nml Nml    Left VastusLat Femoral L2-4 Nml Nml Nml Nml Nml Nml Nml    Left GluteusMed SupGluteal L4-S1 Nml Nml Nml Nml Nml Nml Nml    Left Lower Lumb Parasp Rami L5,S1 Nml Nml Nml Nml Nml Nml Nml    Left 1stDorInt Ulnar C8-T1 Nml Nml Nml Nml Nml Nml Nml    Left ExtIndicis Radial (Post Int) C7-8 Nml Nml Nml Nml Nml Nml Nml    Left Abd Poll Brev Median C8-T1 Nml Nml Nml Nml Nml Nml Nml    Left Biceps Musculocut C5-6 Nml Nml Nml Nml Nml Nml Nml    Left Triceps Radial C6-7-8 Nml Nml Nml Nml Nml Nml Nml    Left Deltoid Axillary C5-6 Nml Nml Nml Nml Nml Nml Nml    Left Lower Cerv Parasp Rami C7,T1 Nml Nml Nml Nml Nml Nml Nml                Nerve Conduction Studies  Anti Sensory Left/Right Comparison     Stim Site L Lat (ms) R Lat (ms) L-R Lat (ms) L Amp (µV) R Amp (µV) L-R Amp (%) Site1 Site2 L Gopi (m/s) R Gopi (m/s) L-R Gopi (m/s)   Median Anti Sensory (2nd Digit)  29.2°C   Wrist 2.7   33.2   Wrist 2nd Digit 52     Elbow 2.8   31.8   Elbow Wrist      Radial Anti Sensory (Base 1st Digit)  29°C   Wrist 1.6   27.3   Wrist Base 1st Digit 63     Site 2 1.6   28.1          Sup Fibular Anti Sensory (Lat ankle)  29.2°C   Lower leg 2.9   5.5  82.8 Lower leg Lat ankle 34     Sural Anti Sensory (Lat Mall)  30.1°C   Calf 3.6   2.7  82.2 Calf Lat Mall 39     Site 2 3.6   3.0  81.3        Site 3 3.7   3.2          Ulnar Anti Sensory (5th Digit)  29°C   Wrist 2.7   34.2   Wrist 5th Digit 52     B Elbow 2.8   34.6   B Elbow Wrist        Motor Left/Right Comparison     Stim Site L Lat (ms) R Lat (ms) L-R Lat (ms) L Amp (mV) R Amp (mV) L-R Amp (%) Site1 Site2 L Gopi (m/s) R Gopi (m/s) L-R Gopi (m/s)   Fibular Motor (Ext Dig Brev)  29.1°C   Ankle 4.4  9.4 3.0  100.0 Ankle Ext Dig Brev      B Fib 12.8   2.7   B Fib Ankle 39     Poplt 14.5   2.6   Poplt B Fib 59     Median Motor (Abd Poll Brev)  29°C   Wrist 3.7   6.7   Wrist Abd Poll Brev      Elbow 8.0   5.5   Elbow Wrist 51     Tibial Motor (Abd Tompkins Brev)  29.3°C   Ankle 5.2   2.3   Ankle Abd Tompkins Brev      Knee 14.5   1.9   Knee Ankle 39     Ulnar Motor (Abd Dig Minimi)  30.9°C   Wrist 3.0 8.6   Wrist Abd Dig Minimi      B Elbow 7.8   6.9   B Elbow Wrist 50     A Elbow 9.5   7.2   A Elbow B Elbow 59       Comparison Left/Right Comparison     Stim Site L Lat (ms) R Lat (ms) L-R Lat (ms) L Amp (µV) R Amp (µV) L-R Amp (%)   Median/Ulnar Palm Comparison (Wrist)  29.7°C   Median Palm 1.5   36.1     Ulnar Palm 1.4   11.2           Waveforms:

## 2021-02-22 DIAGNOSIS — F02.80 ALZHEIMER'S DEMENTIA WITHOUT BEHAVIORAL DISTURBANCE, UNSPECIFIED TIMING OF DEMENTIA ONSET: ICD-10-CM

## 2021-02-22 DIAGNOSIS — G30.9 ALZHEIMER'S DEMENTIA WITHOUT BEHAVIORAL DISTURBANCE, UNSPECIFIED TIMING OF DEMENTIA ONSET: ICD-10-CM

## 2021-02-22 DIAGNOSIS — G20 PARKINSONISM, UNSPECIFIED PARKINSONISM TYPE (HCC): ICD-10-CM

## 2021-02-23 RX ORDER — CARBIDOPA AND LEVODOPA 25; 100 MG/1; MG/1
TABLET ORAL
Qty: 450 TAB | Refills: 1 | Status: SHIPPED | OUTPATIENT
Start: 2021-02-23

## 2021-02-25 ENCOUNTER — OFFICE VISIT (OUTPATIENT)
Dept: NEUROLOGY | Age: 71
End: 2021-02-25
Payer: MEDICARE

## 2021-02-25 VITALS
OXYGEN SATURATION: 98 % | SYSTOLIC BLOOD PRESSURE: 122 MMHG | DIASTOLIC BLOOD PRESSURE: 60 MMHG | TEMPERATURE: 96.8 F | HEART RATE: 63 BPM

## 2021-02-25 DIAGNOSIS — M48.02 CERVICAL STENOSIS OF SPINE: Primary | ICD-10-CM

## 2021-02-25 DIAGNOSIS — G30.9 ALZHEIMER'S DEMENTIA WITHOUT BEHAVIORAL DISTURBANCE, UNSPECIFIED TIMING OF DEMENTIA ONSET: ICD-10-CM

## 2021-02-25 DIAGNOSIS — R29.898 LEFT ARM WEAKNESS: ICD-10-CM

## 2021-02-25 DIAGNOSIS — G20 PARKINSONISM, UNSPECIFIED PARKINSONISM TYPE (HCC): ICD-10-CM

## 2021-02-25 DIAGNOSIS — F02.80 ALZHEIMER'S DEMENTIA WITHOUT BEHAVIORAL DISTURBANCE, UNSPECIFIED TIMING OF DEMENTIA ONSET: ICD-10-CM

## 2021-02-25 PROCEDURE — G8420 CALC BMI NORM PARAMETERS: HCPCS | Performed by: NURSE PRACTITIONER

## 2021-02-25 PROCEDURE — 1101F PT FALLS ASSESS-DOCD LE1/YR: CPT | Performed by: NURSE PRACTITIONER

## 2021-02-25 PROCEDURE — G8428 CUR MEDS NOT DOCUMENT: HCPCS | Performed by: NURSE PRACTITIONER

## 2021-02-25 PROCEDURE — 99215 OFFICE O/P EST HI 40 MIN: CPT | Performed by: NURSE PRACTITIONER

## 2021-02-25 PROCEDURE — G8536 NO DOC ELDER MAL SCRN: HCPCS | Performed by: NURSE PRACTITIONER

## 2021-02-25 PROCEDURE — G8432 DEP SCR NOT DOC, RNG: HCPCS | Performed by: NURSE PRACTITIONER

## 2021-02-25 PROCEDURE — 3017F COLORECTAL CA SCREEN DOC REV: CPT | Performed by: NURSE PRACTITIONER

## 2021-02-25 RX ORDER — BENZTROPINE MESYLATE 1 MG/1
1 TABLET ORAL 3 TIMES DAILY
Qty: 90 TAB | Refills: 5 | Status: SHIPPED | OUTPATIENT
Start: 2021-02-25 | End: 2021-03-25 | Stop reason: ALTCHOICE

## 2021-02-25 NOTE — PROGRESS NOTES
Sharon Domínguez is a 65 y.o. male who presents with the following  Chief Complaint   Patient presents with    Follow-up    Results       HPI      Patient comes in with wife for a follow up for dementia and tremors. He was on Sinemet at one time and thinks this was helping but taken off because he wanted to try to come off medications. Sinemet 1.5 tablets four times daily had no changes. Never used Amantadine, Cogentin. Tremors are debilitating. Tiring him out. He has noticed and increase in tremor specifically on the left side and LUE is resting and action. He does have some action tremor in the RUE also. He has not fallen but is very unsteady, unbalanced, and has trouble initiating steps. Posture is stooped. He does have underlying dementia. To be re-evaluated in April by neuropsych as AD vs. Lewy body     Saw [facility] with no real direction or change. Muscle weakness in all four extremities also. No falls. Vivid dreams               Allergies   Allergen Reactions    Gantrisin Rash       Current Outpatient Medications   Medication Sig    benztropine (COGENTIN) 1 mg tablet Take 1 Tab by mouth three (3) times daily.  carbidopa-levodopa (SINEMET)  mg per tablet TAKE 1 TABLET BY MOUTH 5 TIMES A DAY    donepeziL (ARICEPT) 10 mg tablet TAKE 1 TABLET BY MOUTH EVERYDAY AT BEDTIME    FLUoxetine (PROZAC) 10 mg tablet TAKE 1 TABLET BY MOUTH EVERY DAY    levothyroxine (SYNTHROID) 112 mcg tablet     fludrocortisone (FLORINEF) 0.1 mg tablet Take 0.1 mg by mouth daily.  finasteride (PROSCAR) 5 mg tablet Take 5 mg by mouth daily.  melatonin 3 mg tablet Take  by mouth nightly.  tamsulosin (FLOMAX) 0.4 mg capsule Take 0.4 mg by mouth two (2) times a day.  aspirin delayed-release 81 mg tablet Take 325 mg by mouth daily. No current facility-administered medications for this visit.         Social History     Tobacco Use   Smoking Status Never Smoker   Smokeless Tobacco Never Used       Past Medical History:   Diagnosis Date    Anxiety     Aortic insufficiency regurgitation 11/9/2010    Arthritis     neck    Atrial flutter (HCC)     cardioversion 5/18/11    BPH (benign prostatic hyperplasia)     Hypothyroid     Migraines     mobitz 1, atrioventricular block 11/9/2010       Past Surgical History:   Procedure Laterality Date    ECHO 2D ADULT  9/2010    normal LV wall motion and ejection fraction, left ventricular enlargement, left atrial enlargement, severe aortic regurgitation and mild mitral regurgitation. The ejection fraction was 55-60%.  ECHO 2D ADULT  2/2011    mild LVE, EF 58%, LAE, large Ao root, Severe AI, 2+MR    ECHO 2D ADULT  6/2011    LVH, EF 60%, trace -mild MR    HX CATARACT REMOVAL      RIGHT EYE    HX GI      inginal hernia repair 2008    HX HEART CATHETERIZATION  1987    normal coronary arteries normal left ventricular function.  HX HEART CATHETERIZATION  2011    Normal coronaries    HX HEART VALVE SURGERY  4/2011    AVR, #[address] Epic porcine    HX HERNIA REPAIR      HX PACEMAKER  1987    VVI, after stopping \"voltarin\" his block resolved.  Subsequently battery depleted in 2002 and was not replaced       Family History   Problem Relation Age of Onset    Alcohol abuse Mother     Lung Disease Mother         EMPHYSEMA    Cancer Mother         LUNG CA    Stroke Mother     Heart Disease Father         MI       Social History     Socioeconomic History    Marital status:      Spouse name: Not on file    Number of children: Not on file    Years of education: Not on file    Highest education level: Not on file   Tobacco Use    Smoking status: Never Smoker    Smokeless tobacco: Never Used   Substance and Sexual Activity    Alcohol use: No     Frequency: Never     Comment: OCCASIONAL ETOH    Drug use: No     Types: Prescription    Sexual activity: Not Currently   Other Topics Concern       Review of Systems   Eyes: Negative for blurred vision, double vision and photophobia.   Respiratory: Negative for shortness of breath and wheezing.    Cardiovascular: Negative for chest pain and palpitations.   Gastrointestinal: Negative for nausea and vomiting.   Musculoskeletal: Positive for neck pain. Negative for falls.   Neurological: Positive for dizziness, tingling, tremors, sensory change and weakness. Negative for seizures, loss of consciousness and headaches.       Remainder of comprehensive review is negative.     Physical Exam :    Visit Vitals  /60   Pulse 63   Temp 96.8 °F (36 °C)   SpO2 98%     General: Well defined, nourished, and groomed individual in no acute distress.    Neck: Supple, nontender, no bruits, no pain with resistance to active range of motion.    Musculoskeletal: Extremities revealed no edema and had full range of motion of joints.    Psych: Good mood and bright affect     NEUROLOGICAL EXAMINATION:    Mental Status: Alert and oriented to person, place, and time     Cranial Nerves:    II, III, IV, VI: Visual acuity grossly intact. Visual fields are normal.    Pupils are equal, round, and reactive to light and accommodation.    Extra-ocular movements are full and fluid. Fundoscopic exam was benign, no ptosis or nystagmus.    V-XII: Hearing is grossly intact. Facial features are symmetric, with normal sensation and strength. The palate rises symmetrically and the tongue protrudes midline. Sternocleidomastoids 5/5.      Motor Examination: Normal tone, bulk, and strength, 3/5 muscle strength throughout.      Coordination: moderate LUE resting tremor.      Gait and Station: stooped posture, 5 step turn around. No arm swing.       Reflexes: DTRs 1+ throughout.     Neurosensory Exam:  Stocking glove sensory loss to temperature, vibration, and pinprick to the thighs      Results for orders placed or performed during the hospital encounter of 09/16/19   URINE CULTURE HOLD SAMPLE    Specimen: Serum; Urine   Result Value Ref Range     Urine culture hold        URINE ON HOLD IN MICROBIOLOGY DEPT FOR 3 DAYS. IF UNPRESERVED URINE IS SUBMITTED, IT CANNOT BE USED FOR ADDITIONAL TESTING AFTER 24 HRS, RECOLLECTION WILL BE REQUIRED. CULTURE, URINE    Specimen: Clean catch; Urine   Result Value Ref Range    Special Requests: NO SPECIAL REQUESTS      [place] Count >100,000  COLONIES/mL        Culture result: ENTEROCOCCUS FAECALIS GROUP D (A)         Susceptibility    Enterococcus  faecalis group D - STEVE     Ampicillin ($) <=2 Susceptible ug/mL     Daptomycin ($$$$$) 1 Susceptible ug/mL     Linezolid ($$$$$) 2 Susceptible ug/mL     Nitrofurantoin <=32 Susceptible ug/mL     Penicillin G ($$) 2 Susceptible ug/mL     Vancomycin ($) 2 Susceptible ug/mL     Levofloxacin ($) <=1 Susceptible ug/mL     Ciprofloxacin ($) <=1 Susceptible ug/mL     Tetracycline >8 Resistant ug/mL   CBC WITH AUTOMATED DIFF   Result Value Ref Range    WBC 10.5 4.1 - 11.1 K/uL    RBC 5.30 4. 10 - 5.70 M/uL    HGB 15.7 12.1 - 17.0 g/dL    HCT 45.6 36.6 - 50.3 %    MCV 86.0 80.0 - 99.0 FL    MCH 29.6 26.0 - 34.0 PG    MCHC 34.4 30.0 - 36.5 g/dL    RDW 12.4 11.5 - 14.5 %    PLATELET 526 199 - 868 K/uL    MPV 10.4 8.9 - 12.9 FL    NEUTROPHILS 80 (H) 32 - 75 %    LYMPHOCYTES 12 12 - 49 %    MONOCYTES 7 5 - 13 %    EOSINOPHILS 1 0 - 7 %    BASOPHILS 0 0 - 1 %    ABS. NEUTROPHILS 8.5 (H) 1.8 - 8.0 K/UL    ABS. LYMPHOCYTES 1.2 K/UL    ABS. MONOCYTES 0.7 0.0 - 1.0 K/UL    ABS. EOSINOPHILS 0.1 0.0 - 0.4 K/UL    ABS.  BASOPHILS 0.0 0.0 - 0.1 K/UL    DF AUTOMATED     METABOLIC PANEL, COMPREHENSIVE   Result Value Ref Range    Sodium 141 136 - 145 mmol/L    Potassium 3.7 3.5 - 5.1 mmol/L    Chloride 105 97 - 108 mmol/L    CO2 29 21 - 32 mmol/L    Anion gap 7 5 - 15 mmol/L    Glucose 118 (H) 65 - 100 mg/dL    BUN 13 6 - 20 MG/DL    Creatinine 1.23 0.70 - 1.30 MG/DL    BUN/Creatinine ratio 11 (L) 12 - 20      GFR est AA >60 >60 ml/min/1.73m2    GFR est non-AA 58 (L) >60 ml/min/1.73m2    Calcium 8.5 8.5 - 10.1 MG/DL    Bilirubin, total 0.5 0.2 - 1.0 MG/DL    ALT (SGPT) 22 12 - 78 U/L    AST (SGOT) 19 15 - 37 U/L    Alk. phosphatase 56 45 - 117 U/L    Protein, total 6.2 (L) 6.4 - 8.2 g/dL    Albumin 3.3 (L) 3.5 - 5.0 g/dL    Globulin 2.9 2.0 - 4.0 g/dL    A-G Ratio 1.1 1.1 - 2.2     CK W/ REFLX CKMB   Result Value Ref Range     39 - 308 U/L   URINALYSIS W/MICROSCOPIC   Result Value Ref Range    Color YELLOW/STRAW      Appearance HAZY (A) CLEAR      Specific gravity 1.010 1.003 - 1.030      pH (UA) 6.0 5.0 - 8.0      Protein NEGATIVE  NEG mg/dL    Glucose NEGATIVE  NEG mg/dL    Ketone NEGATIVE  NEG mg/dL    Bilirubin NEGATIVE  NEG      Blood TRACE (A) NEG      Urobilinogen 1.0 0.2 - 1.0 EU/dL    Nitrites NEGATIVE  NEG      Leukocyte Esterase MODERATE (A) NEG      WBC 20-50 0 - 4 /hpf    RBC 0-5 0 - 5 /hpf    Epithelial cells FEW FEW /lpf    Bacteria 1+ (A) NEG /hpf    Mucus TRACE (A) NEG /lpf   MAGNESIUM   Result Value Ref Range    Magnesium 2.1 1.6 - 2.4 mg/dL   NT-PRO BNP   Result Value Ref Range    NT pro- (H) 0 - 125 PG/ML   TROPONIN I   Result Value Ref Range    Troponin-I, Qt. <0.05 <0.05 ng/mL   EKG, 12 LEAD, INITIAL   Result Value Ref Range    Ventricular Rate 92 BPM    Atrial Rate 92 BPM    QRS Duration 104 ms    Q-T Interval 362 ms    QTC Calculation (Bezet) 447 ms    Calculated R Axis -50 degrees    Calculated T Axis 74 degrees    Diagnosis       Accelerated Junctional rhythm  Left anterior fascicular block  Moderate voltage criteria for LVH, may be normal variant  Cannot rule out Septal infarct (cited on or before 16-SEP-2019)  Abnormal ECG  When compared with ECG of 03-APR-2013 23:50,  Junctional rhythm has replaced Sinus rhythm  Vent.  rate has increased BY  38 BPM  Questionable change in initial forces of Septal leads  QT has lengthened  Confirmed by Lizbet Potts MD., Radha Orozco ([id number]) on 9/17/2019 6:05:35 PM         Orders Placed This Encounter    CT SPINE CERV W WO CONT     Standing Status:   Future Standing Expiration Date:   3/25/2022     Order Specific Question:   STAT Creatinine as indicated     Answer: Yes    XR SPINE CERV PA LAT ODONT 3 V MAX     Standing Status:   Future     Standing Expiration Date:   3/25/2022    benztropine (COGENTIN) 1 mg tablet     Sig: Take 1 Tab by mouth three (3) times daily. Dispense:  90 Tab     Refill:  5       1. Cervical stenosis of spine    2. Alzheimer's dementia without behavioral disturbance, unspecified timing of dementia onset ([facility] [address]    3. Parkinsonism, unspecified Parkinsonism type ([facility] [address]    4. Left arm weakness        Discussed tremor further  Stop Sinemet and start Cogentin 1/2 tablet BID X 1 week and increase to 1 mg tablet after. They will call us to discuss after 1 week   Discussed side effects   Discussed CT and EMG no questions. Keep active. Keep brain engaged. To get memory testing in April also to look further   If Lewybody no real treatment of tremor.      CT cervical spine to rule out myelopathy vs other causes             This note will not be viewable in [employer] [facility]

## 2021-02-25 NOTE — PATIENT INSTRUCTIONS
Start Cogentin 1/2 tablets by mouth twice daily.    Call in 1 week  And let us know how it is working

## 2021-02-27 ENCOUNTER — HOSPITAL ENCOUNTER (OUTPATIENT)
Dept: CT IMAGING | Age: 71
Discharge: HOME | End: 2021-02-27
Attending: NURSE PRACTITIONER
Payer: MEDICARE

## 2021-02-27 DIAGNOSIS — R29.898 LEFT ARM WEAKNESS: ICD-10-CM

## 2021-02-27 DIAGNOSIS — M48.02 CERVICAL STENOSIS OF SPINE: ICD-10-CM

## 2021-02-27 PROCEDURE — 72125 CT NECK SPINE W/O DYE: CPT

## 2021-03-25 ENCOUNTER — OFFICE VISIT (OUTPATIENT)
Dept: NEUROLOGY | Age: 71
End: 2021-03-25
Payer: MEDICARE

## 2021-03-25 VITALS
WEIGHT: 184.9 LBS | OXYGEN SATURATION: 100 % | TEMPERATURE: 97.9 F | DIASTOLIC BLOOD PRESSURE: 68 MMHG | HEART RATE: 59 BPM | BODY MASS INDEX: 25.08 KG/M2 | SYSTOLIC BLOOD PRESSURE: 130 MMHG | RESPIRATION RATE: 20 BRPM

## 2021-03-25 DIAGNOSIS — G20 PARKINSON'S DISEASE (HCC): Primary | ICD-10-CM

## 2021-03-25 PROCEDURE — G8536 NO DOC ELDER MAL SCRN: HCPCS | Performed by: NURSE PRACTITIONER

## 2021-03-25 PROCEDURE — G8432 DEP SCR NOT DOC, RNG: HCPCS | Performed by: NURSE PRACTITIONER

## 2021-03-25 PROCEDURE — G8419 CALC BMI OUT NRM PARAM NOF/U: HCPCS | Performed by: NURSE PRACTITIONER

## 2021-03-25 PROCEDURE — 3017F COLORECTAL CA SCREEN DOC REV: CPT | Performed by: NURSE PRACTITIONER

## 2021-03-25 PROCEDURE — 1101F PT FALLS ASSESS-DOCD LE1/YR: CPT | Performed by: NURSE PRACTITIONER

## 2021-03-25 PROCEDURE — 99214 OFFICE O/P EST MOD 30 MIN: CPT | Performed by: NURSE PRACTITIONER

## 2021-03-25 PROCEDURE — G8427 DOCREV CUR MEDS BY ELIG CLIN: HCPCS | Performed by: NURSE PRACTITIONER

## 2021-03-25 RX ORDER — AMANTADINE HYDROCHLORIDE 100 MG/1
100 CAPSULE, GELATIN COATED ORAL 2 TIMES DAILY
Qty: 60 CAP | Refills: 5 | Status: SHIPPED | OUTPATIENT
Start: 2021-03-25

## 2021-03-25 NOTE — PROGRESS NOTES
Started on new medication was only on it for about 4 days and it was messing with his head- confused, seeing things, strange remarks so he has been off of it for a while

## 2021-03-25 NOTE — PROGRESS NOTES
Joshua Delgadillo is a 65 y.o. male who presents with the following  Chief Complaint   Patient presents with    Follow-up     cervical stenosis of spine        HPI        FU with wife for worsening memory, tremors. To re-test with [facility] in April for memory. Few years ago was diagnosed with mild AD   Progressing ever since  On Aricept now. Namenda caused side effects. His wife is working. He does stay at home and can care for himself. His biggest issues are short term, concentration, focusing. Memory is also not his big concern. His parkinson symptoms continue to be inhibiting his day to day functioning. He has not seen any changes with Sinemet. This actually made things worse. We tried Cogentin and this helped the tremors but caused a significant change in mental status. He is off this and back to his baseline. He did see Dr. Charlotte Muhammad previously and was tried on Inderal with no change before. Did get to [facility] to evaluate parkinson and dementia related parkinsonsim. He was told to try the Sinemet again and come off Aricept. Which he did and things got a lot worse. significantly worse tremor in both UE and LE   Left side is a lot worse. He notices things are hard for him to do day to day   ADLS are tough. Wife can help but still having problems. He can not stop tremors. Nothing helps    Worse when anxious. He has fallen a few      We also repeated his EMG and CT brain and cervical spine   And these had nothing in causing his symptoms. Allergies   Allergen Reactions    Gantrisin Rash       Current Outpatient Medications   Medication Sig    amantadine HCL (SYMMETREL) 100 mg capsule Take 1 Cap by mouth two (2) times a day.     carbidopa-levodopa (SINEMET)  mg per tablet TAKE 1 TABLET BY MOUTH 5 TIMES A DAY    donepeziL (ARICEPT) 10 mg tablet TAKE 1 TABLET BY MOUTH EVERYDAY AT BEDTIME    FLUoxetine (PROZAC) 10 mg tablet TAKE 1 TABLET BY MOUTH EVERY DAY    levothyroxine (SYNTHROID) 112 mcg tablet     fludrocortisone (FLORINEF) 0.1 mg tablet Take 0.1 mg by mouth daily.  finasteride (PROSCAR) 5 mg tablet Take 5 mg by mouth daily.  melatonin 3 mg tablet Take  by mouth nightly.  tamsulosin (FLOMAX) 0.4 mg capsule Take 0.4 mg by mouth two (2) times a day.  aspirin delayed-release 81 mg tablet Take 325 mg by mouth daily. No current facility-administered medications for this visit. Social History     Tobacco Use   Smoking Status Never Smoker   Smokeless Tobacco Never Used       Past Medical History:   Diagnosis Date    Anxiety     Aortic insufficiency regurgitation 11/9/2010    Arthritis     neck    Atrial flutter (HCC)     cardioversion 5/18/11    BPH (benign prostatic hyperplasia)     Hypothyroid     Migraines     mobitz 1, atrioventricular block 11/9/2010       Past Surgical History:   Procedure Laterality Date    ECHO 2D ADULT  9/2010    normal LV wall motion and ejection fraction, left ventricular enlargement, left atrial enlargement, severe aortic regurgitation and mild mitral regurgitation. The ejection fraction was 55-60%.  ECHO 2D ADULT  2/2011    mild LVE, EF 58%, LAE, large Ao root, Severe AI, 2+MR    ECHO 2D ADULT  6/2011    LVH, EF 60%, trace -mild MR    HX CATARACT REMOVAL      RIGHT EYE    HX GI      inginal hernia repair 2008    HX HEART CATHETERIZATION  1987    normal coronary arteries normal left ventricular function.  HX HEART CATHETERIZATION  2011    Normal coronaries    HX HEART VALVE SURGERY  4/2011    AVR, [address] Epic porcine    HX HERNIA REPAIR      HX PACEMAKER  1987    VVI, after stopping \"voltarin\" his block resolved.  Subsequently battery depleted in 2002 and was not replaced       Family History   Problem Relation Age of Onset    Alcohol abuse Mother     Lung Disease Mother         EMPHYSEMA    Cancer Mother         LUNG CA    Stroke Mother     Heart Disease Father         MI       Social History     Socioeconomic History    Marital status:      Spouse name: Not on file    Number of children: Not on file    Years of education: Not on file    Highest education level: Not on file   Tobacco Use    Smoking status: Never Smoker    Smokeless tobacco: Never Used   Substance and Sexual Activity    Alcohol use: No     Frequency: Never     Comment: OCCASIONAL ETOH    Drug use: No     Types: Prescription    Sexual activity: Not Currently   Other Topics Concern       Review of Systems   Constitutional: Positive for malaise/fatigue. Eyes: Negative for blurred vision, double vision and photophobia. Respiratory: Negative for shortness of breath and wheezing. Cardiovascular: Negative for chest pain and palpitations. Musculoskeletal: Positive for falls. Negative for back pain and joint pain. Neurological: Positive for tingling, tremors, sensory change, speech change and weakness. Negative for dizziness, focal weakness and headaches. Psychiatric/Behavioral: Positive for memory loss. Remainder of comprehensive review is negative. Physical Exam :    Visit Vitals  /68   Pulse (!) 59   Temp 97.9 °F (36.6 °C)   Resp 20   Wt 83.9 kg (184 lb 14.4 oz)   SpO2 100%   BMI 25.08 kg/m²     General: Well defined, nourished, and groomed individual in no acute distress.    Neck: Supple, nontender, no bruits, no pain with resistance to active range of motion.    Musculoskeletal: Extremities revealed no edema and had full range of motion of joints.    Psych: Good mood and bright affect     NEUROLOGICAL EXAMINATION:    Mental Status: Alert and oriented to person, place, and time     Cranial Nerves:    II, III, IV, VI: Visual acuity grossly intact. Visual fields are normal.    Pupils are equal, round, and reactive to light and accommodation.    Extra-ocular movements are full and fluid. Fundoscopic exam was benign, no ptosis or nystagmus.    V-XII: Hearing is grossly intact.  Facial features are symmetric, with normal sensation and strength. The palate rises symmetrically and the tongue protrudes midline. Sternocleidomastoids 5/5.      Motor Examination: Normal tone, bulk, and strength, 3/5 muscle strength throughout.      Coordination: moderate LUE resting tremor and LLE resting tremor. RUE resting tremor mild      Gait and Station: stooped posture, shuffled gait, 5 step turn around. No arm swing, bilateral UE tremor with movement.      Reflexes: DTRs 1+ throughout.     Neurosensory Exam:  Stocking glove sensory loss to temperature, vibration, and pinprick to the thighs      Results for orders placed or performed during the hospital encounter of 09/16/19   URINE CULTURE HOLD SAMPLE    Specimen: Serum; Urine   Result Value Ref Range    Urine culture hold        URINE ON HOLD IN MICROBIOLOGY DEPT FOR 3 DAYS. IF UNPRESERVED URINE IS SUBMITTED, IT CANNOT BE USED FOR ADDITIONAL TESTING AFTER 24 HRS, RECOLLECTION WILL BE REQUIRED. CULTURE, URINE    Specimen: Clean catch; Urine   Result Value Ref Range    Special Requests: NO SPECIAL REQUESTS      [place] Count >100,000  COLONIES/mL        Culture result: ENTEROCOCCUS FAECALIS GROUP D (A)         Susceptibility    Enterococcus  faecalis group D - STEVE     Ampicillin ($) <=2 Susceptible ug/mL     Daptomycin ($$$$$) 1 Susceptible ug/mL     Linezolid ($$$$$) 2 Susceptible ug/mL     Nitrofurantoin <=32 Susceptible ug/mL     Penicillin G ($$) 2 Susceptible ug/mL     Vancomycin ($) 2 Susceptible ug/mL     Levofloxacin ($) <=1 Susceptible ug/mL     Ciprofloxacin ($) <=1 Susceptible ug/mL     Tetracycline >8 Resistant ug/mL   CBC WITH AUTOMATED DIFF   Result Value Ref Range    WBC 10.5 4.1 - 11.1 K/uL    RBC 5.30 4. 10 - 5.70 M/uL    HGB 15.7 12.1 - 17.0 g/dL    HCT 45.6 36.6 - 50.3 %    MCV 86.0 80.0 - 99.0 FL    MCH 29.6 26.0 - 34.0 PG    MCHC 34.4 30.0 - 36.5 g/dL    RDW 12.4 11.5 - 14.5 %    PLATELET 017 667 - 683 K/uL    MPV 10.4 8.9 - 12.9 FL    NEUTROPHILS 80 (H) 32 - 75 %    LYMPHOCYTES 12 12 - 49 %    MONOCYTES 7 5 - 13 %    EOSINOPHILS 1 0 - 7 %    BASOPHILS 0 0 - 1 %    ABS. NEUTROPHILS 8.5 (H) 1.8 - 8.0 K/UL    ABS. LYMPHOCYTES 1.2 K/UL    ABS. MONOCYTES 0.7 0.0 - 1.0 K/UL    ABS. EOSINOPHILS 0.1 0.0 - 0.4 K/UL    ABS. BASOPHILS 0.0 0.0 - 0.1 K/UL    DF AUTOMATED     METABOLIC PANEL, COMPREHENSIVE   Result Value Ref Range    Sodium 141 136 - 145 mmol/L    Potassium 3.7 3.5 - 5.1 mmol/L    Chloride 105 97 - 108 mmol/L    CO2 29 21 - 32 mmol/L    Anion gap 7 5 - 15 mmol/L    Glucose 118 (H) 65 - 100 mg/dL    BUN 13 6 - 20 MG/DL    Creatinine 1.23 0.70 - 1.30 MG/DL    BUN/Creatinine ratio 11 (L) 12 - 20      GFR est AA >60 >60 ml/min/1.73m2    GFR est non-AA 58 (L) >60 ml/min/1.73m2    Calcium 8.5 8.5 - 10.1 MG/DL    Bilirubin, total 0.5 0.2 - 1.0 MG/DL    ALT (SGPT) 22 12 - 78 U/L    AST (SGOT) 19 15 - 37 U/L    Alk. phosphatase 56 45 - 117 U/L    Protein, total 6.2 (L) 6.4 - 8.2 g/dL    Albumin 3.3 (L) 3.5 - 5.0 g/dL    Globulin 2.9 2.0 - 4.0 g/dL    A-G Ratio 1.1 1.1 - 2.2     CK W/ REFLX CKMB   Result Value Ref Range     39 - 308 U/L   URINALYSIS W/MICROSCOPIC   Result Value Ref Range    Color YELLOW/STRAW      Appearance HAZY (A) CLEAR      Specific gravity 1.010 1.003 - 1.030      pH (UA) 6.0 5.0 - 8.0      Protein NEGATIVE  NEG mg/dL    Glucose NEGATIVE  NEG mg/dL    Ketone NEGATIVE  NEG mg/dL    Bilirubin NEGATIVE  NEG      Blood TRACE (A) NEG      Urobilinogen 1.0 0.2 - 1.0 EU/dL    Nitrites NEGATIVE  NEG      Leukocyte Esterase MODERATE (A) NEG      WBC 20-50 0 - 4 /hpf    RBC 0-5 0 - 5 /hpf    Epithelial cells FEW FEW /lpf    Bacteria 1+ (A) NEG /hpf    Mucus TRACE (A) NEG /lpf   MAGNESIUM   Result Value Ref Range    Magnesium 2.1 1.6 - 2.4 mg/dL   NT-PRO BNP   Result Value Ref Range    NT pro- (H) 0 - 125 PG/ML   TROPONIN I   Result Value Ref Range    Troponin-I, Qt. <0.05 <0.05 ng/mL   EKG, 12 LEAD, INITIAL   Result Value Ref Range     Ventricular Rate 92 BPM    Atrial Rate 92 BPM    QRS Duration 104 ms    Q-T Interval 362 ms    QTC Calculation (Bezet) 447 ms    Calculated R Axis -50 degrees    Calculated T Axis 74 degrees    Diagnosis       Accelerated Junctional rhythm  Left anterior fascicular block  Moderate voltage criteria for LVH, may be normal variant  Cannot rule out Septal infarct (cited on or before 16-SEP-2019)  Abnormal ECG  When compared with ECG of 03-APR-2013 23:50,  Junctional rhythm has replaced Sinus rhythm  Vent. rate has increased BY  38 BPM  Questionable change in initial forces of Septal leads  QT has lengthened  Confirmed by Zara Solomon MD., Vibha Ronan ([id number]) on 9/17/2019 6:05:35 PM         Orders Placed This Encounter    REFERRAL TO NEUROLOGY     Referral Priority:   Routine     Referral Type:   Consultation     Referral Reason:   Specialty Services Required     Referred to Provider:   Anita Hall MD     Number of Visits Requested:   1    amantadine HCL (SYMMETREL) 100 mg capsule     Sig: Take 1 Cap by mouth two (2) times a day. Dispense:  60 Cap     Refill:  5       1. Parkinson's disease ([facility] [address]    2. Dementia       Discussed with Dr. Velvet King. Try Amantadine 100 mg BID for symptoms. Discussed side effects. If anything gets worse stop and call  Discussed CT and EMG again. Will be re-testing neuropsych in April to evaluate dementia further. Keep brain active. Consider in home PT and they will call if needed. Refer to Dr. Ike Mitchell for 2nd opinion in movement.              This note will not be viewable in [facility]

## 2021-04-12 ENCOUNTER — TELEPHONE (OUTPATIENT)
Dept: NEUROLOGY | Age: 71
End: 2021-04-12

## 2021-04-12 NOTE — TELEPHONE ENCOUNTER
----- Message from Domonique Haynes sent at 4/12/2021  9:58 AM EDT -----  Regarding: HOWIE Medrano/ telephone    Caller's first and last name: dami michaels wife   Reason for call: medical update  Callback required yes/no and why: yes   Best contact number(s): [phone]  Details to clarify the request: the new medication amantadine HCL (SYMMETREL) 100 mg capsule - he was put on is not working, and she had to set up an appointment with a tremor doctor and they cant see him until July she wanted to know if we can call and advise it to be sooner

## 2021-04-19 NOTE — TELEPHONE ENCOUNTER
----- Message from Amadna Munguia sent at 4/19/2021 11:38 AM EDT -----  Regarding: [facility]/Telephone  General Message/Vendor Calls    Caller's first and last name:   Sharron Paul, Wife      Reason for call:  New Rx \"Amantadine HCL\" prescribed at last visit on 03/25/21) for tremors is not working. Mrs. Wong Breen stated, Dr. Bharati Whiting, seen last Thurs, 04/15/21 is recommending pt stop taking this Rx. Callback required yes/no and why:  Yes, left message last Monday, requesting a call back; no response to date      Best contact number(s):[phone]  - Can leave a  voice message.       Details to clarify the request:   Mrs. Wong Breen, stated pt's both hands are shaking      Amanda Munguia

## 2021-06-04 ENCOUNTER — OFFICE VISIT (OUTPATIENT)
Dept: NEUROLOGY | Age: 71
End: 2021-06-04
Payer: MEDICARE

## 2021-06-04 DIAGNOSIS — F02.80 EARLY ONSET ALZHEIMER'S DEMENTIA WITHOUT BEHAVIORAL DISTURBANCE (HCC): Primary | ICD-10-CM

## 2021-06-04 DIAGNOSIS — G30.0 EARLY ONSET ALZHEIMER'S DEMENTIA WITHOUT BEHAVIORAL DISTURBANCE (HCC): Primary | ICD-10-CM

## 2021-06-04 DIAGNOSIS — F41.9 ANXIETY AND DEPRESSION: ICD-10-CM

## 2021-06-04 DIAGNOSIS — F32.A ANXIETY AND DEPRESSION: ICD-10-CM

## 2021-06-04 PROCEDURE — 96139 PSYCL/NRPSYC TST TECH EA: CPT | Performed by: CLINICAL NEUROPSYCHOLOGIST

## 2021-06-04 PROCEDURE — 96136 PSYCL/NRPSYC TST PHY/QHP 1ST: CPT | Performed by: CLINICAL NEUROPSYCHOLOGIST

## 2021-06-04 PROCEDURE — 96138 PSYCL/NRPSYC TECH 1ST: CPT | Performed by: CLINICAL NEUROPSYCHOLOGIST

## 2021-06-04 PROCEDURE — 96132 NRPSYC TST EVAL PHYS/QHP 1ST: CPT | Performed by: CLINICAL NEUROPSYCHOLOGIST

## 2021-06-04 PROCEDURE — 96133 NRPSYC TST EVAL PHYS/QHP EA: CPT | Performed by: CLINICAL NEUROPSYCHOLOGIST

## 2021-06-04 PROCEDURE — 96137 PSYCL/NRPSYC TST PHY/QHP EA: CPT | Performed by: CLINICAL NEUROPSYCHOLOGIST

## 2021-06-04 NOTE — LETTER
6/8/2021 Patient: Giuseppe Salas YOB: 1950 Date of Visit: 6/4/2021 Gerda Oviedo MD 
[address] [facility] [place] [phone] [address] Fax: [phone] Dear Gerda Oviedo MD, Thank you for referring Mr. Michelle Florez to [facility] for evaluation. My notes for this consultation are attached. If you have questions, please do not hesitate to call me. I look forward to following your patient along with you. Sincerely, Kennedy Homans, PsyD

## 2021-06-08 NOTE — PROGRESS NOTES
[address] [facility],5Th Floor  [address]   [address] [place] [address] [facility] [place] [address] [address]   [phone] Office   [phone] Fax      Neuropsychological Evaluation Report      Exam # 2    Referral:  Ken Parisi MD    Vandana Pollard is a 65 y.o. right handed   male who was accompanied by his spouse to the initial clinical interview on 4/30/20. Please refer to his medical records for details pertaining to his history. At the start of the appointment, I reviewed the patient's [facility] Epic Chart (including Media scanned in from previous providers) for the active Problem List, all pertinent Past Medical Hx, medications, recent radiologic and laboratory findings. In addition, I reviewed pt's documented Immunization Record and Encounter History. When I saw him last:    Vandana Pollard is a 77 y.o. right handed   male who was accompanied by his spouse to the initial clinical interview on 2/1/19 . Please refer to his medical records for details pertaining to his history.   When I saw him last:     Carole Phan a 69 y.o. right handed   male who was accompanied by his spouse to the initial clinical interview on 5/10/18. Fanta Hill refer to his medical records for details pertaining to his history.  Briefly, the patient reported that he completed the 12th grade and when he was in school he failed 4th and 7th grade and had to repeat those. Foleyjeremías Valentine graduated with a standard diploma. Darius Valentine is retired, and worked in [employer] [address] has had problems with progressive short term memory loss. Darius Valentine also has migraines, tremor, visual problems, and mood changes. Annalee Ball is worried about dementia. Eliane Worley is no known history of dementia.  No background stroke, meningitis/encephalitis, JAMIE Fever, Lupus, Lyme, TBI, sz.  Wife says he does not remember driving routes as well as he did previously. Darius Valentine can't concentrate.  Can't multitask.  Is more disorganized.  Loses words sometimes.  Has trouble remembering what he did earlier in the day or what he ate for breakfast. Things have gotten worse over the past few months. H He has a history of anxiety and depression and gets very anxious when he notices the memory problems.  Pain related to headaches is significant.  Gets worse - vision.  Resting tremor.  He takes his own medications and has no difficulties with his ADLs from a cognitive standpoint.  Family reports that his half brother has dementia.  Spouse notes that he loses things like his glasses.  He has two dogs with sound shocks and will lay the collars down and can't find them initially.  Misplaces things.  Anxiety worse and not benefiting from medication.  No legal issues currently.  Spouse corroborates.  Spouse does all the cooking.       No previous neuropsych.         This patient generated a mixed normal/abnormal  range Neuropsychological Evaluation with respect to neurocognitive functioning.  In this regard, he is showing marked impairment with auditory learning and memory and mild impairments with visual attention and verbal fluency.  Otherwise, his confrontation naming, working memory, processing speed, perceptual reasoning, verbal comprehension, executive functioning, and bilateral fine motor dexterity were normal.  Vision issues and medication changes are not likely the basis for his generated combination of neurocognitive strengths and weaknesses.  From an emotional standpoint, there is support for both anxiety and depression.  The anxiety appears to be a combination of organic and functional factors and the depression appears mostly functional. [facility] has recently been started on a different medication for anxiety.                   In my opinion, this profile is consistent with an evolving organic process that is currently at a mild level of severity.  Mood issues exacerbate, but currently the profile is not purely consistent with pseudodementia.  Early stage AD is likely.  In addition to continued medical care, my recommendations include consideration for memory management medication. Consider also treatment for attention if not medically contraindicated (the problem is mild).  I also recommend continued treatment for anxiety and counseling to assist with adjustment related depression and self-esteem/self-concept issues.  .  The patient should be encouraged to remain as mentally, socially, and physically active as possible.                   The patient's generated cognitive difficulties are significant to the degree whereby it is my opinion that he should not live independently without appropriate supervision for those domains with a heavy memory emphasis.  This includes medication management supervision and supervision of financial dealings. I am not overly concerned about driving but using a GPS may be wise when he is driving alone.  The problem has been caught early on, and I hope he recognizes this as positive. I am not currently concerned about competency. Edilson Salinas now established.  Follow up one year or  prn. Silvestre Zaman correlation is, of course, indicated.                 I will discuss these findings with the patient and family when they follow up with me in the near future.  A follow up Neuropsychological Evaluation is indicated on a prn basis.       DIAGNOSES: Dementia - Mild                          Anxiety - Mild to moderate                          Depression - Mild         I did not test him in 2019, because he seemed to be doing pretty stable. They are here now for six month re-evaluation from last time. He is on carvidopa now. His cognitive functioning seems to be doing pretty well, though he had some bouts of confusion and had a CT done below which was normal.  He has an appointment on Friday with movement disorders clinic.   The patient does note that he loses track of what he's trying t say, No changes since I last saw him. He has not noticed any changes in his memory but losing words . Things are about the same. No problems with driving. Spouse assists with medications, finances, day-to-day chores, etc.  He continues to struggle with anxiety, and is on fluoxetine 10 mg for anxiety and depression. He gets anxious. No new acute or focal issues. No new medical issues. No new stroke, head injury, seizures, meningitis and such. He did have tremor and hand shaking worse. Came off the carvidopa now? much. No other major psychiatric concerns. No other major medical issues. Sounds like he is doing well cognitively, as can be expected, which is good news. Diagnostics since last seen include: The right CCA is patent. There is minimal stenosis in the right ICA. There is intimal thickening present in the right ICA. The right ECA is patent. The right vertebral is antegrade. Left Carotid     The left CCA is patent. There is minimal stenosis in the left ICA. There is intimal thickening present in the left ICA. The left ECA is patent. The left vertebral is antegrade. EXAM: CT HEAD WO CONT     INDICATION: increased confusion     COMPARISON: 7/10/2017.     CONTRAST: None.     TECHNIQUE: Unenhanced CT of the head was performed using 5 mm images. Brain and  bone windows were generated. CT dose reduction was achieved through use of a  standardized protocol tailored for this examination and automatic exposure  control for dose modulation. Adaptive statistical iterative reconstruction  (ASIR) was utilized.     FINDINGS:  The ventricles and sulci are normal in size, shape and configuration and  midline. There is no significant white matter disease. There is no intracranial  hemorrhage, extra-axial collection, mass, mass effect or midline shift. The  basilar cisterns are open. No acute infarct is identified. The bone windows  demonstrate no abnormalities.  The visualized portions of the paranasal sinuses  and mastoid air cells are clear.     IMPRESSION  IMPRESSION: No acute process or change compared to the prior exam.    Karen Chaudhry MD     2/20/2020  5:44 PM    [address] [place] [address]      Electroencephalogram    Procedure ID: [id number] Procedure Date: 02/20/2020   Patient Name: Carolyn Cheney YOB: 1950   Procedure Type: Routine Medical Record No: [id number]     INDICATION: tremors, dementia    Medications:  Current Outpatient Medications   Medication Sig    donepeziL (ARICEPT) 10 mg tablet TAKE 1 TABLET BY MOUTH   EVERYDAY AT BEDTIME    FLUoxetine (PROZAC) 10 mg tablet TAKE 1 TABLET BY MOUTH EVERY   DAY    levothyroxine (SYNTHROID) 112 mcg tablet     carbidopa-levodopa (SINEMET)  mg per tablet Take 1 Tab by   mouth three (3) times daily.  fludrocortisone (FLORINEF) 0.1 mg tablet Take 0.1 mg by mouth   daily.  finasteride (PROSCAR) 5 mg tablet Take 5 mg by mouth daily.  melatonin 3 mg tablet Take  by mouth nightly.  tamsulosin (FLOMAX) 0.4 mg capsule Take 0.4 mg by mouth daily.  aspirin delayed-release 81 mg tablet Take 325 mg by mouth   daily. No current facility-administered medications for this encounter.          DESCRIPTION OF PROCEDURE: Electrodes were applied in accordance   with the international 10-20 system of electrode placement.  EEG   was reviewed in both bipolar and referential montages    Description of Activity:  During wakefulness, there is continuous runs of 8 Hz symmetric   low voltage posterior alpha rhythm, that attenuate symmetrically   with eye opening. Low voltage beta activity occurs symmetrically   at the anterior head regions bilaterally. During drowsiness, there is attenuation of the alpha rhythm and   low voltage theta activity occurs bilaterally.      Intermittent photic stimulation was performed and did not induce   posterior driving responses.      No sharp or spike discharges, seizures or epileptiform discharges   seen. No focal asymmetry. Clinical Interpretation: This EEG, performed during wakefulness and drowsiness is normal.   There is no focal asymmetry, seizures or epileptiform discharges   seen.              Neuropsychological Mental Status Exam (NMSE):      Historian: Good  Praxis: No UE apraxia  R/L Orientation: Intact to self and to other  Dress: within normal limits   Weight: within normal limits   Appearance/Hygiene: within normal limits   Gait: within normal limits   Assistive Devices: None  Mood: within normal limits   Affect: within normal limits   Comprehension: within normal limits   Thought Process: within normal limits   Expressive Language: Mild articulation problems    Receptive Language: within normal limits   Motor:  No cognitive perseveration. Shaking on the left side. ETOH: Very rarely  Tobacco: Denied  Illicit: Denied  SI/HI: Denied  Psychosis: Denied  Insight: Within normal limits  Judgment: Within normal limits  Other Psych:      Past Medical History:   Diagnosis Date    Anxiety     Aortic insufficiency regurgitation 11/9/2010    Arthritis     neck    Atrial flutter (HCC)     cardioversion 5/18/11    BPH (benign prostatic hyperplasia)     Hypothyroid     Migraines     mobitz 1, atrioventricular block 11/9/2010       Past Surgical History:   Procedure Laterality Date    ECHO 2D ADULT  9/2010    normal LV wall motion and ejection fraction, left ventricular enlargement, left atrial enlargement, severe aortic regurgitation and mild mitral regurgitation. The ejection fraction was 55-60%.  ECHO 2D ADULT  2/2011    mild LVE, EF 58%, LAE, large Ao root, Severe AI, 2+MR    ECHO 2D ADULT  6/2011    LVH, EF 60%, trace -mild MR    HX CATARACT REMOVAL      RIGHT EYE    HX GI      inginal hernia repair 2008    HX HEART CATHETERIZATION  1987    normal coronary arteries normal left ventricular function.      HX HEART CATHETERIZATION  2011    Normal coronaries    HX HEART VALVE SURGERY  4/2011    AVR, #[address] [place] Epic porcine    HX HERNIA REPAIR      HX PACEMAKER  1987    VVI, after stopping \"voltarin\" his block resolved. Subsequently battery depleted in 2002 and was not replaced       Allergies   Allergen Reactions    Gantrisin Rash       Family History   Problem Relation Age of Onset    Alcohol abuse Mother     Lung Disease Mother         EMPHYSEMA    Cancer Mother         LUNG CA    Stroke Mother     Heart Disease Father         MI       Social History     Tobacco Use    Smoking status: Never Smoker    Smokeless tobacco: Never Used   Substance Use Topics    Alcohol use: No     Comment: OCCASIONAL ETOH    Drug use: No     Types: Prescription       Current Outpatient Medications   Medication Sig Dispense Refill    amantadine HCL (SYMMETREL) 100 mg capsule Take 1 Cap by mouth two (2) times a day. 60 Cap 5    carbidopa-levodopa (SINEMET)  mg per tablet TAKE 1 TABLET BY MOUTH 5 TIMES A  Tab 1    donepeziL (ARICEPT) 10 mg tablet TAKE 1 TABLET BY MOUTH EVERYDAY AT BEDTIME      FLUoxetine (PROZAC) 10 mg tablet TAKE 1 TABLET BY MOUTH EVERY DAY      levothyroxine (SYNTHROID) 112 mcg tablet       fludrocortisone (FLORINEF) 0.1 mg tablet Take 0.1 mg by mouth daily.  finasteride (PROSCAR) 5 mg tablet Take 5 mg by mouth daily.  melatonin 3 mg tablet Take  by mouth nightly.  tamsulosin (FLOMAX) 0.4 mg capsule Take 0.4 mg by mouth two (2) times a day. 3    aspirin delayed-release 81 mg tablet Take 325 mg by mouth daily. Plan:  Obtain authorization for testing from insurance company. Report to follow once testing, scoring, and interpretation completed. ? Organic based neurocognitive issues versus mood disorder or combination of same. ? Problems organic, functional, or both? This note will not be viewable in [address]     Neuropsychological Test Results  Patient Testing 6/4//21 Report Completed 6/8/21  A Psychometrist Assisted w/ portions of this evaluation while under my direct  supervision    The following evaluation procedures/tests were administered:      Neuropsychologist Administered/Interpreted:  Neuropsychological Mental Status Exam, Revised Memory & Behavior Checklist,  Mini Mental Status Exam, Clock Drawing Test, Test Of Premorbid Functioning, Shaggy-Melzack Pain Questionnaire, History Taking  & Clinical Interview With The Patient, Additional History Taking w/ The Patient's Spouse, ABAS-3, CASE, MARVIN, CPT-III, Review Of Available Records. Psychometrist Administered under Neuropsychologist Supervision & Neuropsychologist Interpreted:  Verbal Fluency Tests, Lucas & Lucas  Revised, Trailmaking Test Parts A & B, Wechsler Adult Intelligence Scale - IV, [place] [employer]  3, Grooved Pegboard, Pacheco Depression Inventory  II, Pacheco Anxiety Inventory. Test Findings:  Test Findings:  Note:  The patients raw data have been compared with currently available norms which include demographic corrections for age, gender, and/or education. Sometimes, the patients scores are compared to demographically similar individuals as close to the patients age, education level, etc., as possible. \"Average\" is viewed as being +/- 1 standard deviation (SD) from the stated mean for a particular test score. \"Low average\" is viewed as being between 1 and 2 SD below the mean, and above average is viewed as being 1 and 2 SD above the mean. Scores falling in the borderline range (between 1-1/2 and 2 SD below the mean) are viewed with particular attention as to whether they are normal or abnormal neurocognitive test scores. Other methods of inference in analyzing the test data are also utilized, including the pattern and range of scores in the profile, bilateral motor functions, and the presence, if any, of pathognomonic signs.         Behaviorally, the patient was friendly and cooperative and appeared motivated to perform well during this examination. Within this context, the results of this evaluation are viewed as a valid reflection of the patients actual neurocognitive and emotional status. His MMSE score of 22/30 correct was impaired. In this regard, he was not oriented to season, month, day, date, or building. Backward spelling was 1/3 correct. Repetition was impaired. Clock drawing was normal.   MMSE score has declined over time. His structured word list fluency, as assessed by the FAS Test, was within the below average range with a T score 41. Category fluency was mildly impaired with a T score of 38. Confrontation naming, as assessed by the [facility], was within the average range with a T score of 48. This pattern of performance is again indicative of a patient who is at mildly increased risk for day-to-day problems with verbal fluency and confrontation naming remains normal.                   The patient was administered the Hossein Continuous Performance Test  III,a computer administered test of sustained attention, and review of the subscales within this instrument revealed mild concerns for inattentiveness without impulsivity. This pattern of performance is indicative of a patient who is at increased risk for day-to-day problems with sustained visual attention/concentration. No change over time.                 The patient is showing problems with working memory capacity (6th %ile) and processing speed (0.2nd  %ile) on the WAIS-IV. His Verbal Comprehension Index score of 83 was low average. His Perceptual Reasoning Index score of 67 was extremely low. These scores reflect a decline in functioning based on an assessment of premorbid functioning.      All scores are lower than when previously tested, though his processing speed and perceptual reasoning have slowed significantly over time.                  The patient was administered the [place] [employer] - 3 and generated an impaired range (and flat) learning curve over five repeated auditory word list learning trials. An interference trial was within the normal range. Free and cued, short and long delayed recall were all impaired. Recognition recall was impaired. Forced choice recall was normal.   This pattern of performance is indicative of a patient who is at increased risk for day-to-day problems with auditory learning and memory. This is a decline in memory over time.                 Simple timed visual motor sequencing (Trailmaking Test Part A) was within the severely impaired range with a T score of 19. His performance on a similar, but more complex task of timed visual motor sequencing (Trailmaking Test Part B) was within the severely impaired range with a T score of 20. This pattern of performance is indicative of a patient who is at increased risk for day-to-day problems with executive functioning. This is a decline in functioning over time.                 Fine motor dexterity was within the below average range for his dominant hand (T = 41), and mildly to moderately impaired for his nondominant hand (T = 32). Neurologic correlation is indicated with respect to possible lateralization. This is a decline in nondominant hand and fine motor dexterity over time. Normal range bilaterally. This pattern of performance is not  indicative of a patient who is at increased risk for day-to-day problems with bilateral motor dexterity.                   The patient rated his current level of pain as \"0/5- No Pain\" on the Shaggy-Melzack Pain Questionnaire.                   His Pacheco Depression Inventory- II score of 11 was within the minimally depressed range. His Pacheco Anxiety Inventory score of 10 reflected mild anxiety. The patient was administered the Personality Assessment Inventory but I discontinued this test due to item comprehension problems.                 The spouse completed the ABAS-3 and did not report clinically significant concerns regarding his general adaptive skills, conceptual skills, social skills, or practical skills. On the DELL she reported concerns regarding the patient's somatic anxiety as well as concern for general anxiety, depression, and cognitive functioning.     Impressions & Recommendations: This is the patient's second evaluation of neurocognitive and psychological status. On first examination, he was showing mild dementia with mild anxiety and depression. Comparison of current with previous neurocognitive test results show a clear decline across a number of neurocognitive domains assessed, especially processing speed, perceptual reasoning, auditory memory, executive functioning, and nondominant handed fine motor dexterity. Neurologic correlation is indicated. He is again showing a mixed normal/Global pattern of performance across all other neurocognitive domains assessed. From an emotional standpoint, he is again reporting concerns related to mild levels of anxiety and depression without concern for additional psychopathology. In my opinion, this profile is consistent with an evolving organic process that  has progressed from a mild level of severity to a mild to moderate level of severity. A mood issues continue to exacerbate, but this is not a pseudodementia. He is on medication for mood and for memory. I suggest a review of his current medication management for memory. Consider active engagement in counseling. The patient should be encouraged to remain as mentally, socially, and physically active as possible.                   The patient's generated cognitive difficulties are significant to the degree whereby it is my opinion that he should not live independently without appropriate supervision for those domains with a heavy memory emphasis. This includes medication management supervision and supervision of financial dealings.   Consider a formal evaluation of driving safety, though I am not yet overly concerned about same. At the present time, I find the patient is lacking capacity for informed medical or financial decisions and suggest that a POA be assigned if this is not been done so already. I agree with the current living arrangement, but as his needs for supervision increase over time, we will increase the level of supervision by considering in-home level care if needed. Respite care for the spouse is advised. We now have baseline and updated neurocognitive data on him. Follow-up yearly. Clinical correlation is, of course, indicated.                   I will discuss these findings with the patient and family when they follow up with me in the near future. A follow up Neuropsychological Evaluation is indicated on a prn basis.       DIAGNOSES: Dementia - Mild To Moderate (progressing slowly)                          Anxiety - Mild (on med)                          Depression - Mild (on med)      The above information is based upon information currently available to me. If there is any additional information of which I am currently unaware, I would be more than happy to review it upon having it made available to me. Thank you for the opportunity to see this interesting individual.     Sincerely,       Brendan Bravo. Fuad Heads, PsyD, EdS      Attachments:  IQ Test Results (In Media Section Of This EMR)    Cc: Camilo Nuñez MD    Time Documentation:    33160*3 84779*5 (60 minutes)    96138 x 1  96139 x 5 Test Administration/Data Gathering By Technician: (3 hours). 31060 x 1 (first 30 minutes), 64890 x 5 (each additional 30 minutes)    96132 x 1  96133 x 1 Testing Evaluation Services by Neuropsychologist (1 hour, 50 minutes) 96132 x 1 (first hour), 96133 x 1 (50 minutes)    Definitions:      68624/28730:  Neurobehavioral Status Exam, Clinical interview.   Clinical assessment of thinking, reasoning and judgment, by neuropsychologist, both face to face time with patient and time interpreting those test results and reporting, first and subsequent hours)    08828/19069: Neuropsychological Test Administration by Technician/Psychometrist, first 30 minutes and each additional 30 minutes. The above includes: Record review. Review of history provided by patient. Review of collaborative information. Testing by Clinician. Review of raw data. Scoring. Report writing of individual tests administered by Clinician. Integration of individual tests administered by psychometrist with NSE/testing by clinician, review of records/history/collaborative information, case Conceptualization, treatment planning, clinical decision making, report writing, coordination Of Care. Psychometry test codes as time spent by psychometrist administering and scoring neurocognitive/psychological tests under supervision of neuropsychologist.  Integral services including scoring of raw data, data interpretation, case conceptualization, report writing etcetera were initiated after the patient finished testing/raw data collected and was completed on the date the report was signed. Please note that this dictation was completed with [employer], the [employer] voice recognition software. Quite often unanticipated grammatical, syntax, homophones, and other interpretive errors are inadvertently transcribed by the computer software. Please disregard these errors. Please excuse any errors that have escaped final proofreading. Thank you.

## 2021-08-10 ENCOUNTER — OFFICE VISIT (OUTPATIENT)
Dept: NEUROLOGY | Age: 71
End: 2021-08-10
Payer: MEDICARE

## 2021-08-10 DIAGNOSIS — F32.A ANXIETY AND DEPRESSION: ICD-10-CM

## 2021-08-10 DIAGNOSIS — F02.80 EARLY ONSET ALZHEIMER'S DEMENTIA WITHOUT BEHAVIORAL DISTURBANCE (HCC): Primary | ICD-10-CM

## 2021-08-10 DIAGNOSIS — G30.0 EARLY ONSET ALZHEIMER'S DEMENTIA WITHOUT BEHAVIORAL DISTURBANCE (HCC): Primary | ICD-10-CM

## 2021-08-10 DIAGNOSIS — F41.9 ANXIETY AND DEPRESSION: ICD-10-CM

## 2021-08-10 PROCEDURE — 90832 PSYTX W PT 30 MINUTES: CPT | Performed by: CLINICAL NEUROPSYCHOLOGIST

## 2021-08-10 NOTE — PROGRESS NOTES
Prior to seeing the patient I reviewed the records, including the previously completed report, the records in [facility], and any updated visits from other providers since I saw the patient last.      Today, I engaged in a psychoeducational and supportive and cognitive/behavioral psychotherapy session with the patient and spouse. I provided psychotherapy in the form of psychoeducation and support with respect to the results of the recent Neuropsychological Evaluation, including discussing individual tests as well as patient's areas of neurocognitive strength versus weakness. We discussed, in detail, the following: This is the patient's second evaluation of neurocognitive and psychological status. On first examination, he was showing mild dementia with mild anxiety and depression. Comparison of current with previous neurocognitive test results show a clear decline across a number of neurocognitive domains assessed, especially processing speed, perceptual reasoning, auditory memory, executive functioning, and nondominant handed fine motor dexterity. Neurologic correlation is indicated. He is again showing a mixed normal/Global pattern of performance across all other neurocognitive domains assessed. From an emotional standpoint, he is again reporting concerns related to mild levels of anxiety and depression without concern for additional psychopathology.                   In my opinion, this profile is consistent with an evolving organic process that  has progressed from a mild level of severity to a mild to moderate level of severity. A mood issues continue to exacerbate, but this is not a pseudodementia. He is on medication for mood and for memory. I suggest a review of his current medication management for memory. Consider active engagement in counseling.   The patient should be encouraged to remain as mentally, socially, and physically active as possible.                   The patient's generated cognitive difficulties are significant to the degree whereby it is my opinion that he should not live independently without appropriate supervision for those domains with a heavy memory emphasis.  This includes medication management supervision and supervision of financial dealings. Consider a formal evaluation of driving safety, though I am not yet overly concerned about same. At the present time, I find the patient is lacking capacity for informed medical or financial decisions and suggest that a POA be assigned if this is not been done so already. I agree with the current living arrangement, but as his needs for supervision increase over time, we will increase the level of supervision by considering in-home level care if needed. Respite care for the spouse is advised. We now have baseline and updated neurocognitive data on him. Follow-up yearly. Clinical correlation is, of course, indicated.                   I will discuss these findings with the patient and family when they follow up with me in the near future.  A follow up Neuropsychological Evaluation is indicated on a prn basis.       DIAGNOSES: Dementia - Mild To Moderate (progressing slowly)                          Anxiety - Mild (on med)                          Depression - Mild (on med)         Education was provided regarding my diagnostic impressions, and we discussed treatment plan/options. I also answered numerous questions related to the clinical findings, including discussing various methods to improve cognition and mood. Counseling provided regarding mood and cognition. CBT and supportive psychotherapy techniques were utilized. Supportive/Cognitive Behavioral/Solution Focused psychotherapy provided  Discussed rational versus irrational thinking patterns and their consequences. Discussed healthy/adaptive and unhealthy/maladaptive coping. The patient needs to follow with [facility], PCP, psych as needed.   Consult with [facility] if needed, consider stimulator perhaps  ? That's outside my purview obviously, and he can't have a MRI due to pacemaker.       The patient had the following concerns which I deferred to their referring provider: med for cognition, med for mood      Time spent today:  20

## 2022-09-02 ENCOUNTER — APPOINTMENT (OUTPATIENT)
Dept: CT IMAGING | Age: 72
End: 2022-09-02
Attending: EMERGENCY MEDICINE
Payer: MEDICARE

## 2022-09-02 ENCOUNTER — HOSPITAL ENCOUNTER (EMERGENCY)
Age: 72
Discharge: HOME | End: 2022-09-02
Attending: EMERGENCY MEDICINE
Payer: MEDICARE

## 2022-09-02 VITALS
BODY MASS INDEX: 25.04 KG/M2 | SYSTOLIC BLOOD PRESSURE: 140 MMHG | WEIGHT: 184.9 LBS | OXYGEN SATURATION: 95 % | HEIGHT: 72 IN | HEART RATE: 71 BPM | TEMPERATURE: 98.2 F | RESPIRATION RATE: 18 BRPM | DIASTOLIC BLOOD PRESSURE: 81 MMHG

## 2022-09-02 DIAGNOSIS — R26.2 DIFFICULTY WALKING: Primary | ICD-10-CM

## 2022-09-02 LAB
ALBUMIN SERPL-MCNC: 3.4 G/DL (ref 3.5–5)
ALBUMIN/GLOB SERPL: 1.3 {RATIO} (ref 1.1–2.2)
ALP SERPL-CCNC: 47 U/L (ref 45–117)
ALT SERPL-CCNC: 7 U/L (ref 12–78)
ANION GAP SERPL CALC-SCNC: 3 MMOL/L (ref 5–15)
APPEARANCE UR: CLEAR
AST SERPL-CCNC: 15 U/L (ref 15–37)
BACTERIA URNS QL MICRO: NEGATIVE /HPF
BASOPHILS # BLD: 0.1 K/UL (ref 0–0.1)
BASOPHILS NFR BLD: 1 % (ref 0–1)
BILIRUB SERPL-MCNC: 1 MG/DL (ref 0.2–1)
BILIRUB UR QL: NEGATIVE
BUN SERPL-MCNC: 18 MG/DL (ref 6–20)
BUN/CREAT SERPL: 16 (ref 12–20)
CALCIUM SERPL-MCNC: 8.4 MG/DL (ref 8.5–10.1)
CHLORIDE SERPL-SCNC: 108 MMOL/L (ref 97–108)
CO2 SERPL-SCNC: 30 MMOL/L (ref 21–32)
COLOR UR: NORMAL
COMMENT, HOLDF: NORMAL
CREAT SERPL-MCNC: 1.12 MG/DL (ref 0.7–1.3)
DIFFERENTIAL METHOD BLD: NORMAL
EOSINOPHIL # BLD: 0.1 K/UL (ref 0–0.4)
EOSINOPHIL NFR BLD: 2 % (ref 0–7)
EPITH CASTS URNS QL MICRO: NORMAL /LPF
ERYTHROCYTE [DISTWIDTH] IN BLOOD BY AUTOMATED COUNT: 12.3 % (ref 11.5–14.5)
GLOBULIN SER CALC-MCNC: 2.6 G/DL (ref 2–4)
GLUCOSE SERPL-MCNC: 108 MG/DL (ref 65–100)
GLUCOSE UR STRIP.AUTO-MCNC: NEGATIVE MG/DL
HCT VFR BLD AUTO: 46.4 % (ref 36.6–50.3)
HGB BLD-MCNC: 15.4 G/DL (ref 12.1–17)
HGB UR QL STRIP: NEGATIVE
HYALINE CASTS URNS QL MICRO: NORMAL /LPF (ref 0–2)
IMM GRANULOCYTES # BLD AUTO: 0 K/UL (ref 0–0.04)
IMM GRANULOCYTES NFR BLD AUTO: 0 % (ref 0–0.5)
KETONES UR QL STRIP.AUTO: NEGATIVE MG/DL
LEUKOCYTE ESTERASE UR QL STRIP.AUTO: NEGATIVE
LYMPHOCYTES # BLD: 1.2 K/UL (ref 0.8–3.5)
LYMPHOCYTES NFR BLD: 19 % (ref 12–49)
MCH RBC QN AUTO: 28.9 PG (ref 26–34)
MCHC RBC AUTO-ENTMCNC: 33.2 G/DL (ref 30–36.5)
MCV RBC AUTO: 87.1 FL (ref 80–99)
MONOCYTES # BLD: 0.5 K/UL (ref 0–1)
MONOCYTES NFR BLD: 7 % (ref 5–13)
NEUTS SEG # BLD: 4.6 K/UL (ref 1.8–8)
NEUTS SEG NFR BLD: 71 % (ref 32–75)
NITRITE UR QL STRIP.AUTO: NEGATIVE
NRBC # BLD: 0 K/UL (ref 0–0.01)
NRBC BLD-RTO: 0 PER 100 WBC
PH UR STRIP: 7 [PH] (ref 5–8)
PLATELET # BLD AUTO: 186 K/UL (ref 150–400)
PMV BLD AUTO: 10.8 FL (ref 8.9–12.9)
POTASSIUM SERPL-SCNC: 3.9 MMOL/L (ref 3.5–5.1)
PROT SERPL-MCNC: 6 G/DL (ref 6.4–8.2)
PROT UR STRIP-MCNC: NEGATIVE MG/DL
RBC # BLD AUTO: 5.33 M/UL (ref 4.1–5.7)
RBC #/AREA URNS HPF: NORMAL /HPF (ref 0–5)
SAMPLES BEING HELD,HOLD: NORMAL
SODIUM SERPL-SCNC: 141 MMOL/L (ref 136–145)
SP GR UR REFRACTOMETRY: 1.01 (ref 1–1.03)
UR CULT HOLD, URHOLD: NORMAL
UROBILINOGEN UR QL STRIP.AUTO: 1 EU/DL (ref 0.2–1)
WBC # BLD AUTO: 6.5 K/UL (ref 4.1–11.1)
WBC URNS QL MICRO: NORMAL /HPF (ref 0–4)

## 2022-09-02 PROCEDURE — 85025 COMPLETE CBC W/AUTO DIFF WBC: CPT

## 2022-09-02 PROCEDURE — 80053 COMPREHEN METABOLIC PANEL: CPT

## 2022-09-02 PROCEDURE — 70450 CT HEAD/BRAIN W/O DYE: CPT

## 2022-09-02 PROCEDURE — 81001 URINALYSIS AUTO W/SCOPE: CPT

## 2022-09-02 PROCEDURE — 99284 EMERGENCY DEPT VISIT MOD MDM: CPT

## 2022-09-02 PROCEDURE — 36415 COLL VENOUS BLD VENIPUNCTURE: CPT

## 2022-09-02 NOTE — ED PROVIDER NOTES
Tom Vazquez is a 67 yo M with h/o Parkinson's dementia, aortic insufficiency, Atrial flutter an migraines with Aura who presents to the ED with changes in vision and difficulty walking. He  reports that the vision changes occurred as he was walking to his car after shopping with his wife. HE suddenly saw a flash and then bleached vision as if he had seen a camera flash. He states this is typical of his Migraine auras except that he has not had a migraine in about 20 years. He states his vision is now back to normal but he has not developed a headache. His wife also notes that when the vision changes occurred he suddenly was not able to start walking and she and another man carried him to the car but once there he was also not able to bend his legs to get in to they sat him on the ground and called 911. She states that he has had 5 similar episodes of difficulty moving his legs over the past 5 months or so. Past Medical History:   Diagnosis Date    Anxiety     Aortic insufficiency regurgitation 11/9/2010    Arthritis     neck    Atrial flutter (HCC)     cardioversion 5/18/11    BPH (benign prostatic hyperplasia)     Hypothyroid     Migraines     mobitz 1, atrioventricular block 11/9/2010       Past Surgical History:   Procedure Laterality Date    ECHO 2D ADULT  9/2010    normal LV wall motion and ejection fraction, left ventricular enlargement, left atrial enlargement, severe aortic regurgitation and mild mitral regurgitation. The ejection fraction was 55-60%.  ECHO 2D ADULT  2/2011    mild LVE, EF 58%, LAE, large Ao root, Severe AI, 2+MR    ECHO 2D ADULT  6/2011    LVH, EF 60%, trace -mild MR    HX CATARACT REMOVAL      RIGHT EYE    HX GI      inginal hernia repair 2008    HX HEART CATHETERIZATION  1987    normal coronary arteries normal left ventricular function.      HX HEART CATHETERIZATION  2011    Normal coronaries    HX HEART VALVE SURGERY  4/2011    AVR, #[address] Epic porcine    HX HERNIA REPAIR      HX PACEMAKER  1987    VVI, after stopping \"voltarin\" his block resolved. Subsequently battery depleted in 2002 and was not replaced         Family History:   Problem Relation Age of Onset    Alcohol abuse Mother     Lung Disease Mother         EMPHYSEMA    Cancer Mother         LUNG CA    Stroke Mother     Heart Disease Father         MI       Social History     Socioeconomic History    Marital status:      Spouse name: Not on file    Number of children: Not on file    Years of education: Not on file    Highest education level: Not on file   Occupational History    Not on file   Tobacco Use    Smoking status: Never    Smokeless tobacco: Never   Substance and Sexual Activity    Alcohol use: No     Comment: OCCASIONAL ETOH    Drug use: No     Types: Prescription    Sexual activity: Not Currently   Other Topics Concern     Service Not Asked    Blood Transfusions Not Asked    Caffeine Concern Not Asked    Occupational Exposure Not Asked    Hobby Hazards Not Asked    Sleep Concern Not Asked    Stress Concern Not Asked    Weight Concern Not Asked    Special Diet Not Asked    Back Care Not Asked    Exercise Not Asked    Bike Helmet Not Asked   [address] Not Asked    Self-Exams Not Asked   Social History Narrative    Not on file     Social Determinants of Health     Financial Resource Strain: Not on file   Food Insecurity: Not on file   Transportation Needs: Not on file   Physical Activity: Not on file   Stress: Not on file   Social Connections: Not on file   Intimate Partner Violence: Not on file   Housing Stability: Not on file         ALLERGIES: Gantrisin    Review of Systems   Constitutional:  Positive for fatigue. Negative for fever. HENT:  Negative for sore throat. Eyes:  Positive for visual disturbance. Respiratory:  Negative for cough. Cardiovascular:  Negative for chest pain. Gastrointestinal:  Negative for abdominal pain. Genitourinary:  Negative for dysuria. Musculoskeletal:  Negative for back pain. Skin:  Negative for rash. Neurological:  Positive for tremors. Negative for headaches. Difficulty walking     Vitals:    09/02/22 1055   BP: (!) 140/81   Pulse: 71   Resp: 18   Temp: 98.2 °F (36.8 °C)   SpO2: 96%   Weight: 83.9 kg (184 lb 14.4 oz)   Height: 6' (1.829 m)            Physical Exam  Vitals and nursing note reviewed. Constitutional:       General: He is not in acute distress. Appearance: He is well-developed. HENT:      Head: Normocephalic and atraumatic. Mouth/Throat:      Mouth: Mucous membranes are moist.   Eyes:      Extraocular Movements: Extraocular movements intact. Conjunctiva/sclera: Conjunctivae normal.   Neck:      Trachea: Phonation normal.   Cardiovascular:      Rate and Rhythm: Normal rate. Pulmonary:      Effort: Pulmonary effort is normal. No respiratory distress. Abdominal:      General: There is no distension. Musculoskeletal:         General: No tenderness. Normal range of motion. Cervical back: Normal range of motion. Skin:     General: Skin is warm and dry. Neurological:      General: No focal deficit present. Mental Status: He is alert. He is not disoriented. Cranial Nerves: No facial asymmetry. Sensory: Sensation is intact. Motor: Tremor present. No abnormal muscle tone or pronator drift. MDM        2:29 PM  Patient able to ambulate with assistance. LAbs and CT with no acute findings. Will discharge home.   PAtient to follow-up with his neurologist.     Procedures

## 2022-09-02 NOTE — ED NOTES
Patient discharged by provider. D/C instructions given. Patient educated to take all medications as instructed for management at home. Patien verbalized understanding, verbalized no questions. PIV removed, pressure dressing applied. Patient ambulated out of ER without difficulty, NAD.

## 2022-09-02 NOTE — ED TRIAGE NOTES
Pt arrives via EMS w/ c/c of migraine w/ aura where he reports he lost vision in both eyes while walking to his car today. Pt reports visual change has resolved & denies HA @ this time as well. Pt has hx of migraine w/ aura, Parkinson's dementia.

## 2022-11-02 ENCOUNTER — OFFICE VISIT (OUTPATIENT)
Dept: NEUROLOGY | Age: 72
End: 2022-11-02
Payer: MEDICARE

## 2022-11-02 DIAGNOSIS — F02.80 EARLY ONSET ALZHEIMER'S DEMENTIA WITHOUT BEHAVIORAL DISTURBANCE (HCC): Primary | ICD-10-CM

## 2022-11-02 DIAGNOSIS — G20 PARKINSON'S DISEASE (HCC): ICD-10-CM

## 2022-11-02 DIAGNOSIS — G30.0 EARLY ONSET ALZHEIMER'S DEMENTIA WITHOUT BEHAVIORAL DISTURBANCE (HCC): Primary | ICD-10-CM

## 2022-11-02 DIAGNOSIS — F41.9 ANXIETY AND DEPRESSION: ICD-10-CM

## 2022-11-02 DIAGNOSIS — F32.A ANXIETY AND DEPRESSION: ICD-10-CM

## 2022-11-02 DIAGNOSIS — R44.1 VISUAL HALLUCINATION: ICD-10-CM

## 2022-11-02 DIAGNOSIS — R29.898 LEFT ARM WEAKNESS: ICD-10-CM

## 2022-11-02 PROCEDURE — 1123F ACP DISCUSS/DSCN MKR DOCD: CPT | Performed by: CLINICAL NEUROPSYCHOLOGIST

## 2022-11-02 PROCEDURE — 90791 PSYCH DIAGNOSTIC EVALUATION: CPT | Performed by: CLINICAL NEUROPSYCHOLOGIST

## 2022-11-02 NOTE — PROGRESS NOTES
[address] [facility],[address]  [address]   [address] [place] [address] [facility]    [phone] Office   [phone] Fax      Neuropsychology    Exam # 3    Initial Diagnostic Interview Note      Referral:  Catarino Martin MD    Laila Campos is a 74 y.o. right handed   male who was accompanied by his spouse to the initial clinical interview on . Please refer to his medical records for details pertaining to his history. At the start of the appointment, I reviewed the patient's [facility] Epic Chart (including Media scanned in from previous providers) for the active Problem List, all pertinent Past Medical Hx, medications, recent radiologic and laboratory findings. In addition, I reviewed pt's documented Immunization Record and Encounter History. When I saw him:    Laila Campos is a 65 y.o. right handed   male who was accompanied by his spouse to the initial clinical interview on 4/30/20. Please refer to his medical records for details pertaining to his history. At the start of the appointment, I reviewed the patient's [facility] Epic Chart (including Media scanned in from previous providers) for the active Problem List, all pertinent Past Medical Hx, medications, recent radiologic and laboratory findings. In addition, I reviewed pt's documented Immunization Record and Encounter History. When I saw him last:     Laila Campos is a 77 y.o. right handed   male who was accompanied by his spouse to the initial clinical interview on 2/1/19 . Please refer to his medical records for details pertaining to his history. When I saw him last:     Laila Campos is a 69 y.o. right handed   male who was accompanied by his spouse to the initial clinical interview on 5/10/18. Please refer to his medical records for details pertaining to his history. Briefly, the patient reported that he completed the 12th grade and when he was in school he failed 4th and 7th grade and had to repeat those. He graduated with a standard diploma. He is retired, and worked in maintenance. He has had problems with progressive short term memory loss. He also has migraines, tremor, visual problems, and mood changes. He is worried about dementia. There is no known history of dementia. No background stroke, meningitis/encephalitis, JAMIE Fever, Lupus, Lyme, TBI, sz. Wife says he does not remember driving routes as well as he did previously. He can't concentrate. Can't multitask. Is more disorganized. Loses words sometimes. Has trouble remembering what he did earlier in the day or what he ate for breakfast. Things have gotten worse over the past few months. H He has a history of anxiety and depression and gets very anxious when he notices the memory problems. Pain related to headaches is significant. Gets worse - vision. Resting tremor. He takes his own medications and has no difficulties with his ADLs from a cognitive standpoint. Family reports that his half brother has dementia. Spouse notes that he loses things like his glasses. He has two dogs with sound shocks and will lay the collars down and can't find them initially. Misplaces things. Anxiety worse and not benefiting from medication. No legal issues currently. Spouse corroborates. Spouse does all the cooking. No previous neuropsych. This patient generated a mixed normal/abnormal  range Neuropsychological Evaluation with respect to neurocognitive functioning. In this regard, he is showing marked impairment with auditory learning and memory and mild impairments with visual attention and verbal fluency.   Otherwise, his confrontation naming, working memory, processing speed, perceptual reasoning, verbal comprehension, executive functioning, and bilateral fine motor dexterity were normal.  Vision issues and medication changes are not likely the basis for his generated combination of neurocognitive strengths and weaknesses. From an emotional standpoint, there is support for both anxiety and depression. The anxiety appears to be a combination of organic and functional factors and the depression appears mostly functional.  He has recently been started on a different medication for anxiety. In my opinion, this profile is consistent with an evolving organic process that is currently at a mild level of severity. Mood issues exacerbate, but currently the profile is not purely consistent with pseudodementia. Early stage AD is likely. In addition to continued medical care, my recommendations include consideration for memory management medication. Consider also treatment for attention if not medically contraindicated (the problem is mild). I also recommend continued treatment for anxiety and counseling to assist with adjustment related depression and self-esteem/self-concept issues. .  The patient should be encouraged to remain as mentally, socially, and physically active as possible. The patient's generated cognitive difficulties are significant to the degree whereby it is my opinion that he should not live independently without appropriate supervision for those domains with a heavy memory emphasis. This includes medication management supervision and supervision of financial dealings. I am not overly concerned about driving but using a GPS may be wise when he is driving alone. The problem has been caught early on, and I hope he recognizes this as positive. I am not currently concerned about competency. Baseline now established. Follow up one year or  prn. Clinical correlation is, of course, indicated. I will discuss these findings with the patient and family when they follow up with me in the near future.   A follow up Neuropsychological Evaluation is indicated on a prn basis. DIAGNOSES: Dementia - Mild                          Anxiety - Mild to moderate                          Depression - Mild           I did not test him in 2019, because he seemed to be doing pretty stable. They are here now for six month re-evaluation from last time. He is on carvidopa now. His cognitive functioning seems to be doing pretty well, though he had some bouts of confusion and had a CT done below which was normal.  He has an appointment on Friday with movement disorders clinic. The patient does note that he loses track of what he's trying t say, No changes since I last saw him. He has not noticed any changes in his memory but losing words . Things are about the same. No problems with driving. Spouse assists with medications, finances, day-to-day chores, etc.  He continues to struggle with anxiety, and is on fluoxetine 10 mg for anxiety and depression. He gets anxious. No new acute or focal issues. No new medical issues. No new stroke, head injury, seizures, meningitis and such. He did have tremor and hand shaking worse. Came off the carvidopa now? much. No other major psychiatric concerns. No other major medical issues. Sounds like he is doing well cognitively, as can be expected, which is good news. Diagnostics since last seen include: The right CCA is patent. There is minimal stenosis in the right ICA. There is intimal thickening present in the right ICA. The right ECA is patent. The right vertebral is antegrade. Left Carotid      The left CCA is patent. There is minimal stenosis in the left ICA. There is intimal thickening present in the left ICA. The left ECA is patent. The left vertebral is antegrade. EXAM: CT HEAD WO CONT     INDICATION: increased confusion     COMPARISON: 7/10/2017. CONTRAST: None. TECHNIQUE: Unenhanced CT of the head was performed using 5 mm images. Brain and  bone windows were generated.   CT dose reduction was achieved through use of a  standardized protocol tailored for this examination and automatic exposure  control for dose modulation. Adaptive statistical iterative reconstruction  (ASIR) was utilized. FINDINGS:  The ventricles and sulci are normal in size, shape and configuration and  midline. There is no significant white matter disease. There is no intracranial  hemorrhage, extra-axial collection, mass, mass effect or midline shift. The  basilar cisterns are open. No acute infarct is identified. The bone windows  demonstrate no abnormalities. The visualized portions of the paranasal sinuses  and mastoid air cells are clear. IMPRESSION  IMPRESSION: No acute process or change compared to the prior exam.     Olga Tillman MD     2/20/2020  5:44 PM    [address]      Electroencephalogram    Procedure ID: [id number] Procedure Date: 02/20/2020   Patient Name: Daniel Gonzáles YOB: 1950   Procedure Type: Routine Medical Record No: [id number]     INDICATION: tremors, dementia    Medications:  Current Outpatient Medications   Medication Sig    donepeziL (ARICEPT) 10 mg tablet TAKE 1 TABLET BY MOUTH   EVERYDAY AT BEDTIME    FLUoxetine (PROZAC) 10 mg tablet TAKE 1 TABLET BY MOUTH EVERY   DAY    levothyroxine (SYNTHROID) 112 mcg tablet     carbidopa-levodopa (SINEMET)  mg per tablet Take 1 Tab by   mouth three (3) times daily. fludrocortisone (FLORINEF) 0.1 mg tablet Take 0.1 mg by mouth   daily. finasteride (PROSCAR) 5 mg tablet Take 5 mg by mouth daily. melatonin 3 mg tablet Take  by mouth nightly. tamsulosin (FLOMAX) 0.4 mg capsule Take 0.4 mg by mouth daily. aspirin delayed-release 81 mg tablet Take 325 mg by mouth   daily. No current facility-administered medications for this encounter. DESCRIPTION OF PROCEDURE: Electrodes were applied in accordance   with the international 10-20 system of electrode placement. EEG   was reviewed in both bipolar and referential montages    Description of Activity:  During wakefulness, there is continuous runs of 8 Hz symmetric   low voltage posterior alpha rhythm, that attenuate symmetrically   with eye opening. Low voltage beta activity occurs symmetrically   at the anterior head regions bilaterally. During drowsiness, there is attenuation of the alpha rhythm and   low voltage theta activity occurs bilaterally. Intermittent photic stimulation was performed and did not induce   posterior driving responses. No sharp or spike discharges, seizures or epileptiform discharges   seen. No focal asymmetry. Clinical Interpretation: This EEG, performed during wakefulness and drowsiness is normal.   There is no focal asymmetry, seizures or epileptiform discharges   seen. Dx then included: This is the patient's second evaluation of neurocognitive and psychological status. On first examination, he was showing mild dementia with mild anxiety and depression. Comparison of current with previous neurocognitive test results show a clear decline across a number of neurocognitive domains assessed, especially processing speed, perceptual reasoning, auditory memory, executive functioning, and nondominant handed fine motor dexterity. Neurologic correlation is indicated. He is again showing a mixed normal/Global pattern of performance across all other neurocognitive domains assessed. From an emotional standpoint, he is again reporting concerns related to mild levels of anxiety and depression without concern for additional psychopathology. In my opinion, this profile is consistent with an evolving organic process that  has progressed from a mild level of severity to a mild to moderate level of severity. A mood issues continue to exacerbate, but this is not a pseudodementia. He is on medication for mood and for memory.   I suggest a review of his current medication management for memory. Consider active engagement in counseling. The patient should be encouraged to remain as mentally, socially, and physically active as possible. The patient's generated cognitive difficulties are significant to the degree whereby it is my opinion that he should not live independently without appropriate supervision for those domains with a heavy memory emphasis. This includes medication management supervision and supervision of financial dealings. Consider a formal evaluation of driving safety, though I am not yet overly concerned about same. At the present time, I find the patient is lacking capacity for informed medical or financial decisions and suggest that a POA be assigned if this is not been done so already. I agree with the current living arrangement, but as his needs for supervision increase over time, we will increase the level of supervision by considering in-home level care if needed. Respite care for the spouse is advised. We now have baseline and updated neurocognitive data on him. Follow-up yearly. Clinical correlation is, of course, indicated. I will discuss these findings with the patient and family when they follow up with me in the near future. A follow up Neuropsychological Evaluation is indicated on a prn basis. DIAGNOSES: Dementia - Mild To Moderate (progressing slowly)                          Anxiety - Mild (on med)                          Depression - Mild (on med)      He is on Donepezil. Spouse feels like his memory is progressing. He has been diagnosed with Parkinson's. He has been having some \"episodes\" at least 6 or 7, back in September. Got out of the car, meeting some people for breakfast, and he walked to the end of the sidewalk and he wasn't feeling good and it was like he just blacked out, couldn't move. Had to have help getting back to the car. Called ambulance and went to [facility].  This has happened a couple of times. His medications can be causing some drop in blood pressure. Will be having a prostate procedure soon so hopefully will not be getting up so much to go to the bathroom at night. He is more confused at night. On med for mood. No new stroke, meningitis/encephalitis, JAMIE Fever, Lupus, Lyme, TBI, sz. Neuropsychological Mental Status Exam (NMSE):        Historian: Good  Praxis: No UE apraxia  R/L Orientation: Intact to self and to other  Dress: within normal limits   Weight: within normal limits   Appearance/Hygiene: within normal limits   Gait: within normal limits   Assistive Devices: None  Mood: within normal limits   Affect: within normal limits   Comprehension: within normal limits   Thought Process: within normal limits   Expressive Language: Mild articulation problems    Receptive Language: within normal limits   Motor:  No cognitive perseveration. Shaking on the left side, worse. ETOH: Very rarely  Tobacco: Denied  Illicit: Denied  SI/HI: Denied  Psychosis: he is now having visual hallucinations. He sees dust bunnies, and stuff moving across the floor, and that the floor has writing on it. He says things are moving all the time. Insight: Within normal limits  Judgment: Within normal limits  Other Psych:            Past Medical History:   Diagnosis Date    Anxiety     Aortic insufficiency regurgitation 11/9/2010    Arthritis     neck    Atrial flutter (HCC)     cardioversion 5/18/11    BPH (benign prostatic hyperplasia)     Hypothyroid     Migraines     mobitz 1, atrioventricular block 11/9/2010       Past Surgical History:   Procedure Laterality Date    ECHO 2D ADULT  9/2010    normal LV wall motion and ejection fraction, left ventricular enlargement, left atrial enlargement, severe aortic regurgitation and mild mitral regurgitation. The ejection fraction was 55-60%.      ECHO 2D ADULT  2/2011    mild LVE, EF 58%, LAE, large Ao root, Severe AI, 2+MR    ECHO 2D ADULT 6/2011    LVH, EF 60%, trace -mild MR    HX CATARACT REMOVAL      RIGHT EYE    HX GI      inginal hernia repair 2008    HX HEART CATHETERIZATION  1987    normal coronary arteries normal left ventricular function. HX HEART CATHETERIZATION  2011    Normal coronaries    HX HEART VALVE SURGERY  4/2011    AVR, #[address] [facility] [place] Epic porcine    HX HERNIA REPAIR      HX PACEMAKER  1987    VVI, after stopping \"voltarin\" his block resolved. Subsequently battery depleted in 2002 and was not replaced       Allergies   Allergen Reactions    Gantrisin Rash       Family History   Problem Relation Age of Onset    Alcohol abuse Mother     Lung Disease Mother         EMPHYSEMA    Cancer Mother         LUNG CA    Stroke Mother     Heart Disease Father         MI       Social History     Tobacco Use    Smoking status: Never    Smokeless tobacco: Never   Substance Use Topics    Alcohol use: No     Comment: OCCASIONAL ETOH    Drug use: No     Types: Prescription       Current Outpatient Medications   Medication Sig Dispense Refill    amantadine HCL (SYMMETREL) 100 mg capsule Take 1 Cap by mouth two (2) times a day. 60 Cap 5    carbidopa-levodopa (SINEMET)  mg per tablet TAKE 1 TABLET BY MOUTH 5 TIMES A  Tab 1    donepeziL (ARICEPT) 10 mg tablet TAKE 1 TABLET BY MOUTH EVERYDAY AT BEDTIME      FLUoxetine (PROZAC) 10 mg tablet TAKE 1 TABLET BY MOUTH EVERY DAY      levothyroxine (SYNTHROID) 112 mcg tablet       fludrocortisone (FLORINEF) 0.1 mg tablet Take 0.1 mg by mouth daily. finasteride (PROSCAR) 5 mg tablet Take 5 mg by mouth daily. melatonin 3 mg tablet Take  by mouth nightly. tamsulosin (FLOMAX) 0.4 mg capsule Take 0.4 mg by mouth two (2) times a day. 3    aspirin delayed-release 81 mg tablet Take 325 mg by mouth daily. Plan:  Obtain authorization for testing from insurance company. Report to follow once testing, scoring, and interpretation completed.   ? Organic based neurocognitive issues versus mood disorder or combination of same. ? Problems organic, functional, or both? This note will not be viewable in [address]

## 2023-11-16 ENCOUNTER — OFFICE VISIT (OUTPATIENT)
Age: 73
End: 2023-11-16

## 2023-11-16 VITALS
RESPIRATION RATE: 18 BRPM | OXYGEN SATURATION: 96 % | WEIGHT: 175.6 LBS | HEART RATE: 73 BPM | TEMPERATURE: 97.8 F | HEIGHT: 72 IN | DIASTOLIC BLOOD PRESSURE: 73 MMHG | SYSTOLIC BLOOD PRESSURE: 109 MMHG | BODY MASS INDEX: 23.78 KG/M2

## 2023-11-16 DIAGNOSIS — J40 BRONCHITIS: Primary | ICD-10-CM

## 2023-11-16 LAB
EXP DATE SOLUTION: NORMAL
EXP DATE SWAB: NORMAL
EXPIRATION DATE: NORMAL
INFLUENZA A ANTIGEN, POC: NEGATIVE
INFLUENZA B ANTIGEN, POC: NEGATIVE
LOT NUMBER POC: NORMAL
LOT NUMBER SOLUTION: NORMAL
LOT NUMBER SWAB: NORMAL
SARS-COV-2 RNA, POC: NEGATIVE
VALID INTERNAL CONTROL, POC: YES

## 2023-11-16 RX ORDER — AMOXICILLIN AND CLAVULANATE POTASSIUM 875; 125 MG/1; MG/1
1 TABLET, FILM COATED ORAL 2 TIMES DAILY
Qty: 14 TABLET | Refills: 0 | Status: SHIPPED | OUTPATIENT
Start: 2023-11-16 | End: 2023-11-23

## 2023-11-16 ASSESSMENT — ENCOUNTER SYMPTOMS: COUGH: 1

## 2023-11-16 NOTE — PROGRESS NOTES
Patient is nontoxic appearing and not in need of emergent medical intervention. The patient is a good candidate for outpatient therapy based on normal PO intake, reassuring exam with clear lungs on auscultation, normal oxygen saturations, and lack of respiratory distress upon discharge. Discharge decision based on the following:  clinical impression is consistent with outpatient treatment, patient's exam is stable, and patient's condition is stable. -Provided reassurance and reassessment  -Discussed over-the-counter medications for symptomatic relief  -Provided educational material and patient instructions  -Discharged patient with instructions to follow up with PCP  -Advised to immediately to the ED for worsening or persistent symptoms      I have discussed the results, diagnosis and treatment plan with the patient. The patient also understands that early in the process of an illness, an urgent care workup can be falsely reassuring. Routine discharge counseling and specific return precautions discussed with patient and the patient understands that worsening, changing or persistent symptoms should prompt an immediate return to the urgent care or emergency department. Patient/Guardian expressed understanding and agrees with the discharge plan. No further questions at time of discharge.     Caleb Bowie, APRN - CNP

## 2023-11-16 NOTE — PATIENT INSTRUCTIONS
Please follow up with your primary care provider within 2-5 days if your signs and symptoms have not resolved or worsened. Please be mindful of features that warrant immediate attention:  new onset fever, difficulty breathing, symptoms lasting more than 3-4 weeks, or bloody sputum. Please go immediately to the Emergency Department if you develop any shortness of breath or chest pain. Symptomatic Relief  Nasal Congestion:  Flonase (over the counter) nasal spray, once a day  Saline irrigation kits help wash out sinuses 1-2 times a day  Normal saline nasal spray    Cough:  Throat lozenges, hot tea, and honey may help  Vicks VapoRub at night to help with cough and relieve muscles aches and pain  If not prescribed a cough medication, Delsym is an option. It is an over the counter cough medication containing dextromethorphan to help suppress cough at night   *Please only take when absolutely needed, as this is a controlled substance that can cause addiction   *Please only take cough syrup at nighttime as it causes drowsiness   *Do not drive or operate any machinery while taking this medication  If you have high blood pressure, take Coricidin HBP (or the generic form) instead. Follow instructions on the box. Congestion: For thick mucus, take Mucinex (with Guafenesin only) to help thin the mucus. Follow instructions on the box. You will need to drink plenty of water with this medication. Sore Throat:  Lozenges, as needed. Cepacol lozenges will help numb the throat  Chloraseptic spray also helps to numb throat pain  Salt water gargles to soothe throat pain    Sinus pain/pressure:  Warm, wet towel on face to help with facial sinus pain/pressure    Headache/Pain Fever/Body Aches:   If you can take NSAIDs, take Ibuprofen 400-800mg every 8 hours as needed  If you cannot take NSAIDs, take Tylenol 325-500mg every 6 hours as needed    Miscellanous:  Zyrtec/Xyzal/Allegra/Claritin during the day or Benadryl at night

## 2023-11-25 ASSESSMENT — ENCOUNTER SYMPTOMS: SHORTNESS OF BREATH: 0

## 2024-07-18 NOTE — TELEPHONE ENCOUNTER
It is with Dr. Ira Ceron at [facility]. If you can see if he can get in earlier that would be great  Or put on a cancel list.   We can try another medication if they want.  It really has been just trying to manag that way  Let me know see HPI see HPI, sx resolved at this time

## 2025-04-16 ENCOUNTER — APPOINTMENT (OUTPATIENT)
Facility: HOSPITAL | Age: 75
DRG: 689 | End: 2025-04-16
Payer: MEDICARE

## 2025-04-16 ENCOUNTER — HOSPITAL ENCOUNTER (INPATIENT)
Facility: HOSPITAL | Age: 75
LOS: 11 days | Discharge: HOSPICE | DRG: 689 | End: 2025-04-27
Attending: EMERGENCY MEDICINE | Admitting: STUDENT IN AN ORGANIZED HEALTH CARE EDUCATION/TRAINING PROGRAM
Payer: MEDICARE

## 2025-04-16 DIAGNOSIS — R41.82 ALTERED MENTAL STATUS, UNSPECIFIED ALTERED MENTAL STATUS TYPE: ICD-10-CM

## 2025-04-16 DIAGNOSIS — Z51.5 PALLIATIVE CARE ENCOUNTER: ICD-10-CM

## 2025-04-16 DIAGNOSIS — N30.00 ACUTE CYSTITIS WITHOUT HEMATURIA: Primary | ICD-10-CM

## 2025-04-16 LAB
ALBUMIN SERPL-MCNC: 3.5 G/DL (ref 3.5–5)
ALBUMIN/GLOB SERPL: 1.1 (ref 1.1–2.2)
ALP SERPL-CCNC: 63 U/L (ref 45–117)
ALT SERPL-CCNC: <6 U/L (ref 12–78)
AMMONIA PLAS-SCNC: 12 UMOL/L
AMPHET UR QL SCN: NEGATIVE
ANION GAP SERPL CALC-SCNC: 5 MMOL/L (ref 2–12)
APPEARANCE UR: ABNORMAL
AST SERPL-CCNC: 20 U/L (ref 15–37)
BACTERIA URNS QL MICRO: ABNORMAL /HPF
BARBITURATES UR QL SCN: NEGATIVE
BASOPHILS # BLD: 0.05 K/UL (ref 0–0.1)
BASOPHILS NFR BLD: 0.3 % (ref 0–1)
BENZODIAZ UR QL: NEGATIVE
BILIRUB SERPL-MCNC: 2.1 MG/DL (ref 0.2–1)
BILIRUB UR QL: NEGATIVE
BUN SERPL-MCNC: 19 MG/DL (ref 6–20)
BUN/CREAT SERPL: 15 (ref 12–20)
CALCIUM SERPL-MCNC: 8.7 MG/DL (ref 8.5–10.1)
CANNABINOIDS UR QL SCN: NEGATIVE
CHLORIDE SERPL-SCNC: 105 MMOL/L (ref 97–108)
CO2 SERPL-SCNC: 29 MMOL/L (ref 21–32)
COCAINE UR QL SCN: NEGATIVE
COLOR UR: ABNORMAL
COMMENT:: NORMAL
CREAT SERPL-MCNC: 1.3 MG/DL (ref 0.7–1.3)
DIFFERENTIAL METHOD BLD: ABNORMAL
EOSINOPHIL # BLD: 0.01 K/UL (ref 0–0.4)
EOSINOPHIL NFR BLD: 0.1 % (ref 0–7)
EPITH CASTS URNS QL MICRO: ABNORMAL /LPF
ERYTHROCYTE [DISTWIDTH] IN BLOOD BY AUTOMATED COUNT: 12.4 % (ref 11.5–14.5)
FLUAV RNA SPEC QL NAA+PROBE: NOT DETECTED
FLUBV RNA SPEC QL NAA+PROBE: NOT DETECTED
GLOBULIN SER CALC-MCNC: 3.1 G/DL (ref 2–4)
GLUCOSE SERPL-MCNC: 86 MG/DL (ref 65–100)
GLUCOSE UR STRIP.AUTO-MCNC: NEGATIVE MG/DL
HCT VFR BLD AUTO: 52.2 % (ref 36.6–50.3)
HGB BLD-MCNC: 17.4 G/DL (ref 12.1–17)
HGB UR QL STRIP: NEGATIVE
IMM GRANULOCYTES # BLD AUTO: 0.12 K/UL (ref 0–0.04)
IMM GRANULOCYTES NFR BLD AUTO: 0.7 % (ref 0–0.5)
KETONES UR QL STRIP.AUTO: 40 MG/DL
LACTATE BLD-SCNC: 1.21 MMOL/L (ref 0.4–2)
LEUKOCYTE ESTERASE UR QL STRIP.AUTO: ABNORMAL
LYMPHOCYTES # BLD: 0.93 K/UL (ref 0.8–3.5)
LYMPHOCYTES NFR BLD: 5.1 % (ref 12–49)
Lab: NORMAL
MAGNESIUM SERPL-MCNC: 2.1 MG/DL (ref 1.6–2.4)
MCH RBC QN AUTO: 29.7 PG (ref 26–34)
MCHC RBC AUTO-ENTMCNC: 33.3 G/DL (ref 30–36.5)
MCV RBC AUTO: 89.2 FL (ref 80–99)
METHADONE UR QL: NEGATIVE
MONOCYTES # BLD: 1.22 K/UL (ref 0–1)
MONOCYTES NFR BLD: 6.7 % (ref 5–13)
NEUTS SEG # BLD: 15.95 K/UL (ref 1.8–8)
NEUTS SEG NFR BLD: 87.1 % (ref 32–75)
NITRITE UR QL STRIP.AUTO: NEGATIVE
NRBC # BLD: 0 K/UL (ref 0–0.01)
NRBC BLD-RTO: 0 PER 100 WBC
OPIATES UR QL: NEGATIVE
PCP UR QL: NEGATIVE
PH UR STRIP: 5.5 (ref 5–8)
PLATELET # BLD AUTO: 171 K/UL (ref 150–400)
PMV BLD AUTO: 10.5 FL (ref 8.9–12.9)
POTASSIUM SERPL-SCNC: 3.8 MMOL/L (ref 3.5–5.1)
PROT SERPL-MCNC: 6.6 G/DL (ref 6.4–8.2)
PROT UR STRIP-MCNC: ABNORMAL MG/DL
RBC # BLD AUTO: 5.85 M/UL (ref 4.1–5.7)
RBC #/AREA URNS HPF: ABNORMAL /HPF (ref 0–5)
SARS-COV-2 RNA RESP QL NAA+PROBE: NOT DETECTED
SODIUM SERPL-SCNC: 139 MMOL/L (ref 136–145)
SOURCE: NORMAL
SP GR UR REFRACTOMETRY: 1.02 (ref 1–1.03)
SPECIMEN HOLD: NORMAL
TROPONIN I SERPL HS-MCNC: 16 NG/L (ref 0–76)
TSH SERPL DL<=0.05 MIU/L-ACNC: 2.56 UIU/ML (ref 0.36–3.74)
URINE CULTURE IF INDICATED: ABNORMAL
UROBILINOGEN UR QL STRIP.AUTO: 2 EU/DL (ref 0.2–1)
WBC # BLD AUTO: 18.3 K/UL (ref 4.1–11.1)
WBC URNS QL MICRO: ABNORMAL /HPF (ref 0–4)

## 2025-04-16 PROCEDURE — 2500000003 HC RX 250 WO HCPCS: Performed by: EMERGENCY MEDICINE

## 2025-04-16 PROCEDURE — 6360000002 HC RX W HCPCS: Performed by: EMERGENCY MEDICINE

## 2025-04-16 PROCEDURE — 83735 ASSAY OF MAGNESIUM: CPT

## 2025-04-16 PROCEDURE — 87636 SARSCOV2 & INF A&B AMP PRB: CPT

## 2025-04-16 PROCEDURE — 83605 ASSAY OF LACTIC ACID: CPT

## 2025-04-16 PROCEDURE — 71045 X-RAY EXAM CHEST 1 VIEW: CPT

## 2025-04-16 PROCEDURE — 99285 EMERGENCY DEPT VISIT HI MDM: CPT

## 2025-04-16 PROCEDURE — 84443 ASSAY THYROID STIM HORMONE: CPT

## 2025-04-16 PROCEDURE — 80307 DRUG TEST PRSMV CHEM ANLYZR: CPT

## 2025-04-16 PROCEDURE — 6360000002 HC RX W HCPCS

## 2025-04-16 PROCEDURE — 84484 ASSAY OF TROPONIN QUANT: CPT

## 2025-04-16 PROCEDURE — 87088 URINE BACTERIA CULTURE: CPT

## 2025-04-16 PROCEDURE — 2500000003 HC RX 250 WO HCPCS: Performed by: STUDENT IN AN ORGANIZED HEALTH CARE EDUCATION/TRAINING PROGRAM

## 2025-04-16 PROCEDURE — 36415 COLL VENOUS BLD VENIPUNCTURE: CPT

## 2025-04-16 PROCEDURE — 85025 COMPLETE CBC W/AUTO DIFF WBC: CPT

## 2025-04-16 PROCEDURE — 70450 CT HEAD/BRAIN W/O DYE: CPT

## 2025-04-16 PROCEDURE — 87186 SC STD MICRODIL/AGAR DIL: CPT

## 2025-04-16 PROCEDURE — 1100000000 HC RM PRIVATE

## 2025-04-16 PROCEDURE — 81001 URINALYSIS AUTO W/SCOPE: CPT

## 2025-04-16 PROCEDURE — 82746 ASSAY OF FOLIC ACID SERUM: CPT

## 2025-04-16 PROCEDURE — 6370000000 HC RX 637 (ALT 250 FOR IP): Performed by: STUDENT IN AN ORGANIZED HEALTH CARE EDUCATION/TRAINING PROGRAM

## 2025-04-16 PROCEDURE — 72125 CT NECK SPINE W/O DYE: CPT

## 2025-04-16 PROCEDURE — 87086 URINE CULTURE/COLONY COUNT: CPT

## 2025-04-16 PROCEDURE — 82607 VITAMIN B-12: CPT

## 2025-04-16 PROCEDURE — 80053 COMPREHEN METABOLIC PANEL: CPT

## 2025-04-16 PROCEDURE — 82140 ASSAY OF AMMONIA: CPT

## 2025-04-16 RX ORDER — DIAZEPAM 10 MG/2ML
5 INJECTION, SOLUTION INTRAMUSCULAR; INTRAVENOUS ONCE
Status: COMPLETED | OUTPATIENT
Start: 2025-04-16 | End: 2025-04-16

## 2025-04-16 RX ORDER — CARBIDOPA AND LEVODOPA 25; 100 MG/1; MG/1
1 TABLET ORAL
Status: DISCONTINUED | OUTPATIENT
Start: 2025-04-16 | End: 2025-04-16

## 2025-04-16 RX ORDER — FLUOXETINE 10 MG/1
10 CAPSULE ORAL DAILY
Status: DISCONTINUED | OUTPATIENT
Start: 2025-04-17 | End: 2025-04-27 | Stop reason: HOSPADM

## 2025-04-16 RX ORDER — PANTOPRAZOLE SODIUM 40 MG/1
40 TABLET, DELAYED RELEASE ORAL
Status: DISCONTINUED | OUTPATIENT
Start: 2025-04-17 | End: 2025-04-27 | Stop reason: HOSPADM

## 2025-04-16 RX ORDER — ENOXAPARIN SODIUM 100 MG/ML
40 INJECTION SUBCUTANEOUS DAILY
Status: DISCONTINUED | OUTPATIENT
Start: 2025-04-17 | End: 2025-04-27 | Stop reason: HOSPADM

## 2025-04-16 RX ORDER — FLUDROCORTISONE ACETATE 0.1 MG/1
0.1 TABLET ORAL DAILY
Status: DISCONTINUED | OUTPATIENT
Start: 2025-04-17 | End: 2025-04-16

## 2025-04-16 RX ORDER — FLUDROCORTISONE ACETATE 0.1 MG/1
0.2 TABLET ORAL DAILY
Status: DISCONTINUED | OUTPATIENT
Start: 2025-04-17 | End: 2025-04-16 | Stop reason: DRUGHIGH

## 2025-04-16 RX ORDER — QUETIAPINE FUMARATE 25 MG/1
12.5 TABLET, FILM COATED ORAL EVERY MORNING
Status: DISCONTINUED | OUTPATIENT
Start: 2025-04-17 | End: 2025-04-27 | Stop reason: HOSPADM

## 2025-04-16 RX ORDER — QUETIAPINE FUMARATE 25 MG/1
50 TABLET, FILM COATED ORAL NIGHTLY
Status: DISCONTINUED | OUTPATIENT
Start: 2025-04-16 | End: 2025-04-27 | Stop reason: HOSPADM

## 2025-04-16 RX ORDER — ONDANSETRON 4 MG/1
4 TABLET, ORALLY DISINTEGRATING ORAL EVERY 8 HOURS PRN
Status: DISCONTINUED | OUTPATIENT
Start: 2025-04-16 | End: 2025-04-17

## 2025-04-16 RX ORDER — SODIUM CHLORIDE 9 MG/ML
INJECTION, SOLUTION INTRAVENOUS PRN
Status: DISCONTINUED | OUTPATIENT
Start: 2025-04-16 | End: 2025-04-27 | Stop reason: HOSPADM

## 2025-04-16 RX ORDER — QUETIAPINE FUMARATE 25 MG/1
12.5 TABLET, FILM COATED ORAL EVERY MORNING
Status: ON HOLD | COMMUNITY
End: 2025-04-27 | Stop reason: HOSPADM

## 2025-04-16 RX ORDER — ACETAMINOPHEN 325 MG/1
650 TABLET ORAL EVERY 6 HOURS PRN
Status: DISCONTINUED | OUTPATIENT
Start: 2025-04-16 | End: 2025-04-27 | Stop reason: HOSPADM

## 2025-04-16 RX ORDER — POTASSIUM CHLORIDE 7.45 MG/ML
10 INJECTION INTRAVENOUS PRN
Status: DISCONTINUED | OUTPATIENT
Start: 2025-04-16 | End: 2025-04-27 | Stop reason: HOSPADM

## 2025-04-16 RX ORDER — ONDANSETRON 2 MG/ML
4 INJECTION INTRAMUSCULAR; INTRAVENOUS EVERY 6 HOURS PRN
Status: DISCONTINUED | OUTPATIENT
Start: 2025-04-16 | End: 2025-04-17

## 2025-04-16 RX ORDER — SODIUM CHLORIDE 9 MG/ML
INJECTION, SOLUTION INTRAVENOUS CONTINUOUS
Status: DISPENSED | OUTPATIENT
Start: 2025-04-16 | End: 2025-04-17

## 2025-04-16 RX ORDER — DONEPEZIL HYDROCHLORIDE 5 MG/1
10 TABLET, FILM COATED ORAL
Status: DISCONTINUED | OUTPATIENT
Start: 2025-04-16 | End: 2025-04-27 | Stop reason: HOSPADM

## 2025-04-16 RX ORDER — ASPIRIN 325 MG
325 TABLET, DELAYED RELEASE (ENTERIC COATED) ORAL DAILY
Status: DISCONTINUED | OUTPATIENT
Start: 2025-04-17 | End: 2025-04-23 | Stop reason: SDUPTHER

## 2025-04-16 RX ORDER — SODIUM CHLORIDE 0.9 % (FLUSH) 0.9 %
5-40 SYRINGE (ML) INJECTION EVERY 12 HOURS SCHEDULED
Status: DISCONTINUED | OUTPATIENT
Start: 2025-04-16 | End: 2025-04-27 | Stop reason: HOSPADM

## 2025-04-16 RX ORDER — POLYETHYLENE GLYCOL 3350 17 G/17G
17 POWDER, FOR SOLUTION ORAL DAILY PRN
Status: DISCONTINUED | OUTPATIENT
Start: 2025-04-16 | End: 2025-04-27 | Stop reason: HOSPADM

## 2025-04-16 RX ORDER — POTASSIUM CHLORIDE 750 MG/1
40 TABLET, EXTENDED RELEASE ORAL PRN
Status: DISCONTINUED | OUTPATIENT
Start: 2025-04-16 | End: 2025-04-27 | Stop reason: HOSPADM

## 2025-04-16 RX ORDER — QUETIAPINE FUMARATE 25 MG/1
50 TABLET, FILM COATED ORAL NIGHTLY
Status: ON HOLD | COMMUNITY
End: 2025-04-27 | Stop reason: HOSPADM

## 2025-04-16 RX ORDER — FLUDROCORTISONE ACETATE 0.1 MG/1
0.1 TABLET ORAL DAILY
Status: DISCONTINUED | OUTPATIENT
Start: 2025-04-17 | End: 2025-04-17

## 2025-04-16 RX ORDER — LEVOTHYROXINE SODIUM 112 UG/1
112 TABLET ORAL DAILY
Status: DISCONTINUED | OUTPATIENT
Start: 2025-04-17 | End: 2025-04-27 | Stop reason: HOSPADM

## 2025-04-16 RX ORDER — MAGNESIUM SULFATE IN WATER 40 MG/ML
2000 INJECTION, SOLUTION INTRAVENOUS PRN
Status: DISCONTINUED | OUTPATIENT
Start: 2025-04-16 | End: 2025-04-27 | Stop reason: HOSPADM

## 2025-04-16 RX ORDER — SODIUM CHLORIDE 0.9 % (FLUSH) 0.9 %
5-40 SYRINGE (ML) INJECTION PRN
Status: DISCONTINUED | OUTPATIENT
Start: 2025-04-16 | End: 2025-04-27 | Stop reason: HOSPADM

## 2025-04-16 RX ORDER — CARBIDOPA AND LEVODOPA 25; 100 MG/1; MG/1
2 TABLET ORAL 3 TIMES DAILY
Status: DISCONTINUED | OUTPATIENT
Start: 2025-04-16 | End: 2025-04-27 | Stop reason: HOSPADM

## 2025-04-16 RX ORDER — ACETAMINOPHEN 650 MG/1
650 SUPPOSITORY RECTAL EVERY 6 HOURS PRN
Status: DISCONTINUED | OUTPATIENT
Start: 2025-04-16 | End: 2025-04-27 | Stop reason: HOSPADM

## 2025-04-16 RX ADMIN — QUETIAPINE FUMARATE 50 MG: 25 TABLET ORAL at 21:53

## 2025-04-16 RX ADMIN — DIAZEPAM 5 MG: 5 INJECTION, SOLUTION INTRAMUSCULAR; INTRAVENOUS at 23:36

## 2025-04-16 RX ADMIN — CARBIDOPA AND LEVODOPA 2 TABLET: 25; 100 TABLET ORAL at 21:40

## 2025-04-16 RX ADMIN — SODIUM CHLORIDE, PRESERVATIVE FREE 10 ML: 5 INJECTION INTRAVENOUS at 21:17

## 2025-04-16 RX ADMIN — WATER 1000 MG: 1 INJECTION INTRAMUSCULAR; INTRAVENOUS; SUBCUTANEOUS at 21:15

## 2025-04-16 ASSESSMENT — PAIN - FUNCTIONAL ASSESSMENT: PAIN_FUNCTIONAL_ASSESSMENT: NONE - DENIES PAIN

## 2025-04-16 NOTE — ED NOTES
Family reports pt had a fall this morning around 0700 that was unwitnessed. Family reports pt takes 325 ASA daily but denies blood thinners. Family reports last known normal 1700 yesterday evening. Provider notified

## 2025-04-16 NOTE — ED TRIAGE NOTES
Pt arrives to ED via EMS from home where family called 911 for AMS. EMS reports pt woke up this morning at 0700 with AMS. Last seen normal last night (unsure of time--family on the way). Hx dementia and Parkinson's. Blood sugar 72 en route to ED.

## 2025-04-16 NOTE — ED PROVIDER NOTES
EMERGENCY DEPARTMENT PHYSICIAN NOTE     Patient: Aureliano Roth     Time of Service: 4/16/2025  5:21 PM     Chief complaint:   Chief Complaint   Patient presents with    Altered Mental Status        HISTORY:  Patient is a 74 y.o. male who presents to the emergency department with complaints of altered status.      Past Medical History:   Diagnosis Date    Anxiety     Aortic insufficiency 11/9/2010    Arthritis     neck    Atrial flutter (HCC)     cardioversion 5/18/11    BPH (benign prostatic hyperplasia)     Hypothyroid     Migraines     Other second degree atrioventricular block 11/9/2010        Past Surgical History:   Procedure Laterality Date    CARDIAC CATHETERIZATION  1987    normal coronary arteries normal left ventricular function.     CARDIAC CATHETERIZATION  2011    Normal coronaries    CARDIAC VALVE SURGERY  4/2011    AVR, #[address] [facility] Epic porcine    CATARACT REMOVAL      RIGHT EYE    ECHO 2D ADULT  2/2011    mild LVE, EF 58%, LAE, large Ao root, Severe AI, 2+MR    ECHO 2D ADULT  9/2010    normal LV wall motion and ejection fraction, left ventricular enlargement, left atrial enlargement, severe aortic regurgitation and mild mitral regurgitation. The ejection fraction was 55-60%.     ECHO 2D ADULT  6/2011    LVH, EF 60%, trace -mild MR    GI      inginal hernia repair 2008    HERNIA REPAIR      PACEMAKER  1987    VVI, after stopping \"voltarin\" his block resolved. Subsequently battery depleted in 2002 and was not replaced        Family History   Problem Relation Age of Onset    Lung Disease Mother         EMPHYSEMA    Cancer Mother         LUNG CA    Stroke Mother     Heart Disease Father         MI    Alcohol Abuse Mother         Social History     Socioeconomic History    Marital status:    Tobacco Use    Smoking status: Never    Smokeless tobacco: Never   Substance and Sexual Activity    Alcohol use: No    Drug use: No     Types: Prescription     Social Drivers of Health     Food 
Advanced dementia    Aphasic stroke    Constipation    COVID-19 virus detected    CVA (cerebral vascular accident)    Dementia of frontal lobe type    Diabetes mellitus    DM (diabetes mellitus)    GERD (gastroesophageal reflux disease)    HTN (hypertension)    Hypertension    Pneumonia    Quadriplegia    Respiratory failure    Respiratory failure

## 2025-04-17 LAB
ANION GAP SERPL CALC-SCNC: 9 MMOL/L (ref 2–12)
BASOPHILS # BLD: 0.05 K/UL (ref 0–0.1)
BASOPHILS NFR BLD: 0.3 % (ref 0–1)
BUN SERPL-MCNC: 19 MG/DL (ref 6–20)
BUN/CREAT SERPL: 17 (ref 12–20)
CALCIUM SERPL-MCNC: 8.2 MG/DL (ref 8.5–10.1)
CHLORIDE SERPL-SCNC: 107 MMOL/L (ref 97–108)
CO2 SERPL-SCNC: 25 MMOL/L (ref 21–32)
CREAT SERPL-MCNC: 1.15 MG/DL (ref 0.7–1.3)
DIFFERENTIAL METHOD BLD: ABNORMAL
EOSINOPHIL # BLD: 0.02 K/UL (ref 0–0.4)
EOSINOPHIL NFR BLD: 0.1 % (ref 0–7)
ERYTHROCYTE [DISTWIDTH] IN BLOOD BY AUTOMATED COUNT: 12.6 % (ref 11.5–14.5)
FOLATE SERPL-MCNC: 14.9 NG/ML (ref 5–21)
GLUCOSE SERPL-MCNC: 100 MG/DL (ref 65–100)
HCT VFR BLD AUTO: 47.1 % (ref 36.6–50.3)
HGB BLD-MCNC: 15.7 G/DL (ref 12.1–17)
IMM GRANULOCYTES # BLD AUTO: 0.08 K/UL (ref 0–0.04)
IMM GRANULOCYTES NFR BLD AUTO: 0.5 % (ref 0–0.5)
LYMPHOCYTES # BLD: 0.41 K/UL (ref 0.8–3.5)
LYMPHOCYTES NFR BLD: 2.6 % (ref 12–49)
MCH RBC QN AUTO: 29.1 PG (ref 26–34)
MCHC RBC AUTO-ENTMCNC: 33.3 G/DL (ref 30–36.5)
MCV RBC AUTO: 87.2 FL (ref 80–99)
MONOCYTES # BLD: 0.75 K/UL (ref 0–1)
MONOCYTES NFR BLD: 4.8 % (ref 5–13)
NEUTS SEG # BLD: 14.39 K/UL (ref 1.8–8)
NEUTS SEG NFR BLD: 91.7 % (ref 32–75)
NRBC # BLD: 0 K/UL (ref 0–0.01)
NRBC BLD-RTO: 0 PER 100 WBC
PLATELET # BLD AUTO: 147 K/UL (ref 150–400)
PMV BLD AUTO: 10.8 FL (ref 8.9–12.9)
POTASSIUM SERPL-SCNC: 4 MMOL/L (ref 3.5–5.1)
RBC # BLD AUTO: 5.4 M/UL (ref 4.1–5.7)
RBC MORPH BLD: ABNORMAL
SODIUM SERPL-SCNC: 141 MMOL/L (ref 136–145)
VIT B12 SERPL-MCNC: 214 PG/ML (ref 193–986)
WBC # BLD AUTO: 15.7 K/UL (ref 4.1–11.1)

## 2025-04-17 PROCEDURE — 2580000003 HC RX 258: Performed by: STUDENT IN AN ORGANIZED HEALTH CARE EDUCATION/TRAINING PROGRAM

## 2025-04-17 PROCEDURE — 36415 COLL VENOUS BLD VENIPUNCTURE: CPT

## 2025-04-17 PROCEDURE — 97165 OT EVAL LOW COMPLEX 30 MIN: CPT | Performed by: OCCUPATIONAL THERAPIST

## 2025-04-17 PROCEDURE — 2500000003 HC RX 250 WO HCPCS: Performed by: STUDENT IN AN ORGANIZED HEALTH CARE EDUCATION/TRAINING PROGRAM

## 2025-04-17 PROCEDURE — 94761 N-INVAS EAR/PLS OXIMETRY MLT: CPT

## 2025-04-17 PROCEDURE — 87040 BLOOD CULTURE FOR BACTERIA: CPT

## 2025-04-17 PROCEDURE — 80048 BASIC METABOLIC PNL TOTAL CA: CPT

## 2025-04-17 PROCEDURE — 6360000002 HC RX W HCPCS: Performed by: STUDENT IN AN ORGANIZED HEALTH CARE EDUCATION/TRAINING PROGRAM

## 2025-04-17 PROCEDURE — 1100000000 HC RM PRIVATE

## 2025-04-17 PROCEDURE — 99222 1ST HOSP IP/OBS MODERATE 55: CPT | Performed by: NURSE PRACTITIONER

## 2025-04-17 PROCEDURE — 6370000000 HC RX 637 (ALT 250 FOR IP): Performed by: STUDENT IN AN ORGANIZED HEALTH CARE EDUCATION/TRAINING PROGRAM

## 2025-04-17 PROCEDURE — 97161 PT EVAL LOW COMPLEX 20 MIN: CPT

## 2025-04-17 PROCEDURE — 85025 COMPLETE CBC W/AUTO DIFF WBC: CPT

## 2025-04-17 RX ORDER — FLUDROCORTISONE ACETATE 0.1 MG/1
0.2 TABLET ORAL DAILY
Status: DISCONTINUED | OUTPATIENT
Start: 2025-04-18 | End: 2025-04-21

## 2025-04-17 RX ORDER — FINASTERIDE 5 MG/1
5 TABLET, FILM COATED ORAL DAILY
Status: DISCONTINUED | OUTPATIENT
Start: 2025-04-18 | End: 2025-04-17

## 2025-04-17 RX ORDER — AMANTADINE HYDROCHLORIDE 100 MG/1
100 CAPSULE, GELATIN COATED ORAL 2 TIMES DAILY
Status: DISCONTINUED | OUTPATIENT
Start: 2025-04-17 | End: 2025-04-17

## 2025-04-17 RX ORDER — PROCHLORPERAZINE EDISYLATE 5 MG/ML
10 INJECTION INTRAMUSCULAR; INTRAVENOUS EVERY 6 HOURS PRN
Status: DISCONTINUED | OUTPATIENT
Start: 2025-04-17 | End: 2025-04-27 | Stop reason: HOSPADM

## 2025-04-17 RX ADMIN — CARBIDOPA AND LEVODOPA 2 TABLET: 25; 100 TABLET ORAL at 10:23

## 2025-04-17 RX ADMIN — WATER 1000 MG: 1 INJECTION INTRAMUSCULAR; INTRAVENOUS; SUBCUTANEOUS at 20:00

## 2025-04-17 RX ADMIN — CARBIDOPA AND LEVODOPA 2 TABLET: 25; 100 TABLET ORAL at 14:46

## 2025-04-17 RX ADMIN — ENOXAPARIN SODIUM 40 MG: 100 INJECTION SUBCUTANEOUS at 10:24

## 2025-04-17 RX ADMIN — ASPIRIN 325 MG: 325 TABLET, COATED ORAL at 10:23

## 2025-04-17 RX ADMIN — SODIUM CHLORIDE, PRESERVATIVE FREE 10 ML: 5 INJECTION INTRAVENOUS at 10:24

## 2025-04-17 RX ADMIN — LEVOTHYROXINE SODIUM 112 MCG: 0.11 TABLET ORAL at 06:33

## 2025-04-17 RX ADMIN — SODIUM CHLORIDE: 0.9 INJECTION, SOLUTION INTRAVENOUS at 03:20

## 2025-04-17 RX ADMIN — SODIUM CHLORIDE, PRESERVATIVE FREE 10 ML: 5 INJECTION INTRAVENOUS at 20:01

## 2025-04-17 RX ADMIN — ACETAMINOPHEN 650 MG: 325 TABLET ORAL at 10:28

## 2025-04-17 RX ADMIN — FLUOXETINE HYDROCHLORIDE 10 MG: 10 CAPSULE ORAL at 10:23

## 2025-04-17 RX ADMIN — CARBIDOPA AND LEVODOPA 2 TABLET: 25; 100 TABLET ORAL at 20:16

## 2025-04-17 RX ADMIN — FLUDROCORTISONE ACETATE 0.1 MG: 0.1 TABLET ORAL at 10:23

## 2025-04-17 ASSESSMENT — PAIN SCALES - WONG BAKER
WONGBAKER_NUMERICALRESPONSE: NO HURT

## 2025-04-17 NOTE — CARE COORDINATION
Care Management Initial Assessment  4/17/2025 3:05 PM  If patient is discharged prior to next notation, then this note serves as note for discharge by case management.    Reason for Admission:   Acute cystitis without hematuria [N30.00]  Altered mental status, unspecified altered mental status type [R41.82]  AMS (altered mental status) [R41.82]         Patient Admission Status: Inpatient  Date Admitted to INP: 4/16/2025  RUR: Readmission Risk Score: 11.5    Hospitalization in the last 30 days (Readmission):  No        Advance Care Planning:  Code Status: Full Code  Primary Healthcare Decision Maker: (P) Legal Next of Kin (Peg Roth [phone] (spouse))  Primary Decision Maker (Active): IraPeg - Spouse - [phone]   Advance Directive: has an advanced directive - a copy HAS NOT been provided.     __________________________________________________________________________  Assessment:     Pt was admitted on 4/16/2025 for AMS. CM met with Pt to complete initial assessment. CM introduced self, CM role, and verified demographics.     Hx at Outpatient Therapy for Dementia and Parkinson's.         04/17/25 1502   Service Assessment   Patient Orientation Alert and Oriented   Cognition Dementia / Early Alzheimer's   History Provided By Child/Family;Spouse  (Peg Roth [phone] (spouse))   Primary Caregiver Family   Accompanied By/Relationship spouse and son at bedside   Support Systems Spouse/Significant Other;Children   Patient's Healthcare Decision Maker is: Legal Next of Kin  (Peg Roth [phone] (spouse))   PCP Verified by CM Yes  (Glenys Rhodes MD [phone])   Last Visit to PCP Within last 3 months   Prior Functional Level Assistance with the following:;Toileting;Dressing;Cooking;Housework   Current Functional Level Assistance with the following:;Toileting;Dressing;Cooking;Housework   Can patient return to prior living arrangement Unknown at present   Ability

## 2025-04-17 NOTE — PLAN OF CARE
Problem: Occupational Therapy - Adult  Goal: By Discharge: Performs self-care activities at highest level of function for planned discharge setting.  See evaluation for individualized goals.  Description: FUNCTIONAL STATUS PRIOR TO ADMISSION:  patient unable to provide hx and family not present, per chart review hx of dementia and Parkinson's, confused at baseline but admitted with increased confusion from baseline   ,  ,  ,  ,  ,  ,  ,  ,  ,  ,       HOME SUPPORT: Patient lived with his wife per  chart.    Occupational Therapy Goals:  Initiated 4/17/2025  1.  Patient will perform self-feeding with Moderate Assist within 7 day(s).  2.  Patient will follow 1 step commands > or = 50% within 7 day(s).  3.  Patient will maintain attention to functional task > or = 2 minutes within 7 day(s).  4.  Patient will sit edge of bed > or = 10 minutes with SBA for functional task within 7 day(s).  5.  Patient will stand pivot transfer to bedside commode or chair with Minimal Assist and Assist x2 within 7 day(s).    Outcome: Progressing     OCCUPATIONAL THERAPY EVALUATION    Patient: Aureliano Roth (74 y.o. male)  Date: 4/17/2025  Primary Diagnosis: Acute cystitis without hematuria [N30.00]  Altered mental status, unspecified altered mental status type [R41.82]  AMS (altered mental status) [R41.82]         Precautions:                    ASSESSMENT :  The patient is limited by decreased functional mobility, independence in ADLs, high-level IADLs, ROM, activity tolerance, safety awareness, cognition, command following, attention/concentration, fine-motor control.    Based on the impairments listed above he is received with left LE over edge of bed and appeared attempting to sit up. He is observed raising Ue's over head however unable to actively use for functional task. Noted increased tone and resistance to movement with attempts to facilitate bridge, roll or repositioning in bed. Ue tremors noted and discussed Parkinson's

## 2025-04-17 NOTE — ACP (ADVANCE CARE PLANNING)
[facility]                                   Advance Care Planning Note    Name: Aureliano Roth  YOB: 1950  MRN: [id number]  Admission Date: 4/16/2025  5:21 PM    Date of discussion: 4/17/2025    Active Diagnoses:    Principal Problem:    AMS (altered mental status)  Resolved Problems:    * No resolved hospital problems. *  UTI    These active diagnoses are of sufficient risk that focused discussion on advance care planning is indicated in order to allow the patient to thoughtfully consider personal goals of care, and if situations arise that prevent the ability to personally give input, to ensure appropriate representation of their personal desires for different levels and aggressiveness of care.     Discussion:     Persons present and participating in discussion: Aureliano E Roth, Vini Pruitt MD, Peg Roth.     Topics Discussed:  Patient's medical condition and diagnosis: [ x ] yes [  ] no   Surrogate decision maker: [x ] yes [  ] no   Patient's current physical function/cognitive function/frailty: [ x ] yes [  ] no   Code Status: [ x ] yes [  ] no   Artificial Nutrition / Dialysis / Non-Invasive Ventilation / Blood Transfusion: [  ] yes [  x] no  Potential Resources for home (durable medical equipment, home nursing, home O2): [  ] yes [ x ] no    Overview of Discussion: Discussed code status, its definition and components. Patient's POA elected to be DNR. Discussed current diagnosis, current plan and expected hospital course. Answered all questions.    Time Spent:     Total time spent face-to-face in education and discussion: 16 minutes.     Vini Pruitt MD  Date of Service:  4/17/2025  4:24 PM

## 2025-04-17 NOTE — PLAN OF CARE
Problem: Physical Therapy - Adult  Goal: By Discharge: Performs mobility at highest level of function for planned discharge setting.  See evaluation for individualized goals.  Description: FUNCTIONAL STATUS PRIOR TO ADMISSION: Prior level of function unknown on evaluation. Patient with h/o PD and dementia, admitted for altered mental status.     HOME SUPPORT PRIOR TO ADMISSION: The patient lived with spouse who was not present on evaluation.    Physical Therapy Goals  Initiated 4/17/2025  1.  Patient will move from supine to sit and sit to supine, scoot up and down, and roll side to side in bed with contact guard assist within 7 day(s).    2.  Patient will perform sit to stand with contact guard assist within 7 day(s).  3.  Patient will transfer from bed to chair and chair to bed with contact guard assist using the least restrictive device within 7 day(s).  4.  Patient will ambulate with contact guard assist for 50 feet with the least restrictive device within 7 day(s).   5.  Patient will progress to stair training if applicable within 7 day(s).   Outcome: Not Progressing     PHYSICAL THERAPY EVALUATION    Patient: Aureliano Roth (74 y.o. male)  Date: 4/17/2025  Primary Diagnosis: Acute cystitis without hematuria [N30.00]  Altered mental status, unspecified altered mental status type [R41.82]  AMS (altered mental status) [R41.82]       Precautions:              ASSESSMENT :   DEFICITS/IMPAIRMENTS:   The patient is limited by decreased functional mobility, independence in ADLs, high-level IADLs, ROM, strength, activity tolerance, safety awareness, cognition, command following, attention/concentration, coordination, fine-motor control s/p admission for AMS.   *Later discussed with family, patient is ambulatory with and without a RW and fairly independent to Min A for ADLs. Family has noticed a gradual decline in his cognitive and physical function x 1 month prior to admission.    Based on the impairments listed

## 2025-04-17 NOTE — ACP (ADVANCE CARE PLANNING)
[facility]                                   Advance Care Planning Note    Name: Aureliano Roth  YOB: 1950  MRN: [id number]  Admission Date: 4/16/2025  5:21 PM    Date of discussion: 4/16/2025    Active Diagnoses:        These active diagnoses are of sufficient risk that focused discussion on advance care planning is indicated in order to allow the patient to thoughtfully consider personal goals of care, and if situations arise that prevent the ability to personally give input, to ensure appropriate representation of their personal desires for different levels and aggressiveness of care.     Discussion:     Persons present and participating in discussion: Aureliano Roth wife who is POA, Vini De Dios MD,     Topics Discussed:  Patient's medical condition and diagnosis: [ x ] yes [  ] no   Surrogate decision maker: [ x ] yes [  ] no   Patient's current physical function/cognitive function/frailty: [x ] yes [  ] no   Code Status: [  x] yes [  ] no   Artificial Nutrition / Dialysis / Non-Invasive Ventilation / Blood Transfusion: [ x ] yes [  ] no  Potential Resources for home (durable medical equipment, home nursing, home O2): [  x] yes [  ] no    Overview of Discussion: Patient's wife reported she would like patient currently to be full code, and she would like to discuss more with her son    Time Spent:     Total time spent face-to-face in education and discussion: 17 minutes.     Vini De Dios MD  Date of Service:  4/16/2025  8:48 PM

## 2025-04-17 NOTE — PLAN OF CARE
Patient admitted for increased confusion and weakness with recent GLF at home related to UTI. HX of dementia and parkinson's so patient's baseline is confused but wife who is at bedside states he's more confused than usual. Virtual sitter  set up for extra safety due to confusion/high fall risk. Other fall precautions in place include bed alarm, yellow armband, fall sign posted on door. Will continue to monitor and assess.

## 2025-04-17 NOTE — H&P
Hospitalist Admission Note    NAME:  Aureliano Roth   :  1950   MRN:  [id number]     Date/Time:  2025 8:17 PM    Patient PCP: Glenys Rhodes MD  ________________________________________________________________________    Given the patient's current clinical presentation, I have a high level of concern for decompensation if discharged from the emergency department.  Complex decision making was performed, which includes reviewing the patient's available past medical records, laboratory results, and x-ray films.       My assessment of this patient's clinical condition and my plan of care is as follows.    Assessment / Plan:    74 years old male patient with PMH of BPH, Mobitz type 1 second degree AV block, bradycardia s/p cardiac pacemaker in situ, Typical atrial flutter, Hx of aortic valve insufficiency, Parkinson disease, Dementia due to Parkinson's disease, Hypothyroid, Ankylosing spondylitis, Depression who presented to the emergency room accompanied by his wife today with main complaint of worsening generalized weakness and altered mental status.    # Acute metabolic encephalopathy in settings of Parkinson, and dementia  Secondary to underlying UTI  Patient is not septic  WBCs 18.3  UA positive, urine cultures and blood cultures collected in the ER patient was started empirically on IV Rocephin, in 2019 patient had a culture of Enterococcus  CT head negative, CT spine negative  No meningeal signs on physical exam, less likely neurological cause however still on the differential  Plan  Continue patient empirically on IV Rocephin, pending urine cultures  Follow-up on blood cultures  Check chest x-ray  Follow-up on ammonia/TSH  Consider neurology consult if needed    #Baseline dementia  Continue home donepezil  Continue sleep schedule  Patient is high risk for hospital-acquired delirium  Delirium precautions  - Continue frequent redirection and reorientation, lights on during day, minimize  sundowning with hallucinations in the evenings at home at baseline, however today when he woke up patient's wife reported that she noticed he is weaker than normal.    After presenting to the ED patient was noted to be afebrile, with blood pressure/elevated 186/104, was less concerning for WBCs 18.3, and UA positive there BCs, leukocyte esterase and bacteria    Patient was admitted for IV antibiotics and further workup        Available records were reviewed at the time of H&P.        Past Medical History:   Diagnosis Date    Anxiety     Aortic insufficiency 11/9/2010    Arthritis     neck    Atrial flutter (HCC)     cardioversion 5/18/11    BPH (benign prostatic hyperplasia)     Hypothyroid     Migraines     Other second degree atrioventricular block 11/9/2010      Past Surgical History:   Procedure Laterality Date    CARDIAC CATHETERIZATION  1987    normal coronary arteries normal left ventricular function.     CARDIAC CATHETERIZATION  2011    Normal coronaries    CARDIAC VALVE SURGERY  4/2011    AVR, #[address] [facility] Epic porcine    CATARACT REMOVAL      RIGHT EYE    ECHO 2D ADULT  2/2011    mild LVE, EF 58%, LAE, large Ao root, Severe AI, 2+MR    ECHO 2D ADULT  9/2010    normal LV wall motion and ejection fraction, left ventricular enlargement, left atrial enlargement, severe aortic regurgitation and mild mitral regurgitation. The ejection fraction was 55-60%.     ECHO 2D ADULT  6/2011    LVH, EF 60%, trace -mild MR    GI      inginal hernia repair 2008    HERNIA REPAIR      PACEMAKER  1987    VVI, after stopping \"voltarin\" his block resolved. Subsequently battery depleted in 2002 and was not replaced     Social History     Tobacco Use    Smoking status: Never    Smokeless tobacco: Never   Substance Use Topics    Alcohol use: No      Family History   Problem Relation Age of Onset    Lung Disease Mother         EMPHYSEMA    Cancer Mother         LUNG CA    Stroke Mother     Heart Disease Father         MI    Alcohol

## 2025-04-17 NOTE — PROGRESS NOTES
[facility]  [address] [place] [address] [phone]        Hospitalist Progress Note      NAME: Aureliano Roth   :  1950  MRM:  [id number]    Date/Time of service: 2025  3:19 PM       Subjective:   Patient was personally seen and examined by me during this time period.  Chart reviewed.     Patient does not report any questions. Only occasionally responds to voice.     ROS: per history above       Objective:       Vitals:       Last 24hrs VS reviewed since prior progress note. Most recent are:    Vitals:    25 1128   BP: 115/76   Pulse: 78   Resp: 18   Temp: 99 °F (37.2 °C)   SpO2: 94%     SpO2 Readings from Last 6 Encounters:   25 94%   23 96%          Intake/Output Summary (Last 24 hours) at 2025 1519  Last data filed at 2025 1952  Gross per 24 hour   Intake --   Output 100 ml   Net -100 ml        Exam:     Physical Exam:    Gen:  Well-developed, well-nourished, in no acute distress  HEENT:  Pink conjunctivae, hearing intact to voice, moist mucous membranes  Neck:  Supple  Resp:  No accessory muscle use, clear breath sounds without wheezes rales or rhonchi  Card:  No murmurs, normal S1, S2 without thrills, bruits or peripheral edema  Abd:  Soft, non-tender, non-distended  Skin:  No rashes or ulcers, skin turgor is good  Neuro:  Cranial nerves 3-12 are grossly intact  Psych:  unclear insight and orientation.  Alert.       Medications Reviewed: (see below)    Lab Data Reviewed: (see below)    ______________________________________________________________________    Medications:     Current Facility-Administered Medications   Medication Dose Route Frequency    prochlorperazine (COMPAZINE) injection 10 mg  10 mg IntraVENous Q6H PRN    sodium chloride flush 0.9 % injection 5-40 mL  5-40 mL IntraVENous 2 times per day    sodium chloride flush 0.9 % injection 5-40 mL  5-40 mL IntraVENous PRN    0.9 % sodium chloride infusion    IntraVENous PRN    potassium chloride (KLOR-CON) extended release tablet 40 mEq  40 mEq Oral PRN    Or    potassium bicarb-citric acid (EFFER-K) effervescent tablet 40 mEq  40 mEq Oral PRN    Or    potassium chloride 10 mEq/100 mL IVPB (Peripheral Line)  10 mEq IntraVENous PRN    magnesium sulfate 2000 mg in 50 mL IVPB premix  2,000 mg IntraVENous PRN    enoxaparin (LOVENOX) injection 40 mg  40 mg SubCUTAneous Daily    polyethylene glycol (GLYCOLAX) packet 17 g  17 g Oral Daily PRN    acetaminophen (TYLENOL) tablet 650 mg  650 mg Oral Q6H PRN    Or    acetaminophen (TYLENOL) suppository 650 mg  650 mg Rectal Q6H PRN    0.9 % sodium chloride infusion   IntraVENous Continuous    aspirin EC tablet 325 mg  325 mg Oral Daily    [Held by provider] donepezil (ARICEPT) tablet 10 mg  10 mg Oral QHS    FLUoxetine (PROZAC) capsule 10 mg  10 mg Oral Daily    levothyroxine (SYNTHROID) tablet 112 mcg  112 mcg Oral Daily    cefTRIAXone (ROCEPHIN) 1,000 mg in sterile water 10 mL IV syringe  1,000 mg IntraVENous Q24H    carbidopa-levodopa (SINEMET)  MG per tablet 2 tablet  2 tablet Oral TID    melatonin tablet 6 mg  6 mg Oral Nightly    pantoprazole (PROTONIX) tablet 40 mg  40 mg Oral QAM AC    [Held by provider] QUEtiapine (SEROQUEL) tablet 12.5 mg  12.5 mg Oral QAM    [Held by provider] QUEtiapine (SEROQUEL) tablet 50 mg  50 mg Oral Nightly    fludrocortisone (FLORINEF) tablet 0.1 mg  0.1 mg Oral Daily          Lab Review:     Recent Labs     04/16/25  1801 04/17/25  0923   WBC 18.3* 15.7*   HGB 17.4* 15.7   HCT 52.2* 47.1    147*     Recent Labs     04/16/25  1801 04/17/25  0923    141   K 3.8 4.0    107   CO2 29 25   BUN 19 19   MG 2.1  --    ALT <6*  --      No results found for: \"GLUCPOC\"       Assessment / Plan:     Principal Problem:    AMS (altered mental status)  Resolved Problems:    * No resolved hospital problems. *      74 years old male patient with PMH of BPH, Mobitz type 1 second degree AV

## 2025-04-17 NOTE — FLOWSHEET NOTE
Patient agitated and not able to be redirected. Qtc 633 on most recent EKG. Pharm recommends avoiding antipsychotic and medicating w/ benzo at this time. Will trial dose of Valium IVP. Aricept, Seroquel, Zofran held. Will continue to monitor on tele.

## 2025-04-18 PROBLEM — Z71.89 DNR (DO NOT RESUSCITATE) DISCUSSION: Status: ACTIVE | Noted: 2025-04-18

## 2025-04-18 PROBLEM — Z51.5 PALLIATIVE CARE BY SPECIALIST: Status: ACTIVE | Noted: 2025-04-18

## 2025-04-18 PROBLEM — Z71.89 GOALS OF CARE, COUNSELING/DISCUSSION: Status: ACTIVE | Noted: 2025-04-18

## 2025-04-18 PROBLEM — N30.00 ACUTE CYSTITIS WITHOUT HEMATURIA: Status: ACTIVE | Noted: 2025-04-18

## 2025-04-18 LAB
ALBUMIN SERPL-MCNC: 2.6 G/DL (ref 3.5–5)
ALBUMIN/GLOB SERPL: 0.8 (ref 1.1–2.2)
ALP SERPL-CCNC: 64 U/L (ref 45–117)
ALT SERPL-CCNC: 8 U/L (ref 12–78)
ANION GAP SERPL CALC-SCNC: 4 MMOL/L (ref 2–12)
AST SERPL-CCNC: 32 U/L (ref 15–37)
BILIRUB SERPL-MCNC: 1.2 MG/DL (ref 0.2–1)
BUN SERPL-MCNC: 22 MG/DL (ref 6–20)
BUN/CREAT SERPL: 19 (ref 12–20)
CALCIUM SERPL-MCNC: 8.6 MG/DL (ref 8.5–10.1)
CHLORIDE SERPL-SCNC: 109 MMOL/L (ref 97–108)
CO2 SERPL-SCNC: 24 MMOL/L (ref 21–32)
CREAT SERPL-MCNC: 1.13 MG/DL (ref 0.7–1.3)
GLOBULIN SER CALC-MCNC: 3.3 G/DL (ref 2–4)
GLUCOSE SERPL-MCNC: 94 MG/DL (ref 65–100)
POTASSIUM SERPL-SCNC: 4.6 MMOL/L (ref 3.5–5.1)
PROT SERPL-MCNC: 5.9 G/DL (ref 6.4–8.2)
SODIUM SERPL-SCNC: 137 MMOL/L (ref 136–145)

## 2025-04-18 PROCEDURE — 2580000003 HC RX 258: Performed by: FAMILY MEDICINE

## 2025-04-18 PROCEDURE — 6360000002 HC RX W HCPCS: Performed by: FAMILY MEDICINE

## 2025-04-18 PROCEDURE — 94761 N-INVAS EAR/PLS OXIMETRY MLT: CPT

## 2025-04-18 PROCEDURE — 1100000000 HC RM PRIVATE

## 2025-04-18 PROCEDURE — 6360000002 HC RX W HCPCS: Performed by: STUDENT IN AN ORGANIZED HEALTH CARE EDUCATION/TRAINING PROGRAM

## 2025-04-18 PROCEDURE — 6370000000 HC RX 637 (ALT 250 FOR IP): Performed by: FAMILY MEDICINE

## 2025-04-18 PROCEDURE — 2500000003 HC RX 250 WO HCPCS: Performed by: STUDENT IN AN ORGANIZED HEALTH CARE EDUCATION/TRAINING PROGRAM

## 2025-04-18 PROCEDURE — 80053 COMPREHEN METABOLIC PANEL: CPT

## 2025-04-18 PROCEDURE — 93005 ELECTROCARDIOGRAM TRACING: CPT | Performed by: FAMILY MEDICINE

## 2025-04-18 PROCEDURE — 6370000000 HC RX 637 (ALT 250 FOR IP): Performed by: STUDENT IN AN ORGANIZED HEALTH CARE EDUCATION/TRAINING PROGRAM

## 2025-04-18 RX ORDER — SODIUM CHLORIDE, SODIUM LACTATE, POTASSIUM CHLORIDE, CALCIUM CHLORIDE 600; 310; 30; 20 MG/100ML; MG/100ML; MG/100ML; MG/100ML
INJECTION, SOLUTION INTRAVENOUS CONTINUOUS
Status: DISCONTINUED | OUTPATIENT
Start: 2025-04-18 | End: 2025-04-21

## 2025-04-18 RX ORDER — MIDAZOLAM HYDROCHLORIDE 2 MG/2ML
0.5 INJECTION, SOLUTION INTRAMUSCULAR; INTRAVENOUS EVERY 8 HOURS PRN
Status: DISCONTINUED | OUTPATIENT
Start: 2025-04-18 | End: 2025-04-26

## 2025-04-18 RX ORDER — MIDAZOLAM HYDROCHLORIDE 2 MG/2ML
1 INJECTION, SOLUTION INTRAMUSCULAR; INTRAVENOUS
Status: COMPLETED | OUTPATIENT
Start: 2025-04-18 | End: 2025-04-18

## 2025-04-18 RX ADMIN — Medication 6 MG: at 22:35

## 2025-04-18 RX ADMIN — SODIUM CHLORIDE, PRESERVATIVE FREE 10 ML: 5 INJECTION INTRAVENOUS at 08:44

## 2025-04-18 RX ADMIN — CARBIDOPA AND LEVODOPA 2 TABLET: 25; 100 TABLET ORAL at 22:35

## 2025-04-18 RX ADMIN — ASPIRIN 325 MG: 325 TABLET, COATED ORAL at 08:43

## 2025-04-18 RX ADMIN — CARBIDOPA AND LEVODOPA 2 TABLET: 25; 100 TABLET ORAL at 13:16

## 2025-04-18 RX ADMIN — LEVOTHYROXINE SODIUM 112 MCG: 0.11 TABLET ORAL at 06:19

## 2025-04-18 RX ADMIN — CARBIDOPA AND LEVODOPA 2 TABLET: 25; 100 TABLET ORAL at 08:43

## 2025-04-18 RX ADMIN — SODIUM CHLORIDE, SODIUM LACTATE, POTASSIUM CHLORIDE, AND CALCIUM CHLORIDE: .6; .31; .03; .02 INJECTION, SOLUTION INTRAVENOUS at 08:53

## 2025-04-18 RX ADMIN — WATER 1000 MG: 1 INJECTION INTRAMUSCULAR; INTRAVENOUS; SUBCUTANEOUS at 22:34

## 2025-04-18 RX ADMIN — FLUDROCORTISONE ACETATE 0.2 MG: 0.1 TABLET ORAL at 08:44

## 2025-04-18 RX ADMIN — SODIUM CHLORIDE, PRESERVATIVE FREE 10 ML: 5 INJECTION INTRAVENOUS at 22:34

## 2025-04-18 RX ADMIN — MIDAZOLAM HYDROCHLORIDE 1 MG: 1 INJECTION, SOLUTION INTRAMUSCULAR; INTRAVENOUS at 18:21

## 2025-04-18 RX ADMIN — ENOXAPARIN SODIUM 40 MG: 100 INJECTION SUBCUTANEOUS at 08:44

## 2025-04-18 NOTE — PROGRESS NOTES
Attempted to obtain EKG-pt uncooperative and agitated,pushing tech away; family requested tech come back to obtain.  MD notified.

## 2025-04-18 NOTE — PROGRESS NOTES
[facility]  [address] [place] [address] [phone]        Hospitalist Progress Note      NAME: Aureliano Roth   :  1950  MRM:  [id number]    Date/Time of service: 2025  1:54 PM       Subjective:   Patient was personally seen and examined by me during this time period.  Chart reviewed.     Patient does not report any questions. More responsive today to questions. Denies any complaints.     ROS: per history above       Objective:       Vitals:       Last 24hrs VS reviewed since prior progress note. Most recent are:    Vitals:    25 1135   BP: (!) 147/87   Pulse: 76   Resp: 17   Temp: 97.7 °F (36.5 °C)   SpO2: 97%     SpO2 Readings from Last 6 Encounters:   25 97%   23 96%          Intake/Output Summary (Last 24 hours) at 2025 1354  Last data filed at 2025 1054  Gross per 24 hour   Intake 237 ml   Output --   Net 237 ml        Exam:     Physical Exam:    Gen:  Well-developed, well-nourished, in no acute distress  HEENT:  Pink conjunctivae, hearing intact to voice, moist mucous membranes  Neck:  Supple  Resp:  No accessory muscle use, clear breath sounds without wheezes rales or rhonchi  Card:  No murmurs, normal S1, S2 without thrills, bruits or peripheral edema  Abd:  Soft, non-tender, non-distended  Skin:  No rashes or ulcers, skin turgor is good  Neuro:  Cranial nerves 3-12 are grossly intact  Psych:  unclear insight and orientation.  Alert.       Medications Reviewed: (see below)    Lab Data Reviewed: (see below)    ______________________________________________________________________    Medications:     Current Facility-Administered Medications   Medication Dose Route Frequency    lactated ringers infusion   IntraVENous Continuous    prochlorperazine (COMPAZINE) injection 10 mg  10 mg IntraVENous Q6H PRN    fludrocortisone (FLORINEF) tablet 0.2 mg  0.2 mg Oral Daily    sodium chloride flush 0.9 % injection 5-40 mL  5-40

## 2025-04-18 NOTE — PROGRESS NOTES
RRT note:    Patient given 1 mg IM Lorazapam in R deltoid.  For agitation.  Family member present at the bedside, tech in room.    2043 US PIV started in patient right forearm without difficulty. Patient resting quietly, family at the bedside.    GAURANG Mcgrath RN

## 2025-04-18 NOTE — CONSULTS
Palliative Medicine  Patient Name: Aureliano Roth  YOB: 1950  MRN: [id number]  Age: 74 y.o.  Gender: male    Date of Initial Consult: 4/17/2025  Date of Service: 4/17/2025  Time: 5:00 PM  Provider: BABAK Steele NP  Hospital Day: 3  Admit Date: 4/16/2025  Referring Provider: Dr. Aranda       Reasons for Consultation:  Goals of Care    HISTORY OF PRESENT ILLNESS (HPI):   Aureliano Roth is a 74 y.o. male with a past medical history of BPH, Parkinson's, dementia, intermittent paranoia/delusions/visual hallucination, hypothyroidism, AV insufficiency, s/p AV replacement, depression, HTN, bradycardia s/p pacemaker who presented to the ER with CC of worsening weakness and AMS and admitted on 4/16/2025 with a diagnosis of acute on chronic CHF, bilateral pleural effusions,.     Psychosocial: Born and raised in [facility], served in the [employer] x 4 years.  , 1 son and 3 grandchildren.  Mrs. Roth is her husbands full time/atc caregiver    PALLIATIVE DIAGNOSES:    Palliative care encounter  AMS, unspecified  DNR discussion  Debility  Goals of care discussion    ASSESSMENT AND PLAN:   Palliative consulted for GOC discussion with Mr. Aureliano Roth and family.  Completed chart review and discussed with patients nurse Josie AMAYA  Mr. Roth was sleeping, only waking briefly during visit. His wife and son   Patients wife and son report cognitive and functional decline over past 6 months. See more detailed info above.  Mrs. Roth acknowledges the burden of caregiving atc, she has little respite. Talked about disease progression and further decline and how they would care for him as he becomes increasingly dependent on all care. Recommended they explore potential options for care down the road, hired [facility] vs Facility placement.   Wife would be interested to know if patient might have additional benefits/support  through the [facility] that offers CG support.   DNR discussion- Mrs.  Ira elected DNR, stated her  would NOT want CPR.    Initial consult note routed to primary continuity provider and/or primary health care team members    Please call with any palliative questions or concerns.  Palliative Care Team is available via perfect serve or via phone.    Referrals to:   [] Outpatient Palliative Care  [] Home Based Palliative Care  [] Home Based Primary Care  [] Hospice       ADVANCE CARE PLANNING:   [x] The [employer] [facility] Interdisciplinary Team has updated the ACP Navigator with Health Care Decision Maker and Patient Capacity      Primary Decision Maker (Active): Peg Roth - Spouse - [phone]  Confirm Advance Directive: Yes, not on file    Current Code Status: DNR     Goals of Care:         Please refer to Palliative Medicine ACP notes for further details.    PALLIATIVE ASSESSMENT:      Palliative Performance Scale (PPS):       ECOG:        Modified ESAS:       Clinical Pain Assessment (nonverbal scale for severity on nonverbal patients):           NVPS:       RDOS:         Vital Signs: Blood pressure 137/84, pulse 73, temperature 98.2 °F (36.8 °C), temperature source Oral, resp. rate 17, height 1.829 m (6'), weight 83.9 kg (184 lb 15.5 oz), SpO2 95%.    PHYSICAL ASSESSMENT:   General: [] Oriented x3  [] Well appearing  [] Intubated  [x]Ill appearing  []Other:  Mental Status: [] Normal mental status exam  [] Drowsy  [x] Confused  []Other:  Cardiovascular: [] Regular rate/rhythm  [x] Arrhythmia  [] Other:  Chest: [x] Effort normal  []Lungs clear  [] Respiratory distress  []Tachypnea  [] Other:  Abdomen: [] Soft/non-tender  [] Normal appearance  [] Distended  [] Ascites  [x] Other:large  Neurological: [] Normal speech  [] Normal sensation  [x]Deficits present:  Extremity: [x] Normal skin color/temp  [] Clubbing/cyanosis  [] No edema  [] Other:    Wt Readings from Last 15 Encounters:   04/16/25 83.9 kg (184 lb 15.5 oz)   11/16/23 79.7 kg (175 lb 9.6 oz)   03/25/21 83.9 kg (184 lb

## 2025-04-18 NOTE — PLAN OF CARE
Problem: Risk for Elopement  Goal: Patient will not exit the unit/facility without proper excort  Outcome: Progressing     Problem: Discharge Planning  Goal: Discharge to home or other facility with appropriate resources  Outcome: Progressing     Problem: Safety - Adult  Goal: Free from fall injury  Outcome: Progressing     Problem: Confusion  Goal: Confusion, delirium, dementia, or psychosis is improved or at baseline  Description: INTERVENTIONS:1. Assess for possible contributors to thought disturbance, including medications, impaired vision or hearing, underlying metabolic abnormalities, dehydration, psychiatric diagnoses, and notify attending LIP2. [place] high risk fall precautions, as indicated3. Provide frequent short contacts to provide reality reorientation, refocusing and direction4. Decrease environmental stimuli, including noise as appropriate5. Monitor and intervene to maintain adequate nutrition, hydration, elimination, sleep and activity6. If unable to ensure safety without constant attention obtain sitter and review sitter guidelines with assigned personnel7. Initiate Psychosocial CNS and Spiritual Care consult, as indicated  INTERVENTIONS:1. Assess for possible contributors to thought disturbance, including medications, impaired vision or hearing, underlying metabolic abnormalities, dehydration, psychiatric diagnoses, and notify attending LIP2. [place] high risk fall precautions, as indicated3. Provide frequent short contacts to provide reality reorientation, refocusing and direction4. Decrease environmental stimuli, including noise as appropriate5. Monitor and intervene to maintain adequate nutrition, hydration, elimination, sleep and activity6. If unable to ensure safety without constant attention obtain sitter and review sitter guidelines with assigned personnel7. Initiate Psychosocial CNS and Spiritual Care consult, as indicated  Outcome: Progressing     Problem: Pain  Goal:  Verbalizes/displays adequate comfort level or baseline comfort level  Outcome: Progressing     Problem: Skin/Tissue Integrity  Goal: Skin integrity remains intact  Description: 1.  Monitor for areas of redness and/or skin breakdown2.  Assess vascular access sites hourly3.  Every 4-6 hours minimum:  Change oxygen saturation probe site4.  Every 4-6 hours:  If on nasal continuous positive airway pressure, respiratory therapy assess nares and determine need for appliance change or resting period  Outcome: Progressing

## 2025-04-18 NOTE — PROGRESS NOTES
Occupational Therapy and Physical Therapy  Chart reviewed for tx and discussed with RN. Per RN patient remains confused, not following commands and with increased agitation with efforts to encourage participation. Will defer at this time.   Gabbi Monreal, OTR/L

## 2025-04-19 LAB
ALBUMIN SERPL-MCNC: 2.7 G/DL (ref 3.5–5)
ALBUMIN/GLOB SERPL: 0.8 (ref 1.1–2.2)
ALP SERPL-CCNC: 52 U/L (ref 45–117)
ALT SERPL-CCNC: 8 U/L (ref 12–78)
ANION GAP SERPL CALC-SCNC: 6 MMOL/L (ref 2–12)
AST SERPL-CCNC: 25 U/L (ref 15–37)
BACTERIA SPEC CULT: ABNORMAL
BASOPHILS # BLD: 0 K/UL (ref 0–0.1)
BASOPHILS NFR BLD: 0 % (ref 0–1)
BILIRUB SERPL-MCNC: 0.9 MG/DL (ref 0.2–1)
BUN SERPL-MCNC: 24 MG/DL (ref 6–20)
BUN/CREAT SERPL: 23 (ref 12–20)
CALCIUM SERPL-MCNC: 8.8 MG/DL (ref 8.5–10.1)
CC UR VC: ABNORMAL
CHLORIDE SERPL-SCNC: 107 MMOL/L (ref 97–108)
CO2 SERPL-SCNC: 29 MMOL/L (ref 21–32)
CREAT SERPL-MCNC: 1.06 MG/DL (ref 0.7–1.3)
DIFFERENTIAL METHOD BLD: ABNORMAL
EOSINOPHIL # BLD: 0 K/UL (ref 0–0.4)
EOSINOPHIL NFR BLD: 0 % (ref 0–7)
ERYTHROCYTE [DISTWIDTH] IN BLOOD BY AUTOMATED COUNT: 12.2 % (ref 11.5–14.5)
GLOBULIN SER CALC-MCNC: 3.3 G/DL (ref 2–4)
GLUCOSE SERPL-MCNC: 96 MG/DL (ref 65–100)
HCT VFR BLD AUTO: 49.1 % (ref 36.6–50.3)
HGB BLD-MCNC: 15.9 G/DL (ref 12.1–17)
IMM GRANULOCYTES # BLD AUTO: 0 K/UL
IMM GRANULOCYTES NFR BLD AUTO: 0 %
LYMPHOCYTES # BLD: 0.34 K/UL (ref 0.8–3.5)
LYMPHOCYTES NFR BLD: 7 % (ref 12–49)
MCH RBC QN AUTO: 28.9 PG (ref 26–34)
MCHC RBC AUTO-ENTMCNC: 32.4 G/DL (ref 30–36.5)
MCV RBC AUTO: 89.3 FL (ref 80–99)
MONOCYTES # BLD: 0.78 K/UL (ref 0–1)
MONOCYTES NFR BLD: 16 % (ref 5–13)
NEUTS BAND NFR BLD MANUAL: 11 % (ref 0–6)
NEUTS SEG # BLD: 3.78 K/UL (ref 1.8–8)
NEUTS SEG NFR BLD: 66 % (ref 32–75)
NRBC # BLD: 0 K/UL (ref 0–0.01)
NRBC BLD-RTO: 0 PER 100 WBC
PLATELET # BLD AUTO: 180 K/UL (ref 150–400)
PMV BLD AUTO: 11.2 FL (ref 8.9–12.9)
POTASSIUM SERPL-SCNC: 3.9 MMOL/L (ref 3.5–5.1)
PROT SERPL-MCNC: 6 G/DL (ref 6.4–8.2)
RBC # BLD AUTO: 5.5 M/UL (ref 4.1–5.7)
RBC MORPH BLD: ABNORMAL
SERVICE CMNT-IMP: ABNORMAL
SODIUM SERPL-SCNC: 142 MMOL/L (ref 136–145)
WBC # BLD AUTO: 4.9 K/UL (ref 4.1–11.1)

## 2025-04-19 PROCEDURE — 6360000002 HC RX W HCPCS: Performed by: FAMILY MEDICINE

## 2025-04-19 PROCEDURE — 2500000003 HC RX 250 WO HCPCS: Performed by: STUDENT IN AN ORGANIZED HEALTH CARE EDUCATION/TRAINING PROGRAM

## 2025-04-19 PROCEDURE — 94761 N-INVAS EAR/PLS OXIMETRY MLT: CPT

## 2025-04-19 PROCEDURE — 85025 COMPLETE CBC W/AUTO DIFF WBC: CPT

## 2025-04-19 PROCEDURE — 2580000003 HC RX 258: Performed by: FAMILY MEDICINE

## 2025-04-19 PROCEDURE — 6370000000 HC RX 637 (ALT 250 FOR IP): Performed by: FAMILY MEDICINE

## 2025-04-19 PROCEDURE — 6370000000 HC RX 637 (ALT 250 FOR IP): Performed by: NURSE PRACTITIONER

## 2025-04-19 PROCEDURE — 6370000000 HC RX 637 (ALT 250 FOR IP): Performed by: STUDENT IN AN ORGANIZED HEALTH CARE EDUCATION/TRAINING PROGRAM

## 2025-04-19 PROCEDURE — 2500000003 HC RX 250 WO HCPCS: Performed by: FAMILY MEDICINE

## 2025-04-19 PROCEDURE — 1100000000 HC RM PRIVATE

## 2025-04-19 PROCEDURE — 99232 SBSQ HOSP IP/OBS MODERATE 35: CPT | Performed by: NURSE PRACTITIONER

## 2025-04-19 PROCEDURE — 95813 EEG EXTND MNTR 61-119 MIN: CPT | Performed by: PSYCHIATRY & NEUROLOGY

## 2025-04-19 PROCEDURE — 6360000002 HC RX W HCPCS: Performed by: STUDENT IN AN ORGANIZED HEALTH CARE EDUCATION/TRAINING PROGRAM

## 2025-04-19 PROCEDURE — 36415 COLL VENOUS BLD VENIPUNCTURE: CPT

## 2025-04-19 PROCEDURE — 80053 COMPREHEN METABOLIC PANEL: CPT

## 2025-04-19 RX ORDER — LACTOBACILLUS RHAMNOSUS GG 10B CELL
1 CAPSULE ORAL
Status: DISCONTINUED | OUTPATIENT
Start: 2025-04-20 | End: 2025-04-27 | Stop reason: HOSPADM

## 2025-04-19 RX ORDER — TAMSULOSIN HYDROCHLORIDE 0.4 MG/1
0.4 CAPSULE ORAL DAILY
Status: DISCONTINUED | OUTPATIENT
Start: 2025-04-19 | End: 2025-04-19

## 2025-04-19 RX ORDER — AMANTADINE HYDROCHLORIDE 100 MG/1
100 CAPSULE, GELATIN COATED ORAL 2 TIMES DAILY
Status: DISCONTINUED | OUTPATIENT
Start: 2025-04-19 | End: 2025-04-22

## 2025-04-19 RX ADMIN — WATER 1000 MG: 1 INJECTION INTRAMUSCULAR; INTRAVENOUS; SUBCUTANEOUS at 21:46

## 2025-04-19 RX ADMIN — FLUDROCORTISONE ACETATE 0.2 MG: 0.1 TABLET ORAL at 08:24

## 2025-04-19 RX ADMIN — SODIUM CHLORIDE, PRESERVATIVE FREE 10 ML: 5 INJECTION INTRAVENOUS at 08:25

## 2025-04-19 RX ADMIN — CARBIDOPA AND LEVODOPA 2 TABLET: 25; 100 TABLET ORAL at 14:00

## 2025-04-19 RX ADMIN — CARBIDOPA AND LEVODOPA 2 TABLET: 25; 100 TABLET ORAL at 08:24

## 2025-04-19 RX ADMIN — SODIUM CHLORIDE, PRESERVATIVE FREE 10 ML: 5 INJECTION INTRAVENOUS at 21:47

## 2025-04-19 RX ADMIN — ENOXAPARIN SODIUM 40 MG: 100 INJECTION SUBCUTANEOUS at 08:24

## 2025-04-19 RX ADMIN — FLUOXETINE HYDROCHLORIDE 10 MG: 10 CAPSULE ORAL at 08:24

## 2025-04-19 RX ADMIN — AMANTADINE HYDROCHLORIDE 100 MG: 100 CAPSULE ORAL at 21:47

## 2025-04-19 RX ADMIN — CARBIDOPA AND LEVODOPA 2 TABLET: 25; 100 TABLET ORAL at 21:47

## 2025-04-19 RX ADMIN — SODIUM CHLORIDE, SODIUM LACTATE, POTASSIUM CHLORIDE, AND CALCIUM CHLORIDE: .6; .31; .03; .02 INJECTION, SOLUTION INTRAVENOUS at 06:30

## 2025-04-19 RX ADMIN — ASPIRIN 325 MG: 325 TABLET, COATED ORAL at 08:24

## 2025-04-19 RX ADMIN — Medication 6 MG: at 21:47

## 2025-04-19 RX ADMIN — AMANTADINE HYDROCHLORIDE 100 MG: 100 CAPSULE ORAL at 15:24

## 2025-04-19 NOTE — PROGRESS NOTES
[facility]  [address] [place] [address] [phone]        Hospitalist Progress Note      NAME: Aureliano Roth   :  1950  MRM:  [id number]    Date/Time of service: 2025  1:59 PM       Subjective:   Patient was personally seen and examined by me during this time period.  Chart reviewed.     Patient is occasionally groaning and mumbling to himself. Does not answer questions.     ROS: per history above       Objective:       Vitals:       Last 24hrs VS reviewed since prior progress note. Most recent are:    Vitals:    25 1143   BP: 128/86   Pulse: 73   Resp: 17   Temp: 98.2 °F (36.8 °C)   SpO2: 96%     SpO2 Readings from Last 6 Encounters:   25 96%   23 96%          Intake/Output Summary (Last 24 hours) at 2025 1359  Last data filed at 2025 1323  Gross per 24 hour   Intake 124 ml   Output --   Net 124 ml        Exam:     Physical Exam:    Gen:  Well-developed, well-nourished, patient is agitated.   HEENT:  Pink conjunctivae, moist mucous membranes  Neck:  Supple  Resp:  No accessory muscle use, clear breath sounds without wheezes rales or rhonchi  Card:  No murmurs, normal S1, S2 without thrills, bruits or peripheral edema  Abd:  Soft, non-tender, non-distended  Skin:  No rashes or ulcers, skin turgor is good  Neuro:  Cranial nerves 3-12 are grossly intact  Psych:  unclear insight and orientation.  Very agitated and does not follow commands.       Medications Reviewed: (see below)    Lab Data Reviewed: (see below)    ______________________________________________________________________    Medications:     Current Facility-Administered Medications   Medication Dose Route Frequency    amantadine (SYMMETREL) capsule 100 mg  100 mg Oral BID    lactated ringers infusion   IntraVENous Continuous    midazolam PF (VERSED) injection 0.5 mg  0.5 mg IntraVENous Q8H PRN    prochlorperazine (COMPAZINE) injection 10 mg  10 mg IntraVENous

## 2025-04-19 NOTE — PLAN OF CARE
Problem: Risk for Elopement  Goal: Patient will not exit the unit/facility without proper excort  Outcome: Not Progressing     Problem: Discharge Planning  Goal: Discharge to home or other facility with appropriate resources  Outcome: Not Progressing     Problem: Safety - Adult  Goal: Free from fall injury  Outcome: Not Progressing     Problem: Confusion  Goal: Confusion, delirium, dementia, or psychosis is improved or at baseline  Description: INTERVENTIONS:1. Assess for possible contributors to thought disturbance, including medications, impaired vision or hearing, underlying metabolic abnormalities, dehydration, psychiatric diagnoses, and notify attending LIP2. [place] high risk fall precautions, as indicated3. Provide frequent short contacts to provide reality reorientation, refocusing and direction4. Decrease environmental stimuli, including noise as appropriate5. Monitor and intervene to maintain adequate nutrition, hydration, elimination, sleep and activity6. If unable to ensure safety without constant attention obtain sitter and review sitter guidelines with assigned personnel7. Initiate Psychosocial CNS and Spiritual Care consult, as indicated  INTERVENTIONS:1. Assess for possible contributors to thought disturbance, including medications, impaired vision or hearing, underlying metabolic abnormalities, dehydration, psychiatric diagnoses, and notify attending LIP2. [place] high risk fall precautions, as indicated3. Provide frequent short contacts to provide reality reorientation, refocusing and direction4. Decrease environmental stimuli, including noise as appropriate5. Monitor and intervene to maintain adequate nutrition, hydration, elimination, sleep and activity6. If unable to ensure safety without constant attention obtain sitter and review sitter guidelines with assigned personnel7. Initiate Psychosocial CNS and Spiritual Care consult, as indicated  Outcome: Not Progressing     Problem:

## 2025-04-19 NOTE — CARE COORDINATION
Care Management Progress Note    Reason for Admission:   Acute cystitis without hematuria [N30.00]  Altered mental status, unspecified altered mental status type [R41.82]  AMS (altered mental status) [R41.82]         Patient Admission Status: Inpatient  RUR: 12%  Hospitalization in the last 30 days (Readmission):  No        Transition of care plan:  Ongoing medical management.  Virtual Sitter at bedside.  UTI/sepsis.  Neuro following.  Periods of agitation.  Hx Parkinson's and Dementia.     Discharge Plan:  TBD.   Patient's spouse is requesting a referral to be sent to [facility], when patient is able to participate with therapy.  She stated that this is not patient's baseline.  She stated that patient is able to ambulate independently and sometimes uses a cane.  She also stated that patient is able to feed himself, prior to this admission.      B.  She requested to hold off on sending referral to [facility], until patient can participate with therapy.     C.  IPR, SNF, & Personal Caregiver list provided to patient's wife for review.  She prefers [facility] than SNF, as she feels that [facility] will work with his Parkinson and Dementia.       Discharge plan communicated with patient and/or discharge caregiver: Yes.  CM met with patient & spouse at bedside today.  CM fully discussed DCP with patient's spouse.      Date last IMM letter given: 4/18/2025    Outpatient follow-up:  TBD    Transport at discharge: Stretcher      Will continue to follow.    ______________________________________  KAMRAN Sanabria, RN-CM  [facility]- Care Management  Available via "[employer]  4/19/2025  3:19 PM

## 2025-04-19 NOTE — CONSULTS
[facility]: [facility]  Daly Bear, MSHA, CNRN, ACNP-BC  [facility]  [address]  [phone]      Name:   Aureliano Roth   Medical record #: [id number]  Admission Date: 4/16/2025       Consult requested by: Vini Pruitt MD     Reason for Consult:  Altered mental status    HISTORY OF PRESENT ILLNESS:     Aureliano Roth is a 74 y.o. male who presented to the ED on 4/16/2025 with a several day history of progressive confusion.  According to his wife at home he is awake and conversant oriented to his name, able to ambulate around the house independently, the does need assistance with bathing dressing and medication management.  Wife does endorse episodes of confusion in the evenings at home.  She tells me that she brought him in on the 16th because he had become weak and was falling at home.  Since admission he has had multiple episodes of agitation.  He was admitted to our hospitalist team and treatment was begun for a urinary tract infection however patient has now had 4 days of treatment without improvement in mentation.    Review of vital signs shows that he had a temperature of 102.2 on 417 but has been afebrile since, blood pressures have been normotensive, review of labs for today shows a sodium of 142, creatinine of 1.06, glucose of 96, decreased LFTs, white count 4.9, hemoglobin 15.9, platelets of 180, B12 level of 214, ammonia of 12 on 4/16/2025.  Since admission he has received the following pertinent medications:    Valium 5 mg on 4/16/2025  Seroquel 12.5 mg in the morning and 50 mg nightly, last given on 4/16/2025  Versed 1 mg IM on 4/18/2025    The Neurology Service is asked to evaluate for continued altered mental status.    He is followed by Dr. Martin at [facility], unfortunately we are unable to view office notes from Mr. Roth's last visit.    Neuro-imaging:     CT Head: Completed on 4/16/2025 with no acute process      EKG: Atrial  paced.    Plan of Care discussed with:  Patient not participation due to AMS and Spouse at bedside         Impression/ Plan:      1.  Altered Mental Status:    Neurochecks:  Every 4 hours  Cerebellar for 1 hour now, will get formal EEG to rule out subclinical seizures on 4/21/2025  Restart home amantadine  Patient cannot have MRI due to pacemaker    Thank you for allowing the Neurology Service the pleasure of participating in the care of your patient.  Will follow.       Patient Vitals for the past 8 hrs:   Temp Pulse Resp BP SpO2   04/19/25 1143 98.2 °F (36.8 °C) 73 17 128/86 96 %   04/19/25 0842 97.5 °F (36.4 °C) 73 18 (!) 148/82 93 %           NEUROLOGICAL EXAM:  Resting quietly in bed, lethargic, with light tactile stimulation will open his eyes and tell me that his name is \"Aureliano\" does not answer other orientation questions or follow commands, face symmetric, pupils equal and reactive, observed moving all 4 extremities.  No evidence of cogwheel rigidity, significant facemask.      Allergies:   Allergies   Allergen Reactions    Sulfisoxazole Rash       Outpatient Meds  No current facility-administered medications on file prior to encounter.     Current Outpatient Medications on File Prior to Encounter   Medication Sig Dispense Refill    QUEtiapine (SEROQUEL) 25 MG tablet Take 0.5 tablets by mouth every morning      QUEtiapine (SEROQUEL) 25 MG tablet Take 2 tablets by mouth nightly      aspirin 81 MG EC tablet Take 325 mg by mouth every morning      carbidopa-levodopa (SINEMET)  MG per tablet Take 2 tablets by mouth 3 times daily      donepezil (ARICEPT) 10 MG tablet Take 1 tablet by mouth every morning      fludrocortisone (FLORINEF) 0.1 MG tablet Take 1 tablet by mouth every morning      FLUoxetine HCl, PMDD, 10 MG TABS Take 1 tablet by mouth every morning      levothyroxine (SYNTHROID) 112 MCG tablet Take 1 tablet by mouth every morning ceived the following from [facility]: Outside

## 2025-04-19 NOTE — PROGRESS NOTES
MRI form completed and tubed to Radiology,copy placed in pt's chart.  Was notified due to pacemaker pt will need to have MRI during the week.    1818-Received call from MRI to notify pt is unable to receive MRI due to pacemaker is incompatible,would not be able to turn off with [employer]-provider notified.

## 2025-04-20 LAB
ALBUMIN SERPL-MCNC: 2.4 G/DL (ref 3.5–5)
ALBUMIN/GLOB SERPL: 1 (ref 1.1–2.2)
ALP SERPL-CCNC: 43 U/L (ref 45–117)
ALT SERPL-CCNC: 6 U/L (ref 12–78)
ANION GAP SERPL CALC-SCNC: 3 MMOL/L (ref 2–12)
AST SERPL-CCNC: 17 U/L (ref 15–37)
BASOPHILS # BLD: 0.05 K/UL (ref 0–0.1)
BASOPHILS NFR BLD: 1.2 % (ref 0–1)
BILIRUB SERPL-MCNC: 0.7 MG/DL (ref 0.2–1)
BUN SERPL-MCNC: 20 MG/DL (ref 6–20)
BUN/CREAT SERPL: 20 (ref 12–20)
CALCIUM SERPL-MCNC: 7.8 MG/DL (ref 8.5–10.1)
CHLORIDE SERPL-SCNC: 114 MMOL/L (ref 97–108)
CO2 SERPL-SCNC: 28 MMOL/L (ref 21–32)
CREAT SERPL-MCNC: 1 MG/DL (ref 0.7–1.3)
DIFFERENTIAL METHOD BLD: ABNORMAL
EKG ATRIAL RATE: 83 BPM
EKG ATRIAL RATE: 88 BPM
EKG DIAGNOSIS: NORMAL
EKG DIAGNOSIS: NORMAL
EKG P AXIS: 76 DEGREES
EKG P-R INTERVAL: 176 MS
EKG P-R INTERVAL: 200 MS
EKG Q-T INTERVAL: 412 MS
EKG Q-T INTERVAL: 418 MS
EKG QRS DURATION: 172 MS
EKG QRS DURATION: 176 MS
EKG QTC CALCULATION (BAZETT): 491 MS
EKG QTC CALCULATION (BAZETT): 498 MS
EKG R AXIS: -70 DEGREES
EKG R AXIS: -74 DEGREES
EKG T AXIS: 93 DEGREES
EKG T AXIS: 98 DEGREES
EKG VENTRICULAR RATE: 83 BPM
EKG VENTRICULAR RATE: 88 BPM
EOSINOPHIL # BLD: 0.28 K/UL (ref 0–0.4)
EOSINOPHIL NFR BLD: 6.6 % (ref 0–7)
ERYTHROCYTE [DISTWIDTH] IN BLOOD BY AUTOMATED COUNT: 12.2 % (ref 11.5–14.5)
GLOBULIN SER CALC-MCNC: 2.3 G/DL (ref 2–4)
GLUCOSE SERPL-MCNC: 84 MG/DL (ref 65–100)
HCT VFR BLD AUTO: 45.1 % (ref 36.6–50.3)
HGB BLD-MCNC: 14.8 G/DL (ref 12.1–17)
IMM GRANULOCYTES # BLD AUTO: 0.03 K/UL (ref 0–0.04)
IMM GRANULOCYTES NFR BLD AUTO: 0.7 % (ref 0–0.5)
LYMPHOCYTES # BLD: 0.82 K/UL (ref 0.8–3.5)
LYMPHOCYTES NFR BLD: 19.2 % (ref 12–49)
MCH RBC QN AUTO: 29.1 PG (ref 26–34)
MCHC RBC AUTO-ENTMCNC: 32.8 G/DL (ref 30–36.5)
MCV RBC AUTO: 88.8 FL (ref 80–99)
MONOCYTES # BLD: 0.81 K/UL (ref 0–1)
MONOCYTES NFR BLD: 19 % (ref 5–13)
NEUTS SEG # BLD: 2.27 K/UL (ref 1.8–8)
NEUTS SEG NFR BLD: 53.3 % (ref 32–75)
NRBC # BLD: 0 K/UL (ref 0–0.01)
NRBC BLD-RTO: 0 PER 100 WBC
PLATELET # BLD AUTO: 187 K/UL (ref 150–400)
PMV BLD AUTO: 10.5 FL (ref 8.9–12.9)
POTASSIUM SERPL-SCNC: 3.7 MMOL/L (ref 3.5–5.1)
PROT SERPL-MCNC: 4.7 G/DL (ref 6.4–8.2)
RBC # BLD AUTO: 5.08 M/UL (ref 4.1–5.7)
SODIUM SERPL-SCNC: 145 MMOL/L (ref 136–145)
WBC # BLD AUTO: 4.3 K/UL (ref 4.1–11.1)

## 2025-04-20 PROCEDURE — 6370000000 HC RX 637 (ALT 250 FOR IP): Performed by: STUDENT IN AN ORGANIZED HEALTH CARE EDUCATION/TRAINING PROGRAM

## 2025-04-20 PROCEDURE — 2500000003 HC RX 250 WO HCPCS: Performed by: FAMILY MEDICINE

## 2025-04-20 PROCEDURE — 95813 EEG EXTND MNTR 61-119 MIN: CPT

## 2025-04-20 PROCEDURE — XX20X89 MONITORING OF BRAIN ELECTRICAL ACTIVITY, COMPUTER-AIDED DETECTION AND NOTIFICATION, NEW TECHNOLOGY GROUP 9: ICD-10-PCS | Performed by: PSYCHIATRY & NEUROLOGY

## 2025-04-20 PROCEDURE — 1100000000 HC RM PRIVATE

## 2025-04-20 PROCEDURE — 94761 N-INVAS EAR/PLS OXIMETRY MLT: CPT

## 2025-04-20 PROCEDURE — 6370000000 HC RX 637 (ALT 250 FOR IP): Performed by: NURSE PRACTITIONER

## 2025-04-20 PROCEDURE — 2500000003 HC RX 250 WO HCPCS: Performed by: STUDENT IN AN ORGANIZED HEALTH CARE EDUCATION/TRAINING PROGRAM

## 2025-04-20 PROCEDURE — 99233 SBSQ HOSP IP/OBS HIGH 50: CPT | Performed by: NURSE PRACTITIONER

## 2025-04-20 PROCEDURE — 6370000000 HC RX 637 (ALT 250 FOR IP): Performed by: FAMILY MEDICINE

## 2025-04-20 PROCEDURE — 6360000002 HC RX W HCPCS: Performed by: FAMILY MEDICINE

## 2025-04-20 PROCEDURE — 93010 ELECTROCARDIOGRAM REPORT: CPT | Performed by: HOSPITALIST

## 2025-04-20 PROCEDURE — 6360000002 HC RX W HCPCS: Performed by: STUDENT IN AN ORGANIZED HEALTH CARE EDUCATION/TRAINING PROGRAM

## 2025-04-20 PROCEDURE — 36415 COLL VENOUS BLD VENIPUNCTURE: CPT

## 2025-04-20 PROCEDURE — 80053 COMPREHEN METABOLIC PANEL: CPT

## 2025-04-20 PROCEDURE — 2580000003 HC RX 258: Performed by: FAMILY MEDICINE

## 2025-04-20 PROCEDURE — 85025 COMPLETE CBC W/AUTO DIFF WBC: CPT

## 2025-04-20 RX ADMIN — CARBIDOPA AND LEVODOPA 2 TABLET: 25; 100 TABLET ORAL at 07:46

## 2025-04-20 RX ADMIN — WATER 1000 MG: 1 INJECTION INTRAMUSCULAR; INTRAVENOUS; SUBCUTANEOUS at 21:13

## 2025-04-20 RX ADMIN — Medication 6 MG: at 21:13

## 2025-04-20 RX ADMIN — MICONAZOLE NITRATE: 2 POWDER TOPICAL at 07:52

## 2025-04-20 RX ADMIN — SODIUM CHLORIDE, PRESERVATIVE FREE 10 ML: 5 INJECTION INTRAVENOUS at 07:56

## 2025-04-20 RX ADMIN — AMANTADINE HYDROCHLORIDE 100 MG: 100 CAPSULE ORAL at 07:46

## 2025-04-20 RX ADMIN — MICONAZOLE NITRATE: 2 POWDER TOPICAL at 21:21

## 2025-04-20 RX ADMIN — SODIUM CHLORIDE, PRESERVATIVE FREE 10 ML: 5 INJECTION INTRAVENOUS at 21:13

## 2025-04-20 RX ADMIN — Medication 1 CAPSULE: at 07:46

## 2025-04-20 RX ADMIN — AMANTADINE HYDROCHLORIDE 100 MG: 100 CAPSULE ORAL at 21:13

## 2025-04-20 RX ADMIN — CARBIDOPA AND LEVODOPA 2 TABLET: 25; 100 TABLET ORAL at 14:55

## 2025-04-20 RX ADMIN — CARBIDOPA AND LEVODOPA 2 TABLET: 25; 100 TABLET ORAL at 21:13

## 2025-04-20 RX ADMIN — SODIUM CHLORIDE, SODIUM LACTATE, POTASSIUM CHLORIDE, AND CALCIUM CHLORIDE: .6; .31; .03; .02 INJECTION, SOLUTION INTRAVENOUS at 13:45

## 2025-04-20 RX ADMIN — DONEPEZIL HYDROCHLORIDE 10 MG: 5 TABLET, FILM COATED ORAL at 21:13

## 2025-04-20 RX ADMIN — ENOXAPARIN SODIUM 40 MG: 100 INJECTION SUBCUTANEOUS at 07:45

## 2025-04-20 RX ADMIN — FLUDROCORTISONE ACETATE 0.2 MG: 0.1 TABLET ORAL at 07:50

## 2025-04-20 RX ADMIN — FLUOXETINE HYDROCHLORIDE 10 MG: 10 CAPSULE ORAL at 07:46

## 2025-04-20 ASSESSMENT — PAIN SCALES - PAIN ASSESSMENT IN ADVANCED DEMENTIA (PAINAD)
FACIALEXPRESSION: SMILING OR INEXPRESSIVE
BODYLANGUAGE: RELAXED
FACIALEXPRESSION: SMILING OR INEXPRESSIVE
BODYLANGUAGE: RELAXED
TOTALSCORE: 0
TOTALSCORE: 0
CONSOLABILITY: NO NEED TO CONSOLE
CONSOLABILITY: NO NEED TO CONSOLE
BREATHING: NORMAL
BREATHING: NORMAL

## 2025-04-20 ASSESSMENT — PAIN SCALES - WONG BAKER: WONGBAKER_NUMERICALRESPONSE: NO HURT

## 2025-04-20 NOTE — PLAN OF CARE
Problem: Risk for Elopement  Goal: Patient will not exit the unit/facility without proper excort  Outcome: Progressing     Problem: Discharge Planning  Goal: Discharge to home or other facility with appropriate resources  Outcome: Progressing     Problem: Safety - Adult  Goal: Free from fall injury  Outcome: Progressing     Problem: Confusion  Goal: Confusion, delirium, dementia, or psychosis is improved or at baseline  Description: INTERVENTIONS:1. Assess for possible contributors to thought disturbance, including medications, impaired vision or hearing, underlying metabolic abnormalities, dehydration, psychiatric diagnoses, and notify attending LIP2. [place] high risk fall precautions, as indicated3. Provide frequent short contacts to provide reality reorientation, refocusing and direction4. Decrease environmental stimuli, including noise as appropriate5. Monitor and intervene to maintain adequate nutrition, hydration, elimination, sleep and activity6. If unable to ensure safety without constant attention obtain sitter and review sitter guidelines with assigned personnel7. Initiate Psychosocial CNS and Spiritual Care consult, as indicated  INTERVENTIONS:1. Assess for possible contributors to thought disturbance, including medications, impaired vision or hearing, underlying metabolic abnormalities, dehydration, psychiatric diagnoses, and notify attending LIP2. [place] high risk fall precautions, as indicated3. Provide frequent short contacts to provide reality reorientation, refocusing and direction4. Decrease environmental stimuli, including noise as appropriate5. Monitor and intervene to maintain adequate nutrition, hydration, elimination, sleep and activity6. If unable to ensure safety without constant attention obtain sitter and review sitter guidelines with assigned personnel7. Initiate Psychosocial CNS and Spiritual Care consult, as indicated  Outcome: Progressing     Problem: Pain  Goal:

## 2025-04-20 NOTE — PROGRESS NOTES
[facility]  [address] [place] [address] [phone]        Hospitalist Progress Note      NAME: Aureliano Roth   :  1950  MRM:  [id number]    Date/Time of service: 2025  10:40 AM       Subjective:   More awake today family at bedside.  Improved in p.o. intake, resting in no discomfort    ROS: per history above       Objective:       Vitals:       Last 24hrs VS reviewed since prior progress note. Most recent are:    Vitals:    25 0815   BP: (!) 178/98   Pulse: 70   Resp: 17   Temp: 98.4 °F (36.9 °C)   SpO2: 96%     SpO2 Readings from Last 6 Encounters:   25 96%   23 96%          Intake/Output Summary (Last 24 hours) at 2025 1040  Last data filed at 2025 1323  Gross per 24 hour   Intake 0 ml   Output --   Net 0 ml        Exam:     Physical Exam:    Gen:  Well-developed, well-nourished, patient is agitated.   HEENT:  Pink conjunctivae, moist mucous membranes  Neck:  Supple  Resp:  No accessory muscle use, clear breath sounds without wheezes rales or rhonchi  Card:  No murmurs, normal S1, S2 without thrills, bruits or peripheral edema  Abd:  Soft, non-tender, non-distended  Skin:  No rashes or ulcers, skin turgor is good  Neuro:  Cranial nerves 3-12 are grossly intact  Psych:  unclear insight and orientation.  Very agitated and does not follow commands.       Medications Reviewed: (see below)    Lab Data Reviewed: (see below)    ______________________________________________________________________    Medications:     Current Facility-Administered Medications   Medication Dose Route Frequency    amantadine (SYMMETREL) capsule 100 mg  100 mg Oral BID    lactobacillus (CULTURELLE) capsule 1 capsule  1 capsule Oral Daily with breakfast    miconazole (MICOTIN) 2 % powder   Topical BID    lactated ringers infusion   IntraVENous Continuous    midazolam PF (VERSED) injection 0.5 mg  0.5 mg IntraVENous Q8H PRN    prochlorperazine

## 2025-04-20 NOTE — PROGRESS NOTES
[facility]    Daly Bear, VINODA, CNRN, ACNP-BC  [facility]  [address]  [phone]        Name:   Aureliano Roth   Medical record #: [id number]  Admission Date: 4/16/2025   Reason for Consult:    Altered mental status     HISTORY OF PRESENT ILLNESS:       Aureliano Roth is a 74 y.o. male who presented to the ED on 4/16/2025 with a several day history of progressive confusion.  According to his wife at home he is awake and conversant oriented to his name, able to ambulate around the house independently, the does need assistance with bathing dressing and medication management.  Wife does endorse episodes of confusion in the evenings at home.  She tells me that she brought him in on the 16th because he had become weak and was falling at home.  Since admission he has had multiple episodes of agitation.  He was admitted to our hospitalist team and treatment was begun for a urinary tract infection however patient has now had 4 days of treatment without improvement in mentation.        Subjective/Objective:   Overnight events:      4/20/2025: Significantly more awake and alert today, wife and son at bedside tells me that he is now much closer to his baseline than when he came in to the hospital.                Plan of care discussed with:  Spouse at bedside  and Family at bedside    Impression/ Plan:      1.  Altered Mental Status:    Neurochecks:  Every 4 hours  Cerebell without ongoing seizure burden on 4/19/2025  formal EEG to rule out subclinical seizures on 4/21/2025  Restart home amantadine  Patient cannot have MRI due to pacemaker    Thank you for allowing the Neurology Service the pleasure of participating in the care of your patient.  Will return if EEG is abnormal otherwise patient to follow-up with home neurologist Dr. Luan Martin at [facility].     Physical Exam    Patient Vitals for the past 12 hrs:   Temp Pulse Resp BP

## 2025-04-20 NOTE — PROCEDURES
PROCEDURE NOTE  Date: 4/20/2025   Name: Aureliano Roth  YOB: 1950    Procedures      [facility]     Electroencephalogram Report    Procedure ID:  Procedure Date: 04/19/2025   Patient Name: Aureliano Roth YOB: 1950   Procedure Type:  Rapid EEG Medical Record No: [id number]     INDICATION: Altered mental status    Description of procedure: This EEG was obtained using a 10 lead, 8 channel system positioned circumferentially without any parasagittal coverage (rapid EEG). Computer selected EEG is reviewed as well as background features and all clinically significant events. Clarity algorithm utilized and implemented to provide analysis of underlying activity and seizure detection used to facilitate reading.    A total of 01:39:01 EEG recording was obtained.    Description of recording: During maximal wakefulness, the background activity of this  EEG consists of a posterior dominant rhythm of approximately 9 Hz that is well-  developed, symmetric, and reactive to eye-opening. This background activity slows with  the onset of drowsiness. No epileptiform discharges, focal slowing, or electrographic  seizures were noted throughout the recording.    Impression: Normal EEG during the awake and drowsy states.    Comment: A normal EEG does not rule out the diagnosis of a seizure disorder; clinical  correlation is advised.  If there is still persistent suspicion for continued seizure-like  activity, would advise obtaining an electroencephalogram with the 10-20 international  system for improved spatial resolution and parasagittal coverage.      Medications:  Current Facility-Administered Medications   Medication Dose Route Frequency    amantadine (SYMMETREL) capsule 100 mg  100 mg Oral BID    lactobacillus (CULTURELLE) capsule 1 capsule  1 capsule Oral Daily with breakfast    miconazole (MICOTIN) 2 % powder   Topical BID    lactated ringers infusion   IntraVENous  Continuous    midazolam PF (VERSED) injection 0.5 mg  0.5 mg IntraVENous Q8H PRN    prochlorperazine (COMPAZINE) injection 10 mg  10 mg IntraVENous Q6H PRN    fludrocortisone (FLORINEF) tablet 0.2 mg  0.2 mg Oral Daily    sodium chloride flush 0.9 % injection 5-40 mL  5-40 mL IntraVENous 2 times per day    sodium chloride flush 0.9 % injection 5-40 mL  5-40 mL IntraVENous PRN    0.9 % sodium chloride infusion   IntraVENous PRN    potassium chloride (KLOR-CON) extended release tablet 40 mEq  40 mEq Oral PRN    Or    potassium bicarb-citric acid (EFFER-K) effervescent tablet 40 mEq  40 mEq Oral PRN    Or    potassium chloride 10 mEq/100 mL IVPB (Peripheral Line)  10 mEq IntraVENous PRN    magnesium sulfate 2000 mg in 50 mL IVPB premix  2,000 mg IntraVENous PRN    enoxaparin (LOVENOX) injection 40 mg  40 mg SubCUTAneous Daily    polyethylene glycol (GLYCOLAX) packet 17 g  17 g Oral Daily PRN    acetaminophen (TYLENOL) tablet 650 mg  650 mg Oral Q6H PRN    Or    acetaminophen (TYLENOL) suppository 650 mg  650 mg Rectal Q6H PRN    aspirin EC tablet 325 mg  325 mg Oral Daily    donepezil (ARICEPT) tablet 10 mg  10 mg Oral QHS    FLUoxetine (PROZAC) capsule 10 mg  10 mg Oral Daily    levothyroxine (SYNTHROID) tablet 112 mcg  112 mcg Oral Daily    cefTRIAXone (ROCEPHIN) 1,000 mg in sterile water 10 mL IV syringe  1,000 mg IntraVENous Q24H    carbidopa-levodopa (SINEMET)  MG per tablet 2 tablet  2 tablet Oral TID    melatonin tablet 6 mg  6 mg Oral Nightly    pantoprazole (PROTONIX) tablet 40 mg  40 mg Oral QAM AC    [Held by provider] QUEtiapine (SEROQUEL) tablet 12.5 mg  12.5 mg Oral QAM    [Held by provider] QUEtiapine (SEROQUEL) tablet 50 mg  50 mg Oral Nightly

## 2025-04-21 LAB
ALBUMIN SERPL-MCNC: 2.5 G/DL (ref 3.5–5)
ALBUMIN/GLOB SERPL: 0.9 (ref 1.1–2.2)
ALP SERPL-CCNC: 44 U/L (ref 45–117)
ALT SERPL-CCNC: 6 U/L (ref 12–78)
ANION GAP SERPL CALC-SCNC: 2 MMOL/L (ref 2–12)
AST SERPL-CCNC: 18 U/L (ref 15–37)
BASOPHILS # BLD: 0.05 K/UL (ref 0–0.1)
BASOPHILS NFR BLD: 0.8 % (ref 0–1)
BILIRUB SERPL-MCNC: 0.8 MG/DL (ref 0.2–1)
BUN SERPL-MCNC: 15 MG/DL (ref 6–20)
BUN/CREAT SERPL: 15 (ref 12–20)
CALCIUM SERPL-MCNC: 8 MG/DL (ref 8.5–10.1)
CHLORIDE SERPL-SCNC: 111 MMOL/L (ref 97–108)
CO2 SERPL-SCNC: 29 MMOL/L (ref 21–32)
CREAT SERPL-MCNC: 0.97 MG/DL (ref 0.7–1.3)
DIFFERENTIAL METHOD BLD: ABNORMAL
EOSINOPHIL # BLD: 0.24 K/UL (ref 0–0.4)
EOSINOPHIL NFR BLD: 3.7 % (ref 0–7)
ERYTHROCYTE [DISTWIDTH] IN BLOOD BY AUTOMATED COUNT: 11.9 % (ref 11.5–14.5)
GLOBULIN SER CALC-MCNC: 2.8 G/DL (ref 2–4)
GLUCOSE SERPL-MCNC: 93 MG/DL (ref 65–100)
HCT VFR BLD AUTO: 45.1 % (ref 36.6–50.3)
HGB BLD-MCNC: 15 G/DL (ref 12.1–17)
IMM GRANULOCYTES # BLD AUTO: 0.06 K/UL (ref 0–0.04)
IMM GRANULOCYTES NFR BLD AUTO: 0.9 % (ref 0–0.5)
LYMPHOCYTES # BLD: 1.12 K/UL (ref 0.8–3.5)
LYMPHOCYTES NFR BLD: 17.3 % (ref 12–49)
MCH RBC QN AUTO: 29.3 PG (ref 26–34)
MCHC RBC AUTO-ENTMCNC: 33.3 G/DL (ref 30–36.5)
MCV RBC AUTO: 88.1 FL (ref 80–99)
MONOCYTES # BLD: 1.01 K/UL (ref 0–1)
MONOCYTES NFR BLD: 15.6 % (ref 5–13)
NEUTS SEG # BLD: 3.98 K/UL (ref 1.8–8)
NEUTS SEG NFR BLD: 61.7 % (ref 32–75)
NRBC # BLD: 0 K/UL (ref 0–0.01)
NRBC BLD-RTO: 0 PER 100 WBC
PLATELET # BLD AUTO: 179 K/UL (ref 150–400)
PMV BLD AUTO: 10.1 FL (ref 8.9–12.9)
POTASSIUM SERPL-SCNC: 3.3 MMOL/L (ref 3.5–5.1)
PROT SERPL-MCNC: 5.3 G/DL (ref 6.4–8.2)
RBC # BLD AUTO: 5.12 M/UL (ref 4.1–5.7)
SODIUM SERPL-SCNC: 142 MMOL/L (ref 136–145)
WBC # BLD AUTO: 6.5 K/UL (ref 4.1–11.1)

## 2025-04-21 PROCEDURE — 6370000000 HC RX 637 (ALT 250 FOR IP): Performed by: STUDENT IN AN ORGANIZED HEALTH CARE EDUCATION/TRAINING PROGRAM

## 2025-04-21 PROCEDURE — 97535 SELF CARE MNGMENT TRAINING: CPT

## 2025-04-21 PROCEDURE — 6360000002 HC RX W HCPCS: Performed by: FAMILY MEDICINE

## 2025-04-21 PROCEDURE — 6370000000 HC RX 637 (ALT 250 FOR IP): Performed by: NURSE PRACTITIONER

## 2025-04-21 PROCEDURE — 2500000003 HC RX 250 WO HCPCS: Performed by: STUDENT IN AN ORGANIZED HEALTH CARE EDUCATION/TRAINING PROGRAM

## 2025-04-21 PROCEDURE — 1100000000 HC RM PRIVATE

## 2025-04-21 PROCEDURE — 2580000003 HC RX 258: Performed by: FAMILY MEDICINE

## 2025-04-21 PROCEDURE — 6370000000 HC RX 637 (ALT 250 FOR IP): Performed by: FAMILY MEDICINE

## 2025-04-21 PROCEDURE — 6360000002 HC RX W HCPCS: Performed by: STUDENT IN AN ORGANIZED HEALTH CARE EDUCATION/TRAINING PROGRAM

## 2025-04-21 PROCEDURE — 97530 THERAPEUTIC ACTIVITIES: CPT

## 2025-04-21 PROCEDURE — 94761 N-INVAS EAR/PLS OXIMETRY MLT: CPT

## 2025-04-21 PROCEDURE — 80053 COMPREHEN METABOLIC PANEL: CPT

## 2025-04-21 PROCEDURE — 95957 EEG DIGITAL ANALYSIS: CPT

## 2025-04-21 PROCEDURE — 95816 EEG AWAKE AND DROWSY: CPT

## 2025-04-21 PROCEDURE — 95816 EEG AWAKE AND DROWSY: CPT | Performed by: PSYCHIATRY & NEUROLOGY

## 2025-04-21 PROCEDURE — 97116 GAIT TRAINING THERAPY: CPT

## 2025-04-21 PROCEDURE — 85025 COMPLETE CBC W/AUTO DIFF WBC: CPT

## 2025-04-21 PROCEDURE — 36415 COLL VENOUS BLD VENIPUNCTURE: CPT

## 2025-04-21 PROCEDURE — 2500000003 HC RX 250 WO HCPCS: Performed by: FAMILY MEDICINE

## 2025-04-21 RX ORDER — FLUDROCORTISONE ACETATE 0.1 MG/1
0.1 TABLET ORAL DAILY
Status: DISCONTINUED | OUTPATIENT
Start: 2025-04-21 | End: 2025-04-27 | Stop reason: HOSPADM

## 2025-04-21 RX ADMIN — PANTOPRAZOLE SODIUM 40 MG: 40 TABLET, DELAYED RELEASE ORAL at 06:15

## 2025-04-21 RX ADMIN — Medication 1 CAPSULE: at 08:17

## 2025-04-21 RX ADMIN — ENOXAPARIN SODIUM 40 MG: 100 INJECTION SUBCUTANEOUS at 08:17

## 2025-04-21 RX ADMIN — MICONAZOLE NITRATE: 2 POWDER TOPICAL at 21:50

## 2025-04-21 RX ADMIN — MICONAZOLE NITRATE: 2 POWDER TOPICAL at 08:19

## 2025-04-21 RX ADMIN — FLUOXETINE HYDROCHLORIDE 10 MG: 10 CAPSULE ORAL at 08:17

## 2025-04-21 RX ADMIN — POTASSIUM BICARBONATE 40 MEQ: 782 TABLET, EFFERVESCENT ORAL at 08:18

## 2025-04-21 RX ADMIN — Medication 6 MG: at 22:07

## 2025-04-21 RX ADMIN — SODIUM CHLORIDE, PRESERVATIVE FREE 10 ML: 5 INJECTION INTRAVENOUS at 08:18

## 2025-04-21 RX ADMIN — CARBIDOPA AND LEVODOPA 2 TABLET: 25; 100 TABLET ORAL at 15:15

## 2025-04-21 RX ADMIN — SODIUM CHLORIDE, SODIUM LACTATE, POTASSIUM CHLORIDE, AND CALCIUM CHLORIDE: .6; .31; .03; .02 INJECTION, SOLUTION INTRAVENOUS at 00:35

## 2025-04-21 RX ADMIN — LEVOTHYROXINE SODIUM 112 MCG: 0.11 TABLET ORAL at 06:15

## 2025-04-21 RX ADMIN — AMANTADINE HYDROCHLORIDE 100 MG: 100 CAPSULE ORAL at 08:17

## 2025-04-21 RX ADMIN — AMANTADINE HYDROCHLORIDE 100 MG: 100 CAPSULE ORAL at 22:07

## 2025-04-21 RX ADMIN — CARBIDOPA AND LEVODOPA 2 TABLET: 25; 100 TABLET ORAL at 08:17

## 2025-04-21 RX ADMIN — SODIUM CHLORIDE, PRESERVATIVE FREE 10 ML: 5 INJECTION INTRAVENOUS at 21:50

## 2025-04-21 RX ADMIN — CARBIDOPA AND LEVODOPA 2 TABLET: 25; 100 TABLET ORAL at 22:07

## 2025-04-21 RX ADMIN — WATER 1000 MG: 1 INJECTION INTRAMUSCULAR; INTRAVENOUS; SUBCUTANEOUS at 22:07

## 2025-04-21 RX ADMIN — DONEPEZIL HYDROCHLORIDE 10 MG: 5 TABLET, FILM COATED ORAL at 22:07

## 2025-04-21 RX ADMIN — FLUDROCORTISONE ACETATE 0.1 MG: 0.1 TABLET ORAL at 08:17

## 2025-04-21 ASSESSMENT — PAIN SCALES - PAIN ASSESSMENT IN ADVANCED DEMENTIA (PAINAD)
FACIALEXPRESSION: SMILING OR INEXPRESSIVE
CONSOLABILITY: NO NEED TO CONSOLE
BREATHING: NORMAL
BODYLANGUAGE: RELAXED
BREATHING: NORMAL
CONSOLABILITY: NO NEED TO CONSOLE
TOTALSCORE: 0
BREATHING: NORMAL
BREATHING: NORMAL
FACIALEXPRESSION: SMILING OR INEXPRESSIVE
CONSOLABILITY: NO NEED TO CONSOLE
TOTALSCORE: 0
FACIALEXPRESSION: SMILING OR INEXPRESSIVE
BODYLANGUAGE: TENSE, DISTRESSED PACING, FIDGETING
TOTALSCORE: 2
CONSOLABILITY: NO NEED TO CONSOLE
BODYLANGUAGE: RELAXED
TOTALSCORE: 0
BODYLANGUAGE: RELAXED
FACIALEXPRESSION: SAD, FRIGHTENED, FROWN

## 2025-04-21 NOTE — PLAN OF CARE
Problem: Physical Therapy - Adult  Goal: By Discharge: Performs mobility at highest level of function for planned discharge setting.  See evaluation for individualized goals.  Description: FUNCTIONAL STATUS PRIOR TO ADMISSION: Prior level of function unknown on evaluation. Patient with h/o PD and dementia, admitted for altered mental status.     HOME SUPPORT PRIOR TO ADMISSION: The patient lived with spouse who was not present on evaluation.    Physical Therapy Goals  Initiated 4/17/2025  1.  Patient will move from supine to sit and sit to supine, scoot up and down, and roll side to side in bed with contact guard assist within 7 day(s).    2.  Patient will perform sit to stand with contact guard assist within 7 day(s).  3.  Patient will transfer from bed to chair and chair to bed with contact guard assist using the least restrictive device within 7 day(s).  4.  Patient will ambulate with contact guard assist for 50 feet with the least restrictive device within 7 day(s).   5.  Patient will progress to stair training if applicable within 7 day(s).   Outcome: Progressing   PHYSICAL THERAPY TREATMENT    Patient: Aureliano Roth (74 y.o. male)  Date: 4/21/2025  Diagnosis: Acute cystitis without hematuria [N30.00]  Altered mental status, unspecified altered mental status type [R41.82]  AMS (altered mental status) [R41.82] AMS (altered mental status)      Precautions:              ASSESSMENT:  Patient continues to benefit from skilled PT services and is slowly progressing towards goals. Wife present throughout session. Pt oriented to self cooperative during session. Limited by  Parkinson's dyskinesia,  impaired cognition, balance, coordination, up to Max A x 2 with bed mobility and functional transfers. Demonstrates poor stepping with L>R with SPT to chair. Per wife, pt did not use RW prior to admission, demonstrates some difficulty trial use for balance with short bout of gait training. BLE fatigue quickly with OOB

## 2025-04-21 NOTE — PLAN OF CARE
Problem: Occupational Therapy - Adult  Goal: By Discharge: Performs self-care activities at highest level of function for planned discharge setting.  See evaluation for individualized goals.  Description: FUNCTIONAL STATUS PRIOR TO ADMISSION:  patient unable to provide hx and family not present, per chart review hx of dementia and Parkinson's, confused at baseline but admitted with increased confusion from baseline   ,  ,  ,  ,  ,  ,  ,  ,  ,  ,       HOME SUPPORT: Patient lived with his wife per  chart.    Occupational Therapy Goals:  Initiated 4/17/2025  1.  Patient will perform self-feeding with Moderate Assist within 7 day(s).  2.  Patient will follow 1 step commands > or = 50% within 7 day(s).  3.  Patient will maintain attention to functional task > or = 2 minutes within 7 day(s).  4.  Patient will sit edge of bed > or = 10 minutes with SBA for functional task within 7 day(s).  5.  Patient will stand pivot transfer to bedside commode or chair with Minimal Assist and Assist x2 within 7 day(s).    Outcome: Progressing     OCCUPATIONAL THERAPY TREATMENT  Patient: Aureliano Roth (74 y.o. male)  Date: 4/21/2025  Primary Diagnosis: Acute cystitis without hematuria [N30.00]  Altered mental status, unspecified altered mental status type [R41.82]  AMS (altered mental status) [R41.82]       Precautions: fall  Chart, occupational therapy assessment, plan of care, and goals were reviewed.    ASSESSMENT  Patient continues to benefit from skilled OT services and is slowly progressing towards goals.  He presents today A&Ox1 with supportive wife at bedside to provide encouragement.  He remains limited by impaired attention, memory, initiation, sequencing, insight, safety awareness, coordination, overall strength, balance, and functional reach for ADLs.  He progressed to transfer OOB to chair for the first time since admission, requiring maxAx2.  He also progressed to contribute to self-feeding and required only Ajit for

## 2025-04-21 NOTE — PROGRESS NOTES
This Routine EEG was performed in real-time by an EEG technologist. Electrodes placed according to the 10-20 International System. Standard sensitivity 7uV/mm and high filter 70 Hz and low filter 1 Hz settings were set at initiation of the recording and adjustments, as appropriate, and noted on the recording. Baseline electrode impedances were at or below 10k Ohms. Per protocol, this recording data is uploaded/transferred from the EEG equipment to a central storage location in real time. Duration of this EEG was 20:44 minutes. Photic stimulation was not performed, and Hyperventilation was not performed. Digital analysis is recorded using a software tool that analyzes EEG data to identify seizures called Persyst.

## 2025-04-21 NOTE — PROGRESS NOTES
[facility]  [address] [place] [address] [phone]        Hospitalist Progress Note      NAME: Aureliano Roth   :  1950  MRM:  [id number]    Date/Time of service: 2025  1:11 PM       Subjective:   More awake today family at bedside.  Improved in p.o. intake yesterday per family report.  Only occasionally answers questions.     ROS: per history above       Objective:       Vitals:       Last 24hrs VS reviewed since prior progress note. Most recent are:    Vitals:    25 1152   BP: 103/78   Pulse: 90   Resp: 18   Temp: 97.8 °F (36.6 °C)   SpO2: 93%     SpO2 Readings from Last 6 Encounters:   25 93%   23 96%          Intake/Output Summary (Last 24 hours) at 2025 1311  Last data filed at 2025 1751  Gross per 24 hour   Intake 814.06 ml   Output 625 ml   Net 189.06 ml        Exam:     Physical Exam:    Gen:  Well-developed, well-nourished, patient is agitated.   HEENT:  Pink conjunctivae, moist mucous membranes  Neck:  Supple  Resp:  No accessory muscle use, clear breath sounds without wheezes rales or rhonchi  Card:  No murmurs, normal S1, S2 without thrills, bruits or peripheral edema  Abd:  Soft, non-tender, non-distended  Skin:  No rashes or ulcers, skin turgor is good  Neuro:  Cranial nerves 3-12 are grossly intact.  Occasional pill rolling tremor.   Psych:  unclear insight and orientation.  Agitation is improved from prior exams.       Medications Reviewed: (see below)    Lab Data Reviewed: (see below)    ______________________________________________________________________    Medications:     Current Facility-Administered Medications   Medication Dose Route Frequency    fludrocortisone (FLORINEF) tablet 0.1 mg  0.1 mg Oral Daily    amantadine (SYMMETREL) capsule 100 mg  100 mg Oral BID    lactobacillus (CULTURELLE) capsule 1 capsule  1 capsule Oral Daily with breakfast    miconazole (MICOTIN) 2 % powder   Topical BID

## 2025-04-21 NOTE — CARE COORDINATION
4/21/25  3:11 PM    Care Management Progress Note    Reason for Admission:   Acute cystitis without hematuria [N30.00]  Altered mental status, unspecified altered mental status type [R41.82]  AMS (altered mental status) [R41.82]         Patient Admission Status: Inpatient  RUR: 13%  Hospitalization in the last 30 days (Readmission):  No        Transition of care plan:  Ongoing medical management  Discharge plan:  PT recommending IPR- referral placed with [facility] per spouse request  Discharge plan communicated with patient and/or discharge caregiver: Yes    Date 1st IMM letter given:   Outpatient follow-up.  Transport at discharge: TBD       04/21/25 1510   Services At/After Discharge   Transition of Care Consult (CM Consult) Acute Rehab   Condition of Participation: Discharge Planning   The Patient and/or Patient Representative was provided with a Choice of Provider? Patient Representative   Name of the Patient Representative who was provided with the Choice of Provider and agrees with the Discharge Plan?  Peg Roth- spouse   The Patient and/Or Patient Representative agree with the Discharge Plan? Yes   Freedom of Choice list was provided with basic dialogue that supports the patient's individualized plan of care/goals, treatment preferences, and shares the quality data associated with the providers?  Yes     GAGANDEEP Spring  [facility]      Available via [employer]

## 2025-04-21 NOTE — PROCEDURES
PROCEDURE NOTE  Date: 4/21/2025   Name: Aureliano Roth  YOB: 1950    Procedures      [facility]     Electroencephalogram Report    Procedure ID: SFA  Procedure Date: 04/21/2025   Patient Name: Aureliano Roth YOB: 1950   Procedure Type: Routine Medical Record No: [id number]     INDICATION: Altered mental status    STATE: Awake and drowsy .    DESCRIPTION OF PROCEDURE: Electrodes were applied in accordance with the international 10-20 system of electrode placement.  EEG was reviewed in both bipolar and referential montages.    Description of Activity:  During wakefulness, patient is only able to mount 6-7 hertz posterior frequency that spreads anteriorly.     During drowsiness, there is attenuation of the underlying frequency and slower low voltage theta activity occurs bilaterally.     No sharp or spike discharges, seizures or epileptiform discharges seen. No focal asymmetry.    Clinical Interpretation:  This EEG, performed during wakefulness and drowsiness is abnormal. There is mild generalized slowing as seen in encephalopathies. There is no focal asymmetry, seizures or epileptiform discharges seen.       Medications:  Current Facility-Administered Medications   Medication Dose Route Frequency    fludrocortisone (FLORINEF) tablet 0.1 mg  0.1 mg Oral Daily    amantadine (SYMMETREL) capsule 100 mg  100 mg Oral BID    lactobacillus (CULTURELLE) capsule 1 capsule  1 capsule Oral Daily with breakfast    miconazole (MICOTIN) 2 % powder   Topical BID    midazolam PF (VERSED) injection 0.5 mg  0.5 mg IntraVENous Q8H PRN    prochlorperazine (COMPAZINE) injection 10 mg  10 mg IntraVENous Q6H PRN    sodium chloride flush 0.9 % injection 5-40 mL  5-40 mL IntraVENous 2 times per day    sodium chloride flush 0.9 % injection 5-40 mL  5-40 mL IntraVENous PRN    0.9 % sodium chloride infusion   IntraVENous PRN    potassium chloride (KLOR-CON) extended release

## 2025-04-22 LAB
ALBUMIN SERPL-MCNC: 2.7 G/DL (ref 3.5–5)
ALBUMIN/GLOB SERPL: 1.2 (ref 1.1–2.2)
ALP SERPL-CCNC: 47 U/L (ref 45–117)
ALT SERPL-CCNC: 13 U/L (ref 12–78)
ANION GAP SERPL CALC-SCNC: 6 MMOL/L (ref 2–12)
AST SERPL-CCNC: 25 U/L (ref 15–37)
BASOPHILS # BLD: 0.07 K/UL (ref 0–0.1)
BASOPHILS NFR BLD: 0.9 % (ref 0–1)
BILIRUB SERPL-MCNC: 0.6 MG/DL (ref 0.2–1)
BUN SERPL-MCNC: 12 MG/DL (ref 6–20)
BUN/CREAT SERPL: 13 (ref 12–20)
CALCIUM SERPL-MCNC: 8.1 MG/DL (ref 8.5–10.1)
CHLORIDE SERPL-SCNC: 110 MMOL/L (ref 97–108)
CO2 SERPL-SCNC: 27 MMOL/L (ref 21–32)
CREAT SERPL-MCNC: 0.95 MG/DL (ref 0.7–1.3)
DIFFERENTIAL METHOD BLD: ABNORMAL
EOSINOPHIL # BLD: 0.3 K/UL (ref 0–0.4)
EOSINOPHIL NFR BLD: 3.7 % (ref 0–7)
ERYTHROCYTE [DISTWIDTH] IN BLOOD BY AUTOMATED COUNT: 11.9 % (ref 11.5–14.5)
GLOBULIN SER CALC-MCNC: 2.3 G/DL (ref 2–4)
GLUCOSE SERPL-MCNC: 96 MG/DL (ref 65–100)
HCT VFR BLD AUTO: 45.2 % (ref 36.6–50.3)
HGB BLD-MCNC: 15.2 G/DL (ref 12.1–17)
IMM GRANULOCYTES # BLD AUTO: 0.13 K/UL (ref 0–0.04)
IMM GRANULOCYTES NFR BLD AUTO: 1.6 % (ref 0–0.5)
LYMPHOCYTES # BLD: 1.25 K/UL (ref 0.8–3.5)
LYMPHOCYTES NFR BLD: 15.3 % (ref 12–49)
MCH RBC QN AUTO: 29.3 PG (ref 26–34)
MCHC RBC AUTO-ENTMCNC: 33.6 G/DL (ref 30–36.5)
MCV RBC AUTO: 87.3 FL (ref 80–99)
MONOCYTES # BLD: 0.94 K/UL (ref 0–1)
MONOCYTES NFR BLD: 11.5 % (ref 5–13)
NEUTS SEG # BLD: 5.46 K/UL (ref 1.8–8)
NEUTS SEG NFR BLD: 67 % (ref 32–75)
NRBC # BLD: 0 K/UL (ref 0–0.01)
NRBC BLD-RTO: 0 PER 100 WBC
PLATELET # BLD AUTO: 209 K/UL (ref 150–400)
PMV BLD AUTO: 10.2 FL (ref 8.9–12.9)
POTASSIUM SERPL-SCNC: 3.5 MMOL/L (ref 3.5–5.1)
PROT SERPL-MCNC: 5 G/DL (ref 6.4–8.2)
RBC # BLD AUTO: 5.18 M/UL (ref 4.1–5.7)
SODIUM SERPL-SCNC: 143 MMOL/L (ref 136–145)
WBC # BLD AUTO: 8.2 K/UL (ref 4.1–11.1)

## 2025-04-22 PROCEDURE — 6370000000 HC RX 637 (ALT 250 FOR IP): Performed by: FAMILY MEDICINE

## 2025-04-22 PROCEDURE — 97530 THERAPEUTIC ACTIVITIES: CPT

## 2025-04-22 PROCEDURE — 94761 N-INVAS EAR/PLS OXIMETRY MLT: CPT

## 2025-04-22 PROCEDURE — 80053 COMPREHEN METABOLIC PANEL: CPT

## 2025-04-22 PROCEDURE — 2500000003 HC RX 250 WO HCPCS: Performed by: FAMILY MEDICINE

## 2025-04-22 PROCEDURE — 1100000000 HC RM PRIVATE

## 2025-04-22 PROCEDURE — 6360000002 HC RX W HCPCS: Performed by: FAMILY MEDICINE

## 2025-04-22 PROCEDURE — 97116 GAIT TRAINING THERAPY: CPT

## 2025-04-22 PROCEDURE — 6360000002 HC RX W HCPCS: Performed by: STUDENT IN AN ORGANIZED HEALTH CARE EDUCATION/TRAINING PROGRAM

## 2025-04-22 PROCEDURE — 6370000000 HC RX 637 (ALT 250 FOR IP): Performed by: STUDENT IN AN ORGANIZED HEALTH CARE EDUCATION/TRAINING PROGRAM

## 2025-04-22 PROCEDURE — 6370000000 HC RX 637 (ALT 250 FOR IP): Performed by: NURSE PRACTITIONER

## 2025-04-22 PROCEDURE — 85025 COMPLETE CBC W/AUTO DIFF WBC: CPT

## 2025-04-22 PROCEDURE — 2500000003 HC RX 250 WO HCPCS: Performed by: STUDENT IN AN ORGANIZED HEALTH CARE EDUCATION/TRAINING PROGRAM

## 2025-04-22 RX ADMIN — Medication 1 CAPSULE: at 09:17

## 2025-04-22 RX ADMIN — WATER 1000 MG: 1 INJECTION INTRAMUSCULAR; INTRAVENOUS; SUBCUTANEOUS at 21:59

## 2025-04-22 RX ADMIN — SODIUM CHLORIDE, PRESERVATIVE FREE 10 ML: 5 INJECTION INTRAVENOUS at 21:59

## 2025-04-22 RX ADMIN — MICONAZOLE NITRATE: 2 POWDER TOPICAL at 09:18

## 2025-04-22 RX ADMIN — CARBIDOPA AND LEVODOPA 2 TABLET: 25; 100 TABLET ORAL at 21:59

## 2025-04-22 RX ADMIN — AMANTADINE HYDROCHLORIDE 100 MG: 100 CAPSULE ORAL at 09:18

## 2025-04-22 RX ADMIN — PANTOPRAZOLE SODIUM 40 MG: 40 TABLET, DELAYED RELEASE ORAL at 06:13

## 2025-04-22 RX ADMIN — FLUDROCORTISONE ACETATE 0.1 MG: 0.1 TABLET ORAL at 09:17

## 2025-04-22 RX ADMIN — FLUOXETINE HYDROCHLORIDE 10 MG: 10 CAPSULE ORAL at 09:17

## 2025-04-22 RX ADMIN — CARBIDOPA AND LEVODOPA 2 TABLET: 25; 100 TABLET ORAL at 09:17

## 2025-04-22 RX ADMIN — CARBIDOPA AND LEVODOPA 2 TABLET: 25; 100 TABLET ORAL at 14:29

## 2025-04-22 RX ADMIN — Medication 6 MG: at 21:59

## 2025-04-22 RX ADMIN — LEVOTHYROXINE SODIUM 112 MCG: 0.11 TABLET ORAL at 06:13

## 2025-04-22 RX ADMIN — ENOXAPARIN SODIUM 40 MG: 100 INJECTION SUBCUTANEOUS at 09:18

## 2025-04-22 RX ADMIN — SODIUM CHLORIDE, PRESERVATIVE FREE 10 ML: 5 INJECTION INTRAVENOUS at 09:18

## 2025-04-22 ASSESSMENT — PAIN SCALES - PAIN ASSESSMENT IN ADVANCED DEMENTIA (PAINAD)
BODYLANGUAGE: TENSE, DISTRESSED PACING, FIDGETING
FACIALEXPRESSION: SAD, FRIGHTENED, FROWN
TOTALSCORE: 1
CONSOLABILITY: NO NEED TO CONSOLE
BODYLANGUAGE: RELAXED
TOTALSCORE: 1
CONSOLABILITY: NO NEED TO CONSOLE
CONSOLABILITY: NO NEED TO CONSOLE
TOTALSCORE: 0
BREATHING: NORMAL
BREATHING: NORMAL
FACIALEXPRESSION: SMILING OR INEXPRESSIVE
BODYLANGUAGE: RELAXED
BREATHING: NORMAL
FACIALEXPRESSION: SMILING OR INEXPRESSIVE

## 2025-04-22 ASSESSMENT — PAIN SCALES - WONG BAKER: WONGBAKER_NUMERICALRESPONSE: NO HURT

## 2025-04-22 NOTE — CARE COORDINATION
5:21 PM  CM asked nursing to try to trial patient without a sitter and they started trialing patient without a sitter as this would help patient with bed availability. CM updated patients spouse, she let CM know that she only prefers [facility].     1:55 PM  Patient has been accepted by [facility] pending sitter bed availability. CM asked if patients spouse could stay with patient to alleviate the need for a sitter room. CM waiting for a response.      10:53 AM  CM met with patients spouse and she is agreeable to additional referrals to [facility] and [facility]. Referrals placed. Patients spouse would like to know if she can stay with her spouse at the [facility].     4/22/25  8:43 AM    Care Management Progress Note    Reason for Admission:   Acute cystitis without hematuria [N30.00]  Altered mental status, unspecified altered mental status type [R41.82]  AMS (altered mental status) [R41.82]         Patient Admission Status: Inpatient  RUR: 13%  Hospitalization in the last 30 days (Readmission):  No        Transition of care plan:  Ongoing medical management  Neurology consulted  Discharge plan:  Referral placed with [facility]- they are reviewing  Discharge plan communicated with patient and/or discharge caregiver: Yes    Date 1st IMM letter given: 4/18/25  Outpatient follow-up.  Transport at discharge: Family Elicia Estevez MSW  [facility] [place][note 2]      Available via [employer]

## 2025-04-22 NOTE — PROGRESS NOTES
[facility]  [address] [place] [address] [phone]        Hospitalist Progress Note      NAME: Aureliano Roth   :  1950  MRM:  [id number]    Date/Time of service: 2025  2:38 PM       Subjective:   Worked well with PT today.  He denies any complaints today.     ROS: per history above       Objective:       Vitals:       Last 24hrs VS reviewed since prior progress note. Most recent are:    Vitals:    25 1142   BP: (!) 146/96   Pulse: 70   Resp: 17   Temp: 98.1 °F (36.7 °C)   SpO2: 94%     SpO2 Readings from Last 6 Encounters:   25 94%   23 96%          Intake/Output Summary (Last 24 hours) at 2025 1438  Last data filed at 2025 0913  Gross per 24 hour   Intake 116 ml   Output 1000 ml   Net -884 ml        Exam:     Physical Exam:    Gen:  Well-developed, well-nourished, patient is agitated.   HEENT:  Pink conjunctivae, moist mucous membranes  Neck:  Supple  Resp:  No accessory muscle use, clear breath sounds without wheezes rales or rhonchi  Card:  No murmurs, normal S1, S2 without thrills, bruits or peripheral edema  Abd:  Soft, non-tender, non-distended  Skin:  No rashes or ulcers, skin turgor is good  Neuro:  Cranial nerves 3-12 are grossly intact.  Did respond to questions today, but some speech unintelligible.   Psych:  unclear insight and orientation.      Medications Reviewed: (see below)    Lab Data Reviewed: (see below)    ______________________________________________________________________    Medications:     Current Facility-Administered Medications   Medication Dose Route Frequency    fludrocortisone (FLORINEF) tablet 0.1 mg  0.1 mg Oral Daily    amantadine (SYMMETREL) capsule 100 mg  100 mg Oral BID    lactobacillus (CULTURELLE) capsule 1 capsule  1 capsule Oral Daily with breakfast    miconazole (MICOTIN) 2 % powder   Topical BID    midazolam PF (VERSED) injection 0.5 mg  0.5 mg IntraVENous Q8H PRN

## 2025-04-22 NOTE — PLAN OF CARE
Problem: Physical Therapy - Adult  Goal: By Discharge: Performs mobility at highest level of function for planned discharge setting.  See evaluation for individualized goals.  Description: FUNCTIONAL STATUS PRIOR TO ADMISSION: Prior level of function unknown on evaluation. Patient with h/o PD and dementia, admitted for altered mental status.     HOME SUPPORT PRIOR TO ADMISSION: The patient lived with spouse who was not present on evaluation.    Physical Therapy Goals  Initiated 4/17/2025  1.  Patient will move from supine to sit and sit to supine, scoot up and down, and roll side to side in bed with contact guard assist within 7 day(s).    2.  Patient will perform sit to stand with contact guard assist within 7 day(s).  3.  Patient will transfer from bed to chair and chair to bed with contact guard assist using the least restrictive device within 7 day(s).  4.  Patient will ambulate with contact guard assist for 50 feet with the least restrictive device within 7 day(s).   5.  Patient will progress to stair training if applicable within 7 day(s).   Outcome: Progressing   PHYSICAL THERAPY TREATMENT    Patient: Aureliaon Roth (74 y.o. male)  Date: 4/22/2025  Diagnosis: Acute cystitis without hematuria [N30.00]  Altered mental status, unspecified altered mental status type [R41.82]  AMS (altered mental status) [R41.82] AMS (altered mental status)      Precautions:              ASSESSMENT:  Patient continues to benefit from skilled PT services and is slowly progressing towards goals. Pt confused but alert and cooperative during session, increased time for command following. Tolerated increased activity this session,  performed reciprocal seated activity prior to mobility  requiring Min x 2 with repetitive sit<>stand and Mod A x 1 with close chair follow for two bouts of gait training with HHA- improved gait with metronome aurora.  Pt is significantly below his functional baseline and would tolerate intensive

## 2025-04-22 NOTE — PLAN OF CARE
Problem: Occupational Therapy - Adult  Goal: By Discharge: Performs self-care activities at highest level of function for planned discharge setting.  See evaluation for individualized goals.  Description: FUNCTIONAL STATUS PRIOR TO ADMISSION:  patient unable to provide hx and family not present, per chart review hx of dementia and Parkinson's, confused at baseline but admitted with increased confusion from baseline   ,  ,  ,  ,  ,  ,  ,  ,  ,  ,       HOME SUPPORT: Patient lived with his wife per  chart.    Occupational Therapy Goals:  Initiated 4/17/2025  1.  Patient will perform self-feeding with Moderate Assist within 7 day(s).  2.  Patient will follow 1 step commands > or = 50% within 7 day(s).  3.  Patient will maintain attention to functional task > or = 2 minutes within 7 day(s).  4.  Patient will sit edge of bed > or = 10 minutes with SBA for functional task within 7 day(s).  5.  Patient will stand pivot transfer to bedside commode or chair with Minimal Assist and Assist x2 within 7 day(s).    Outcome: Progressing   OCCUPATIONAL THERAPY TREATMENT  Patient: Aureliano Roth (74 y.o. male)  Date: 4/22/2025  Primary Diagnosis: Acute cystitis without hematuria [N30.00]  Altered mental status, unspecified altered mental status type [R41.82]  AMS (altered mental status) [R41.82]       Precautions:                  Chart, occupational therapy assessment, plan of care, and goals were reviewed.    ASSESSMENT  Patient continues to benefit from skilled OT services and is slowly progressing towards goals. Pt impaired with decreased balance, cognition, strength. He stood several times from bed, leans to right and needs cues to obtain midline. He took steps to transfer to chair. He needed multiple cues and repetition to bring cloth to face. Pt left to eat lunch, spouse present.             PLAN :  Patient continues to benefit from skilled intervention to address the above impairments.  Continue treatment per

## 2025-04-22 NOTE — PLAN OF CARE
Problem: Risk for Elopement  Goal: Patient will not exit the unit/facility without proper excort  Outcome: Progressing     Problem: Discharge Planning  Goal: Discharge to home or other facility with appropriate resources  Outcome: Progressing     Problem: Safety - Adult  Goal: Free from fall injury  Outcome: Progressing

## 2025-04-22 NOTE — PROGRESS NOTES
Spiritual Health History and Assessment/Progress Note  [facility]    Initial Encounter,  ,  ,      Name: Aureliano Roth MRN: [id number]    Age: 74 y.o.     Sex: male   Language: English   Mandaeism: Confucianism   AMS (altered mental status)     Date: 4/22/2025            Total Time Calculated: 30 min              Spiritual Assessment began in [facility] A3 MULTI-SPECIALTY TELEMETRY        Referral/Consult From: Palliative Care   Encounter Overview/Reason: Initial Encounter  Service Provided For: Patient    Sharona, Belief, Meaning:   Patient is connected with a sharona tradition or spiritual practice  Family/Friends No family/friends present      Importance and Influence:  Patient unable to assess at this time  Family/Friends No family/friends present    Community:  Patient feels well-supported. Support system includes: Spouse/Partner  Family/Friends No family/friends present    Assessment and Plan of Care:    visit for initial assessment. Reviewed chart and spoke with nurse. No family present at this time. Provided a supportive presence and followed pt's lead in conversation. Chart notes indicate pt is Confucianism and was followed by his . Pt shared he didn't like to go to Religion. Pt shared he enjoyed fishing. Pt shared concerns about strangers, and yet invited me to stay. Pt offered some conversation that I did not understand. Pt appeared grateful for the company.    Patient Interventions include: Facilitated expression of thoughts and feelings and Affirmed coping skills/support systems  Family/Friends Interventions include: No family/friends present    Patient Plan of Care: Spiritual Care available upon further referral  Family/Friends Plan of Care: Spiritual Care available upon further referral    Electronically signed by PAUL Madrigal on 4/22/2025 at 3:09 PM

## 2025-04-23 LAB
BACTERIA SPEC CULT: NORMAL
BACTERIA SPEC CULT: NORMAL
SERVICE CMNT-IMP: NORMAL
SERVICE CMNT-IMP: NORMAL

## 2025-04-23 PROCEDURE — 2500000003 HC RX 250 WO HCPCS: Performed by: STUDENT IN AN ORGANIZED HEALTH CARE EDUCATION/TRAINING PROGRAM

## 2025-04-23 PROCEDURE — 6370000000 HC RX 637 (ALT 250 FOR IP): Performed by: FAMILY MEDICINE

## 2025-04-23 PROCEDURE — 6370000000 HC RX 637 (ALT 250 FOR IP): Performed by: STUDENT IN AN ORGANIZED HEALTH CARE EDUCATION/TRAINING PROGRAM

## 2025-04-23 PROCEDURE — 97530 THERAPEUTIC ACTIVITIES: CPT

## 2025-04-23 PROCEDURE — 97535 SELF CARE MNGMENT TRAINING: CPT

## 2025-04-23 PROCEDURE — 1100000000 HC RM PRIVATE

## 2025-04-23 PROCEDURE — 6360000002 HC RX W HCPCS: Performed by: STUDENT IN AN ORGANIZED HEALTH CARE EDUCATION/TRAINING PROGRAM

## 2025-04-23 PROCEDURE — 94761 N-INVAS EAR/PLS OXIMETRY MLT: CPT

## 2025-04-23 RX ORDER — ASPIRIN 325 MG
325 TABLET ORAL DAILY
Status: DISCONTINUED | OUTPATIENT
Start: 2025-04-23 | End: 2025-04-27 | Stop reason: HOSPADM

## 2025-04-23 RX ADMIN — SODIUM CHLORIDE, PRESERVATIVE FREE 10 ML: 5 INJECTION INTRAVENOUS at 09:15

## 2025-04-23 RX ADMIN — CARBIDOPA AND LEVODOPA 2 TABLET: 25; 100 TABLET ORAL at 09:14

## 2025-04-23 RX ADMIN — FLUDROCORTISONE ACETATE 0.1 MG: 0.1 TABLET ORAL at 09:14

## 2025-04-23 RX ADMIN — ENOXAPARIN SODIUM 40 MG: 100 INJECTION SUBCUTANEOUS at 09:15

## 2025-04-23 RX ADMIN — CARBIDOPA AND LEVODOPA 2 TABLET: 25; 100 TABLET ORAL at 14:37

## 2025-04-23 RX ADMIN — DONEPEZIL HYDROCHLORIDE 10 MG: 5 TABLET, FILM COATED ORAL at 21:01

## 2025-04-23 RX ADMIN — PANTOPRAZOLE SODIUM 40 MG: 40 TABLET, DELAYED RELEASE ORAL at 06:45

## 2025-04-23 RX ADMIN — LEVOTHYROXINE SODIUM 112 MCG: 0.11 TABLET ORAL at 06:45

## 2025-04-23 RX ADMIN — FLUOXETINE HYDROCHLORIDE 10 MG: 10 CAPSULE ORAL at 09:14

## 2025-04-23 RX ADMIN — MICONAZOLE NITRATE: 2 POWDER TOPICAL at 09:14

## 2025-04-23 RX ADMIN — Medication 1 CAPSULE: at 09:14

## 2025-04-23 RX ADMIN — Medication 6 MG: at 21:01

## 2025-04-23 RX ADMIN — SODIUM CHLORIDE, PRESERVATIVE FREE 10 ML: 5 INJECTION INTRAVENOUS at 21:01

## 2025-04-23 RX ADMIN — ASPIRIN 325 MG: 325 TABLET ORAL at 11:49

## 2025-04-23 RX ADMIN — CARBIDOPA AND LEVODOPA 2 TABLET: 25; 100 TABLET ORAL at 21:01

## 2025-04-23 ASSESSMENT — PAIN SCALES - PAIN ASSESSMENT IN ADVANCED DEMENTIA (PAINAD)
FACIALEXPRESSION: SMILING OR INEXPRESSIVE
BREATHING: NORMAL
TOTALSCORE: 0
BREATHING: NORMAL
TOTALSCORE: 0
CONSOLABILITY: NO NEED TO CONSOLE
CONSOLABILITY: NO NEED TO CONSOLE
BODYLANGUAGE: RELAXED
BODYLANGUAGE: RELAXED
FACIALEXPRESSION: SMILING OR INEXPRESSIVE

## 2025-04-23 ASSESSMENT — PAIN SCALES - WONG BAKER: WONGBAKER_NUMERICALRESPONSE: NO HURT

## 2025-04-23 NOTE — CARE COORDINATION
Care Management Progress Note    Reason for Admission:   Acute cystitis without hematuria [N30.00]  Altered mental status, unspecified altered mental status type [R41.82]  AMS (altered mental status) [R41.82]         Patient Admission Status: Inpatient  RUR: 13%  Hospitalization in the last 30 days (Readmission):  No        Transition of care plan:  Patient is medically stable for DC per Attending.  Virtual Sitter discontinued yesterday, 4/22 afternoon.     Discharge Plan:    [facility]--Accepted.  Pending bed availability.  CM confirmed with [facility]ia[facility] that a bed is not available today.     [facility]--Accepted.  However, patient's spouse confirmed that she wants to wait for [facility], since it is closer to their home.     Discharge plan communicated with patient and/or discharge caregiver: Yes.  CM met with patient & patient's spouse at bedside to discuss DCP updates.       Date last IMM letter given: 4/22/2025    Outpatient follow-up:  TBD    Transport at discharge: Debra MCLEOD.      Will continue to follow.      ______________________________________  KAMRAN Sanabria, RN-CM  [facility]- Care Management  Available via [employer]  4/23/2025  2:01 PM       Dr Blank Sherwood @ bedside to see patient.

## 2025-04-23 NOTE — PLAN OF CARE
Problem: Occupational Therapy - Adult  Goal: By Discharge: Performs self-care activities at highest level of function for planned discharge setting.  See evaluation for individualized goals.  Description: FUNCTIONAL STATUS PRIOR TO ADMISSION:  patient unable to provide hx and family not present, per chart review hx of dementia and Parkinson's, confused at baseline but admitted with increased confusion from baseline   ,  ,  ,  ,  ,  ,  ,  ,  ,  ,       HOME SUPPORT: Patient lived with his wife per  chart.    Occupational Therapy Goals:  Initiated 4/17/2025  1.  Patient will perform self-feeding with Moderate Assist within 7 day(s).  2.  Patient will follow 1 step commands > or = 50% within 7 day(s).  3.  Patient will maintain attention to functional task > or = 2 minutes within 7 day(s).  4.  Patient will sit edge of bed > or = 10 minutes with SBA for functional task within 7 day(s).  5.  Patient will stand pivot transfer to bedside commode or chair with Minimal Assist and Assist x2 within 7 day(s).    Outcome: Not Progressing     OCCUPATIONAL THERAPY TREATMENT  Patient: Aureliano Roth (74 y.o. male)  Date: 4/23/2025  Primary Diagnosis: Acute cystitis without hematuria [N30.00]  Altered mental status, unspecified altered mental status type [R41.82]  AMS (altered mental status) [R41.82]       Precautions:  fall  Chart, occupational therapy assessment, plan of care, and goals were reviewed.    ASSESSMENT  Patient continues to benefit from skilled OT services and is not progressing towards goals. Patient presents this afternoon with increased gross rigidity, limited command following, limited verbal output, and poor initiation despite max physical prompting/ facilitation throughout session.  He required max assist throughout session to release grasps on objects and therapists' hands and was incontinent of bowel although indicated no awareness.  He required max to totalAx2 for bed mobility and totalAx2 for

## 2025-04-23 NOTE — PLAN OF CARE
Problem: Physical Therapy - Adult  Goal: By Discharge: Performs mobility at highest level of function for planned discharge setting.  See evaluation for individualized goals.  Description: FUNCTIONAL STATUS PRIOR TO ADMISSION: Prior level of function unknown on evaluation. Patient with h/o PD and dementia, admitted for altered mental status.     HOME SUPPORT PRIOR TO ADMISSION: The patient lived with spouse who was not present on evaluation.    Physical Therapy Goals  Initiated 4/17/2025  1.  Patient will move from supine to sit and sit to supine, scoot up and down, and roll side to side in bed with contact guard assist within 7 day(s).    2.  Patient will perform sit to stand with contact guard assist within 7 day(s).  3.  Patient will transfer from bed to chair and chair to bed with contact guard assist using the least restrictive device within 7 day(s).  4.  Patient will ambulate with contact guard assist for 50 feet with the least restrictive device within 7 day(s).   5.  Patient will progress to stair training if applicable within 7 day(s).   Outcome: Progressing   PHYSICAL THERAPY TREATMENT    Patient: Aureliano Roth (74 y.o. male)  Date: 4/23/2025  Diagnosis: Acute cystitis without hematuria [N30.00]  Altered mental status, unspecified altered mental status type [R41.82]  AMS (altered mental status) [R41.82] AMS (altered mental status)      Precautions:              ASSESSMENT:  Patient continues to benefit from skilled PT services and is not progressing towards goals. Patient today requiring increased assist for all functional mobility.  MAX A for rolling, MAX-Total A x2 for all upright mobility due to increased rigidity and decreased command following.  Patient was able to transfer to chair but sustained a bowel movement and required return to bed for hygiene.          PLAN:  Patient continues to benefit from skilled intervention to address the above impairments.  Continue treatment per established

## 2025-04-23 NOTE — PROGRESS NOTES
1724 nurse made Dr. Pruitt aware that patient's wife feels he is more lethargic and jerking more.     1826 Dr. Pruitt seen patient at bedside and reports to keep monitoring patient.

## 2025-04-23 NOTE — PROGRESS NOTES
[facility] [place] [facility]  [address] [place] [address] [phone]        Hospitalist Progress Note      NAME: Aureliano Roth   :  1950  MRM:  [id number]    Date/Time of service: 2025  2:38 PM       Subjective:   He denies any complaints today. He is more responsive today.     ROS: per history above       Objective:       Vitals:       Last 24hrs VS reviewed since prior progress note. Most recent are:    Vitals:    25 0819   BP: 130/84   Pulse: 73   Resp: 18   Temp: 97.9 °F (36.6 °C)   SpO2: 95%     SpO2 Readings from Last 6 Encounters:   25 95%   23 96%          Intake/Output Summary (Last 24 hours) at 2025 1438  Last data filed at 2025 2159  Gross per 24 hour   Intake 216 ml   Output --   Net 216 ml        Exam:     Physical Exam:    Gen:  Well-developed, well-nourished, patient is agitated.   HEENT:  Pink conjunctivae, moist mucous membranes  Neck:  Supple  Resp:  No accessory muscle use, clear breath sounds without wheezes rales or rhonchi  Card:  No murmurs, normal S1, S2 without thrills, bruits or peripheral edema  Abd:  Soft, non-tender, non-distended  Skin:  No rashes or ulcers, skin turgor is good  Neuro:  Cranial nerves 3-12 are grossly intact.  Did respond to questions today, but some speech unintelligible.   Psych:  unclear insight and orientation.      Medications Reviewed: (see below)    Lab Data Reviewed: (see below)    ______________________________________________________________________    Medications:     Current Facility-Administered Medications   Medication Dose Route Frequency    aspirin tablet 325 mg  325 mg Oral Daily    fludrocortisone (FLORINEF) tablet 0.1 mg  0.1 mg Oral Daily    lactobacillus (CULTURELLE) capsule 1 capsule  1 capsule Oral Daily with breakfast    miconazole (MICOTIN) 2 % powder   Topical BID    midazolam PF (VERSED) injection 0.5 mg  0.5 mg IntraVENous Q8H PRN    prochlorperazine (COMPAZINE)

## 2025-04-24 PROBLEM — R53.81 DEBILITY: Status: ACTIVE | Noted: 2025-04-24

## 2025-04-24 PROBLEM — Z51.5 PALLIATIVE CARE ENCOUNTER: Status: ACTIVE | Noted: 2025-04-24

## 2025-04-24 PROCEDURE — 94761 N-INVAS EAR/PLS OXIMETRY MLT: CPT

## 2025-04-24 PROCEDURE — 2500000003 HC RX 250 WO HCPCS: Performed by: STUDENT IN AN ORGANIZED HEALTH CARE EDUCATION/TRAINING PROGRAM

## 2025-04-24 PROCEDURE — 97530 THERAPEUTIC ACTIVITIES: CPT

## 2025-04-24 PROCEDURE — 6370000000 HC RX 637 (ALT 250 FOR IP): Performed by: STUDENT IN AN ORGANIZED HEALTH CARE EDUCATION/TRAINING PROGRAM

## 2025-04-24 PROCEDURE — 97530 THERAPEUTIC ACTIVITIES: CPT | Performed by: OCCUPATIONAL THERAPIST

## 2025-04-24 PROCEDURE — 6370000000 HC RX 637 (ALT 250 FOR IP): Performed by: FAMILY MEDICINE

## 2025-04-24 PROCEDURE — 97535 SELF CARE MNGMENT TRAINING: CPT | Performed by: OCCUPATIONAL THERAPIST

## 2025-04-24 PROCEDURE — 99233 SBSQ HOSP IP/OBS HIGH 50: CPT | Performed by: NURSE PRACTITIONER

## 2025-04-24 PROCEDURE — 6360000002 HC RX W HCPCS: Performed by: STUDENT IN AN ORGANIZED HEALTH CARE EDUCATION/TRAINING PROGRAM

## 2025-04-24 PROCEDURE — 1100000000 HC RM PRIVATE

## 2025-04-24 RX ADMIN — CARBIDOPA AND LEVODOPA 2 TABLET: 25; 100 TABLET ORAL at 21:58

## 2025-04-24 RX ADMIN — Medication 6 MG: at 21:58

## 2025-04-24 RX ADMIN — ASPIRIN 325 MG: 325 TABLET ORAL at 09:38

## 2025-04-24 RX ADMIN — CARBIDOPA AND LEVODOPA 2 TABLET: 25; 100 TABLET ORAL at 14:47

## 2025-04-24 RX ADMIN — MICONAZOLE NITRATE: 2 POWDER TOPICAL at 21:00

## 2025-04-24 RX ADMIN — FLUOXETINE HYDROCHLORIDE 10 MG: 10 CAPSULE ORAL at 09:19

## 2025-04-24 RX ADMIN — MICONAZOLE NITRATE: 2 POWDER TOPICAL at 09:20

## 2025-04-24 RX ADMIN — CARBIDOPA AND LEVODOPA 2 TABLET: 25; 100 TABLET ORAL at 09:19

## 2025-04-24 RX ADMIN — ENOXAPARIN SODIUM 40 MG: 100 INJECTION SUBCUTANEOUS at 09:19

## 2025-04-24 RX ADMIN — FLUDROCORTISONE ACETATE 0.1 MG: 0.1 TABLET ORAL at 09:19

## 2025-04-24 RX ADMIN — SODIUM CHLORIDE, PRESERVATIVE FREE 10 ML: 5 INJECTION INTRAVENOUS at 09:19

## 2025-04-24 RX ADMIN — LEVOTHYROXINE SODIUM 112 MCG: 0.11 TABLET ORAL at 06:29

## 2025-04-24 RX ADMIN — Medication 1 CAPSULE: at 09:19

## 2025-04-24 RX ADMIN — DONEPEZIL HYDROCHLORIDE 10 MG: 5 TABLET, FILM COATED ORAL at 21:58

## 2025-04-24 RX ADMIN — PANTOPRAZOLE SODIUM 40 MG: 40 TABLET, DELAYED RELEASE ORAL at 06:34

## 2025-04-24 ASSESSMENT — PAIN SCALES - WONG BAKER: WONGBAKER_NUMERICALRESPONSE: NO HURT

## 2025-04-24 NOTE — PROGRESS NOTES
[facility]– [place]  [address] [place] [address] [phone]        Hospitalist Progress Note      NAME: Aureliano Roth   :  1950  MRM:  [id number]    Date/Time of service: 2025  1:19 PM       Subjective:   Is able to respond occasional to questions. Denies any complaints. Is usually more responsive during morning and is more agitated and confused at night.   ROS: per history above       Objective:       Vitals:       Last 24hrs VS reviewed since prior progress note. Most recent are:    Vitals:    25 0915   BP: (!) 133/98   Pulse: 73   Resp:    Temp:    SpO2:      SpO2 Readings from Last 6 Encounters:   25 95%   23 96%          Intake/Output Summary (Last 24 hours) at 2025 1319  Last data filed at 2025 2101  Gross per 24 hour   Intake 100 ml   Output --   Net 100 ml        Exam:     Physical Exam:    Gen:  Well-developed, well-nourished, patient is agitated.   HEENT:  Pink conjunctivae, moist mucous membranes  Neck:  Supple  Resp:  No accessory muscle use, clear breath sounds without wheezes rales or rhonchi  Card:  No murmurs, normal S1, S2 without thrills, bruits or peripheral edema  Abd:  Soft, non-tender, non-distended  Skin:  No rashes or ulcers, skin turgor is good  Neuro:  Cranial nerves 3-12 are grossly intact.  Did respond to questions today, but some speech unintelligible.   Psych:  unclear insight and orientation.      Medications Reviewed: (see below)    Lab Data Reviewed: (see below)    ______________________________________________________________________    Medications:     Current Facility-Administered Medications   Medication Dose Route Frequency    aspirin tablet 325 mg  325 mg Oral Daily    fludrocortisone (FLORINEF) tablet 0.1 mg  0.1 mg Oral Daily    lactobacillus (CULTURELLE) capsule 1 capsule  1 capsule Oral Daily with breakfast    miconazole (MICOTIN) 2 % powder   Topical BID    midazolam PF (VERSED) injection  hospital problems. *      74 years old male patient with PMH of BPH, Mobitz type 1 second degree AV block, bradycardia s/p cardiac pacemaker in situ, Typical atrial flutter, Hx of aortic valve insufficiency, Parkinson disease, Dementia due to Parkinson's disease, Hypothyroid, Ankylosing spondylitis, Depression who presented to the emergency room accompanied by his wife today with main complaint of worsening generalized weakness and altered mental status.     Acute encephalopathy: This is likely progression of dementia with component of delirium severity has waxed and waned.   --Neurology consulted and EEG neg  --Cannot obtain MRI due to pacemaker.   --TSH and ammonia neg  --CT head neg    UTI: acute, present on admission. Met sepsis criteria intially. Urine culture growing klebsiella  Completed 7 day course of rocephin while admitted.   Blood cultures neg  CXR neg  Lactic acid neg    # Recent fall  associated with generalized weakness secondary to above  PT/OT to eval  CT brain and C-spine are negative for any acute process     # Hx Parkinson/Dementia  Continue Sinemet 2 tablets 3 times daily  Did trial amantadine earlier in admission and it may possibly have helped tremors. Primary neurologist recommended against it. Recommend considering this outpatient.   holding seroquel at this time as it prolongs Qtc.   continue prozac at this time  The beneficiary has a medical condition which requires positioning of the body in ways not feasible with an ordinary bed. The beneficiary requires the head of the bed to elevated more than 30 degrees most of the time due to concern for aspiration       #  Mobitz type 1 second degree AV block, bradycardia s/p cardiac pacemaker in situ, Typical atrial flutter, Hx of aortic valve insufficiency, prolonged QTc  Continue home aspirin, high dose  Not anticoagulated currently as risk of anticoagulation thought to be too high.   Currently stable fortunately  Will hold Qtc prolonging meds if

## 2025-04-24 NOTE — PLAN OF CARE
Problem: Physical Therapy - Adult  Goal: By Discharge: Performs mobility at highest level of function for planned discharge setting.  See evaluation for individualized goals.  Description: FUNCTIONAL STATUS PRIOR TO ADMISSION: Prior level of function unknown on evaluation. Patient with h/o PD and dementia, admitted for altered mental status.     HOME SUPPORT PRIOR TO ADMISSION: The patient lived with spouse who was not present on evaluation.    Physical Therapy Goals  Updated 4/24/2025 due to no goals met  1.  Patient will move from supine to sit and sit to supine, scoot up and down, and roll side to side in bed with moderate assist within 7 day(s).    2.  Patient will perform sit to stand with moderate assist within 7 day(s).  3.  Patient will transfer from bed to chair and chair to bed with moderate assist using the least restrictive device within 7 day(s).    Initiated 4/17/2025  1.  Patient will move from supine to sit and sit to supine, scoot up and down, and roll side to side in bed with contact guard assist within 7 day(s).    2.  Patient will perform sit to stand with contact guard assist within 7 day(s).  3.  Patient will transfer from bed to chair and chair to bed with contact guard assist using the least restrictive device within 7 day(s).  4.  Patient will ambulate with contact guard assist for 50 feet with the least restrictive device within 7 day(s).   5.  Patient will progress to stair training if applicable within 7 day(s).   Outcome: Not Progressing   PHYSICAL THERAPY TREATMENT: WEEKLY REASSESSMENT    Patient: Aureliano Roth (74 y.o. male)  Date: 4/24/2025  Primary Diagnosis: Acute cystitis without hematuria [N30.00]  Altered mental status, unspecified altered mental status type [R41.82]  AMS (altered mental status) [R41.82]       Precautions:            ASSESSMENT :  Patient continues to benefit from skilled PT services and is not progressing towards goals. Patient received while working with  Hospitalization in [facility] Patient [facility]: A Retrospective, Observational Study. Arch Rehabil Res Clin Transl. 2022 Jul 16;4(3):727112. doi: 10.1016/j.arrct.2022.022064. PMID: 48762452; PMCID: PCG6019094.  4. Galdino GRANADOS, Hawa S, Sweetie W, Ena NASCIMENTO. AM-PAC Short Forms Manual 4.0. Revised 2/2020.                                                                                                                                                                                                                               Pain Rating:  Patient not answering when asked if anything hurts    Activity Tolerance:   Poor    After treatment:   Patient left in no apparent distress in bed, Bed/ chair alarm activated, Caregiver / family present, and Side rails x3    COMMUNICATION/EDUCATION:   The patient's plan of care was discussed with: occupational therapist and          Thank you for this referral.  Gabbi Colby, PT  Minutes: 10

## 2025-04-24 NOTE — PROGRESS NOTES
Palliative Medicine  Patient Name: Aureliano Roth  YOB: 1950  MRN: [id number]  Age: 74 y.o.  Gender: male    Date of Initial Consult: 4/17/2025  Date of Service: 4/17/2025  Time: 5:00 PM  Provider: BABAK Steele NP  Hospital Day: 9  Admit Date: 4/16/2025  Referring Provider: Dr. Aranda       Reasons for Consultation:  Goals of Care    HISTORY OF PRESENT ILLNESS (HPI):   Aureliano Roth is a 74 y.o. male with a past medical history of BPH, Parkinson's, dementia, intermittent paranoia/delusions/visual hallucination, hypothyroidism, AV insufficiency, s/p AV replacement, depression, HTN, bradycardia s/p pacemaker who presented to the ER with CC of worsening weakness and AMS and admitted on 4/16/2025 with a diagnosis of acute on chronic CHF, bilateral pleural effusions,.     Interval Hx-   4/16-4/19- confused, limited participation and intermittent agitation, requiring virtual sitter. Patient incontinent, unaware.   4/21 -Decreased agitation, however EEG results abnormal, showing generalized slowing during both drowsy and awake states.  4/22- OT /PT eval- significantly below baseline w/ decreased balance, slow progress 2/2 cognition and decreased strength,  requires mod 2 person assist w/ bed mobility,  sit --> stand and max assist Stand/pivot transfers. but note making slow progress.    4/23- Responds to some questions however some speech unintelligible, PT/OT  Mr. Roth  NOT making progress, now requiring Max/Total Ax 2 for functional mobility d/t increased rigidity and decreased command following. In the evening, nurse noted patient lethargic, w/ increased frequency of \"jerking\" movements in upper /lower extremities.      4/24- Loose stools this am, reportedly more awake early am, ate eggs, but requires assist w. Feeding. Mod morning,  PT/OT attempted to work w/ him, noted he was grabbing gown or therapists arm, not responding to verbal/tactile or manual cues, remained non verbal and

## 2025-04-24 NOTE — PLAN OF CARE
Problem: Occupational Therapy - Adult  Goal: By Discharge: Performs self-care activities at highest level of function for planned discharge setting.  See evaluation for individualized goals.  Description: FUNCTIONAL STATUS PRIOR TO ADMISSION:  patient unable to provide hx and family not present, per chart review hx of dementia and Parkinson's, confused at baseline but admitted with increased confusion from baseline   ,  ,  ,  ,  ,  ,  ,  ,  ,  ,       HOME SUPPORT: Patient lived with his wife per  chart.    Occupational Therapy Goals:  Initiated 4/17/2025, weekly re-assessment 4/24/25, continue all goals  1.  Patient will perform self-feeding with Moderate Assist within 7 day(s).  2.  Patient will follow 1 step commands > or = 50% within 7 day(s).  3.  Patient will maintain attention to functional task > or = 2 minutes within 7 day(s).  4.  Patient will sit edge of bed > or = 10 minutes with SBA for functional task within 7 day(s).  5.  Patient will stand pivot transfer to bedside commode or chair with Minimal Assist and Assist x2 within 7 day(s).    Outcome: Not Progressing    OCCUPATIONAL THERAPY TREATMENT: WEEKLY REASSESSMENT    Patient: Aureliano Roth (74 y.o. male)  Date: 4/24/2025  Primary Diagnosis: Acute cystitis without hematuria [N30.00]  Altered mental status, unspecified altered mental status type [R41.82]  AMS (altered mental status) [R41.82]       Precautions:                  Chart, occupational therapy assessment, plan of care, and goals were reviewed.    ASSESSMENT  Patient continues to benefit from skilled OT services and is not progressing towards goals. Patient continues to be limited by decreased arousal, attention to task, minimal to no command following, decreased orientation, resting and intention tremors and reflexive  bilaterally, right > left limiting ability to use hands for mobility or functional tasks. Facilitated sitting edge of bed with max assist x's 1 and initially CGA for

## 2025-04-24 NOTE — PROGRESS NOTES
Comprehensive Nutrition Assessment    Type and Reason for Visit:  Initial, LOS    Nutrition Recommendations/Plan:   Continue regular diet order  Modify ONS: Provide Ensure Plus High Protein TID (1050 kcal, 132 g carbs, 60 g protein) to aid in meeting kcal/protein needs.     Malnutrition Assessment:  Malnutrition Status:  Insufficient data (04/24/25 1349)      Nutrition Assessment:     Patient is a 74 year old male admitted with Acute cystitis without hematuria [N30.00]  Altered mental status, unspecified altered mental status type [R41.82]  AMS (altered mental status) [R41.82]. He  has a past medical history of Anxiety, Aortic insufficiency, Arthritis, Atrial flutter (HCC), BPH (benign prostatic hyperplasia), Hypothyroid, Migraines, and Other second degree atrioventricular block.  RD screen for LOS. No family bedside during RD encounter this AM, patient unable to provide much meaningful information. Appears he is consuming 50% of most meals and ONS. NKFA. No known chewing/swallowing issues. Lack of recent wt hx in chart review. NFPE not completed at this time. He is quiet during RD encounter and unsure of UBW. No noted nausea/vomiting during admission. +loose stool this AM per documentation. Last labs 2 days ago with lytes WDL. Awaiting placement.     With current intake trend, likely getting ~66% kcal needs and ~88% protein needs. Will modify ONS to higher caloric option to ensure meeting greater percentage of needs.    Meal Intake:   Patient Vitals for the past 168 hrs:   PO Meals Eaten (%)   04/23/25 2101 26 - 50%   04/22/25 2159 26 - 50%   04/22/25 1816 1 - 25%   04/22/25 0846 26 - 50%   04/19/25 1323 0%   04/19/25 0911 26 - 50%   04/18/25 1054 1 - 25%     Supplement Intake:  Patient Vitals for the past 168 hrs:   PO Supplement (%)   04/22/25 1816 26 - 50%   04/22/25 0846 26 - 50%   04/19/25 0911 26 - 50%   04/18/25 1054 76 - 100%       Wt Readings from Last 10 Encounters:   04/16/25 83.9 kg (184 lb 15.5 oz)  178 lbs (81 kg)       Current Body Weight: 83.9 kg (184 lb 15.5 oz), 103.9 % IBW. Weight Source: Bed scale  Current BMI (kg/m2): 25.1           Weight Adjustment For: No Adjustment                 BMI Categories: Overweight (BMI 25.0-29.9)    Estimated Daily Nutrient Needs:  Energy Requirements Based On: Formula  Weight Used for Energy Requirements: Current  Energy (kcal/day): 2090 (MSJ x 1.3)  Weight Used for Protein Requirements: Current  Protein (g/day):  (0.8-1 g/kg)  Method Used for Fluid Requirements: 1 ml/kcal  Fluid (ml/day): 2090    Nutrition Diagnosis:   Predicted inadequate energy intake related to cognitive or neurological impairment as evidenced by intake 26-50%    Nutrition Interventions:   Food and/or Nutrient Delivery: Continue Current Diet, Modify Oral Nutrition Supplement  Nutrition Education/Counseling: No recommendation at this time  Coordination of Nutrition Care: Continue to monitor while inpatient       Goals:  Goals: Meet at least 75% of estimated needs, by next RD assessment          Nutrition Monitoring and Evaluation:   Behavioral-Environmental Outcomes: None Identified  Food/Nutrient Intake Outcomes: Food and Nutrient Intake, Supplement Intake  Physical Signs/Symptoms Outcomes: Biochemical Data    Discharge Planning:    Continue current diet, Continue Oral Nutrition Supplement     Maritza Pappas MS, RD, CNSC  Ext: [phone], or via [employer]

## 2025-04-24 NOTE — PLAN OF CARE
Problem: Risk for Elopement  Goal: Patient will not exit the unit/facility without proper excort  4/24/2025 1043 by Dulce Zavala RN  Outcome: Progressing  4/24/2025 0550 by Richard Souza RN  Outcome: Progressing     Problem: Safety - Adult  Goal: Free from fall injury  4/24/2025 1043 by Dulce Zavala RN  Outcome: Progressing  4/24/2025 0550 by Richard Souza RN  Outcome: Progressing     Problem: Confusion  Goal: Confusion, delirium, dementia, or psychosis is improved or at baseline  Description: INTERVENTIONS:1. Assess for possible contributors to thought disturbance, including medications, impaired vision or hearing, underlying metabolic abnormalities, dehydration, psychiatric diagnoses, and notify attending LIP2. [place] high risk fall precautions, as indicated3. Provide frequent short contacts to provide reality reorientation, refocusing and direction4. Decrease environmental stimuli, including noise as appropriate5. Monitor and intervene to maintain adequate nutrition, hydration, elimination, sleep and activity6. If unable to ensure safety without constant attention obtain sitter and review sitter guidelines with assigned personnel7. Initiate Psychosocial CNS and Spiritual Care consult, as indicated  INTERVENTIONS:1. Assess for possible contributors to thought disturbance, including medications, impaired vision or hearing, underlying metabolic abnormalities, dehydration, psychiatric diagnoses, and notify attending LIP2. [place] high risk fall precautions, as indicated3. Provide frequent short contacts to provide reality reorientation, refocusing and direction4. Decrease environmental stimuli, including noise as appropriate5. Monitor and intervene to maintain adequate nutrition, hydration, elimination, sleep and activity6. If unable to ensure safety without constant attention obtain sitter and review sitter guidelines with assigned personnel7. Initiate Psychosocial CNS and Spiritual Care consult, as  indicated  4/24/2025 1043 by Dulce Zavala RN  Outcome: Progressing  4/24/2025 0550 by Richard Souza RN  Outcome: Progressing     Problem: Pain  Goal: Verbalizes/displays adequate comfort level or baseline comfort level  4/24/2025 1043 by Dulce Zavala RN  Outcome: Progressing  4/24/2025 0550 by Richard Souza RN  Outcome: Progressing  Flowsheets (Taken 4/23/2025 2101)  Verbalizes/displays adequate comfort level or baseline comfort level: Assess pain using appropriate pain scale     Problem: Skin/Tissue Integrity  Goal: Skin integrity remains intact  Description: 1.  Monitor for areas of redness and/or skin breakdown2.  Assess vascular access sites hourly3.  Every 4-6 hours minimum:  Change oxygen saturation probe site4.  Every 4-6 hours:  If on nasal continuous positive airway pressure, respiratory therapy assess nares and determine need for appliance change or resting period  4/24/2025 1043 by Dulce Zavala RN  Outcome: Progressing  4/24/2025 0550 by Richard Souza RN  Outcome: Progressing  Flowsheets (Taken 4/23/2025 2101)  Skin Integrity Remains Intact: Monitor for areas of redness and/or skin breakdown

## 2025-04-24 NOTE — PLAN OF CARE
Problem: Risk for Elopement  Goal: Patient will not exit the unit/facility without proper excort  Outcome: Progressing     Problem: Discharge Planning  Goal: Discharge to home or other facility with appropriate resources  Outcome: Progressing     Problem: Safety - Adult  Goal: Free from fall injury  Outcome: Progressing     Problem: Confusion  Goal: Confusion, delirium, dementia, or psychosis is improved or at baseline  Description: INTERVENTIONS:1. Assess for possible contributors to thought disturbance, including medications, impaired vision or hearing, underlying metabolic abnormalities, dehydration, psychiatric diagnoses, and notify attending LIP2. [place] high risk fall precautions, as indicated3. Provide frequent short contacts to provide reality reorientation, refocusing and direction4. Decrease environmental stimuli, including noise as appropriate5. Monitor and intervene to maintain adequate nutrition, hydration, elimination, sleep and activity6. If unable to ensure safety without constant attention obtain sitter and review sitter guidelines with assigned personnel7. Initiate Psychosocial CNS and Spiritual Care consult, as indicated  INTERVENTIONS:1. Assess for possible contributors to thought disturbance, including medications, impaired vision or hearing, underlying metabolic abnormalities, dehydration, psychiatric diagnoses, and notify attending LIP2. [place] high risk fall precautions, as indicated3. Provide frequent short contacts to provide reality reorientation, refocusing and direction4. Decrease environmental stimuli, including noise as appropriate5. Monitor and intervene to maintain adequate nutrition, hydration, elimination, sleep and activity6. If unable to ensure safety without constant attention obtain sitter and review sitter guidelines with assigned personnel7. Initiate Psychosocial CNS and Spiritual Care consult, as indicated  Outcome: Progressing     Problem: Pain  Goal:

## 2025-04-24 NOTE — CARE COORDINATION
Case Management Progress Note      Discharge Plan:  Anticipate patient discharging to home with personal care services, skilled HHC services, and DMEs:  Hospital bed and a bedside commode.        CM met with patient, patient's spouse, patient's brother, & sister-in-law 3 times today, to discuss discharge planning updates.        (1)  Personal Care Services:  A list has been provided to patient's spouse.  She's been communicating with [facility] care and [facility], in terms of costs and services.  This is out-of-pocket-pay.        (2)  HHC SN/PT/OT:  Referral sent pending acceptance.      (3)  DMEs:  Hospital bed and a bedside commode:  Referral sent to [facility]--pending acceptance and insurance approval.        (4)  [facility] and [facility] have declined patient, due to patient not being about follow commands and needing max assist.  Patient will not tolerate 3 hours of therapy a day.        (5)  Patient's spouse declined SNF Placement, as patient will require 24hr supervision and assistance, which a SNF cannot provide.        (6)  A list of senior living/Memory Care Facilities has been provided to patient for review.  CM encourage patient's spouse to visit the facilities as soon as possible, to learn about their services and costs.  This is out-of-pocket pay.        (7)  Transportation:  Stretcher.  4-steps entering their 1-level home.          Will continue to follow.  Medical treatment team informed of updates.    ______________________________________  KAMRAN Sanabria, RN-CM  [facility]- Care Management  Available via [employer]  4/24/2025  3:02 PM

## 2025-04-25 PROCEDURE — 99231 SBSQ HOSP IP/OBS SF/LOW 25: CPT | Performed by: NURSE PRACTITIONER

## 2025-04-25 PROCEDURE — 97116 GAIT TRAINING THERAPY: CPT

## 2025-04-25 PROCEDURE — 2500000003 HC RX 250 WO HCPCS: Performed by: STUDENT IN AN ORGANIZED HEALTH CARE EDUCATION/TRAINING PROGRAM

## 2025-04-25 PROCEDURE — 97110 THERAPEUTIC EXERCISES: CPT

## 2025-04-25 PROCEDURE — 6370000000 HC RX 637 (ALT 250 FOR IP): Performed by: FAMILY MEDICINE

## 2025-04-25 PROCEDURE — 6360000002 HC RX W HCPCS: Performed by: STUDENT IN AN ORGANIZED HEALTH CARE EDUCATION/TRAINING PROGRAM

## 2025-04-25 PROCEDURE — 1100000000 HC RM PRIVATE

## 2025-04-25 PROCEDURE — 97535 SELF CARE MNGMENT TRAINING: CPT

## 2025-04-25 PROCEDURE — 6370000000 HC RX 637 (ALT 250 FOR IP): Performed by: STUDENT IN AN ORGANIZED HEALTH CARE EDUCATION/TRAINING PROGRAM

## 2025-04-25 PROCEDURE — 94761 N-INVAS EAR/PLS OXIMETRY MLT: CPT

## 2025-04-25 RX ORDER — LORAZEPAM 2 MG/ML
1 CONCENTRATE ORAL
Qty: 168 ML | Refills: 0 | Status: SHIPPED | OUTPATIENT
Start: 2025-04-25 | End: 2025-05-09

## 2025-04-25 RX ORDER — HYOSCYAMINE SULFATE 0.12 MG/1
0.12 TABLET SUBLINGUAL EVERY 4 HOURS PRN
Qty: 20 TABLET | Refills: 0 | Status: SHIPPED | OUTPATIENT
Start: 2025-04-25

## 2025-04-25 RX ORDER — MORPHINE SULFATE 100 MG/5ML
5 SOLUTION ORAL
Qty: 5 ML | Refills: 0 | Status: SHIPPED | OUTPATIENT
Start: 2025-04-25 | End: 2025-05-05

## 2025-04-25 RX ORDER — PROMETHAZINE HYDROCHLORIDE 25 MG/1
25 TABLET ORAL 4 TIMES DAILY PRN
Qty: 20 TABLET | Refills: 0 | Status: SHIPPED | OUTPATIENT
Start: 2025-04-25 | End: 2025-05-02

## 2025-04-25 RX ADMIN — SODIUM CHLORIDE, PRESERVATIVE FREE 10 ML: 5 INJECTION INTRAVENOUS at 08:31

## 2025-04-25 RX ADMIN — CARBIDOPA AND LEVODOPA 2 TABLET: 25; 100 TABLET ORAL at 13:58

## 2025-04-25 RX ADMIN — Medication 6 MG: at 20:23

## 2025-04-25 RX ADMIN — LEVOTHYROXINE SODIUM 112 MCG: 0.11 TABLET ORAL at 06:31

## 2025-04-25 RX ADMIN — Medication 1 CAPSULE: at 08:30

## 2025-04-25 RX ADMIN — CARBIDOPA AND LEVODOPA 2 TABLET: 25; 100 TABLET ORAL at 20:23

## 2025-04-25 RX ADMIN — ACETAMINOPHEN 650 MG: 325 TABLET ORAL at 08:32

## 2025-04-25 RX ADMIN — MICONAZOLE NITRATE: 2 POWDER TOPICAL at 08:31

## 2025-04-25 RX ADMIN — FLUDROCORTISONE ACETATE 0.1 MG: 0.1 TABLET ORAL at 08:30

## 2025-04-25 RX ADMIN — DONEPEZIL HYDROCHLORIDE 10 MG: 5 TABLET, FILM COATED ORAL at 20:23

## 2025-04-25 RX ADMIN — ASPIRIN 325 MG: 325 TABLET ORAL at 08:30

## 2025-04-25 RX ADMIN — PANTOPRAZOLE SODIUM 40 MG: 40 TABLET, DELAYED RELEASE ORAL at 06:31

## 2025-04-25 RX ADMIN — MICONAZOLE NITRATE: 2 POWDER TOPICAL at 20:35

## 2025-04-25 RX ADMIN — SODIUM CHLORIDE, PRESERVATIVE FREE 10 ML: 5 INJECTION INTRAVENOUS at 20:23

## 2025-04-25 RX ADMIN — ENOXAPARIN SODIUM 40 MG: 100 INJECTION SUBCUTANEOUS at 08:30

## 2025-04-25 RX ADMIN — FLUOXETINE HYDROCHLORIDE 10 MG: 10 CAPSULE ORAL at 08:30

## 2025-04-25 RX ADMIN — CARBIDOPA AND LEVODOPA 2 TABLET: 25; 100 TABLET ORAL at 08:30

## 2025-04-25 ASSESSMENT — PAIN SCALES - GENERAL: PAINLEVEL_OUTOF10: 0

## 2025-04-25 NOTE — PLAN OF CARE
Problem: Physical Therapy - Adult  Goal: By Discharge: Performs mobility at highest level of function for planned discharge setting.  See evaluation for individualized goals.  Description: FUNCTIONAL STATUS PRIOR TO ADMISSION: Prior level of function unknown on evaluation. Patient with h/o PD and dementia, admitted for altered mental status.     HOME SUPPORT PRIOR TO ADMISSION: The patient lived with spouse who was not present on evaluation.    Physical Therapy Goals  Updated 4/24/2025 due to no goals met  1.  Patient will move from supine to sit and sit to supine, scoot up and down, and roll side to side in bed with moderate assist within 7 day(s).    2.  Patient will perform sit to stand with moderate assist within 7 day(s).  3.  Patient will transfer from bed to chair and chair to bed with moderate assist using the least restrictive device within 7 day(s).    Initiated 4/17/2025  1.  Patient will move from supine to sit and sit to supine, scoot up and down, and roll side to side in bed with contact guard assist within 7 day(s).    2.  Patient will perform sit to stand with contact guard assist within 7 day(s).  3.  Patient will transfer from bed to chair and chair to bed with contact guard assist using the least restrictive device within 7 day(s).  4.  Patient will ambulate with contact guard assist for 50 feet with the least restrictive device within 7 day(s).   5.  Patient will progress to stair training if applicable within 7 day(s).   Outcome: Progressing     PHYSICAL THERAPY TREATMENT    Patient: Aureliano Roth (74 y.o. male)  Date: 4/25/2025  Diagnosis:   Acute cystitis without hematuria [N30.00]  Altered mental status, unspecified altered mental status type [R41.82]  AMS (altered mental status) [R41.82] AMS (altered mental status)      Precautions:                        ASSESSMENT:  Pt tolerated PT services well and continues to progress toward PT POC goals. Progress to date less than anticipated  Measure for Post-Acute Care \"6-Clicks\" Basic Mobility Scores Predict Discharge Destination After Acute Care Hospitalization in [facility]: A Retrospective, Observational Study. Arch Rehabil Res Clin Transl. 2022 Jul 16;4(3):587227. doi: 10.1016/j.arrct.2022.565922. PMID: 11880405; PMCID: AGA1826050.  4. Galdino GRANADOS, Hawa S, Sweetie W, Ena NASCIMENTO. AM-PAC Short Forms Manual 4.0. Revised 2/2020.                                                                                                                                                                                                                              Pain Rating:  Intermittent s/s discomfort, but not rated.    Pain Intervention(s):   Discomfort at an acceptable level.    Activity Tolerance:   Good and Fair     After treatment:   Patient left in no apparent distress sitting up in chair, Call bell within reach, Bed/ chair alarm activated, Caregiver / family present, and Palliative Team present providing further care.      COMMUNICATION/EDUCATION:   The patient's plan of care was discussed with: occupational therapy assistant, registered nurse, and spouse, Palliative Team.    Patient Education  Education Given To: Patient;Family (spouse)  Education Provided: Role of Therapy;Transfer Training;Family Education;Equipment;Mobility Training;Plan of Care;Energy Conservation;Fall Prevention Strategies;Precautions;Orientation  Education Method: Demonstration;Verbal;Teach Back  Barriers to Learning: None (medical status)  Education Outcome: Verbalized understanding;Demonstrated understanding;Continued education needed      Dora Coates, PT, DPT

## 2025-04-25 NOTE — PLAN OF CARE
Problem: Risk for Elopement  Goal: Patient will not exit the unit/facility without proper excort  Outcome: Progressing     Problem: Discharge Planning  Goal: Discharge to home or other facility with appropriate resources  Outcome: Progressing     Problem: Safety - Adult  Goal: Free from fall injury  Outcome: Progressing     Problem: Confusion  Goal: Confusion, delirium, dementia, or psychosis is improved or at baseline  Description: INTERVENTIONS:1. Assess for possible contributors to thought disturbance, including medications, impaired vision or hearing, underlying metabolic abnormalities, dehydration, psychiatric diagnoses, and notify attending LIP2. [place] high risk fall precautions, as indicated3. Provide frequent short contacts to provide reality reorientation, refocusing and direction4. Decrease environmental stimuli, including noise as appropriate5. Monitor and intervene to maintain adequate nutrition, hydration, elimination, sleep and activity6. If unable to ensure safety without constant attention obtain sitter and review sitter guidelines with assigned personnel7. Initiate Psychosocial CNS and Spiritual Care consult, as indicated  INTERVENTIONS:1. Assess for possible contributors to thought disturbance, including medications, impaired vision or hearing, underlying metabolic abnormalities, dehydration, psychiatric diagnoses, and notify attending LIP2. [place] high risk fall precautions, as indicated3. Provide frequent short contacts to provide reality reorientation, refocusing and direction4. Decrease environmental stimuli, including noise as appropriate5. Monitor and intervene to maintain adequate nutrition, hydration, elimination, sleep and activity6. If unable to ensure safety without constant attention obtain sitter and review sitter guidelines with assigned personnel7. Initiate Psychosocial CNS and Spiritual Care consult, as indicated  Outcome: Progressing     Problem: Pain  Goal:  Verbalizes/displays adequate comfort level or baseline comfort level  Outcome: Progressing  Flowsheets (Taken 4/24/2025 2158)  Verbalizes/displays adequate comfort level or baseline comfort level: Assess pain using appropriate pain scale     Problem: Skin/Tissue Integrity  Goal: Skin integrity remains intact  Description: 1.  Monitor for areas of redness and/or skin breakdown2.  Assess vascular access sites hourly3.  Every 4-6 hours minimum:  Change oxygen saturation probe site4.  Every 4-6 hours:  If on nasal continuous positive airway pressure, respiratory therapy assess nares and determine need for appliance change or resting period  Outcome: Progressing  Flowsheets (Taken 4/24/2025 2158)  Skin Integrity Remains Intact: Assess vascular access sites hourly     Problem: Nutrition Deficit:  Goal: Optimize nutritional status  Outcome: Progressing

## 2025-04-25 NOTE — PLAN OF CARE
Problem: Occupational Therapy - Adult  Goal: By Discharge: Performs self-care activities at highest level of function for planned discharge setting.  See evaluation for individualized goals.  Description: FUNCTIONAL STATUS PRIOR TO ADMISSION:  patient unable to provide hx and family not present, per chart review hx of dementia and Parkinson's, confused at baseline but admitted with increased confusion from baseline   ,  ,  ,  ,  ,  ,  ,  ,  ,  ,       HOME SUPPORT: Patient lived with his wife per  chart.    Occupational Therapy Goals:  Initiated 4/17/2025, weekly re-assessment 4/24/25, continue all goals  1.  Patient will perform self-feeding with Moderate Assist within 7 day(s).  2.  Patient will follow 1 step commands > or = 50% within 7 day(s).  3.  Patient will maintain attention to functional task > or = 2 minutes within 7 day(s).  4.  Patient will sit edge of bed > or = 10 minutes with SBA for functional task within 7 day(s).  5.  Patient will stand pivot transfer to bedside commode or chair with Minimal Assist and Assist x2 within 7 day(s).    Outcome: Progressing   OCCUPATIONAL THERAPY TREATMENT  Patient: Aureliano Roth (74 y.o. male)  Date: 4/25/2025  Primary Diagnosis: Acute cystitis without hematuria [N30.00]  Altered mental status, unspecified altered mental status type [R41.82]  AMS (altered mental status) [R41.82]       Precautions:                  Chart, occupational therapy assessment, plan of care, and goals were reviewed.    ASSESSMENT  Patient continues to benefit from skilled OT services and is not progressing towards goals. Pt awake and speaking words however not understandable and with decreased command following, decreased sequencing, decreased task initiation deficits with increased time/effort and cues needed.  Strong posterior lean in sit (and stand) with increased hands on assist to maintain balance during face washing with multiple verbal and tactile cues to initiate task. Once he

## 2025-04-25 NOTE — PROGRESS NOTES
Palliative Medicine  Patient Name: Aureliano Roth  YOB: 1950  MRN: [id number]  Age: 74 y.o.  Gender: male    Date of Initial Consult: 4/17/2025  Date of Service: 4/17/2025  Time: 5:00 PM  Provider: BABAK Steele NP  Hospital Day: 10  Admit Date: 4/16/2025  Referring Provider: Dr. Aranda       Reasons for Consultation:  Goals of Care    HISTORY OF PRESENT ILLNESS (HPI):   Aureliano Roth is a 74 y.o. male with a past medical history of BPH, Parkinson's, dementia, intermittent paranoia/delusions/visual hallucination, hypothyroidism, AV insufficiency, s/p AV replacement, depression, HTN, bradycardia s/p pacemaker who presented to the ER with CC of worsening weakness and AMS and admitted on 4/16/2025 with a diagnosis of acute on chronic CHF, bilateral pleural effusions,.     Interval Hx-   4/16-4/19- confused, limited participation and intermittent agitation, requiring virtual sitter. Patient incontinent, unaware.   4/21 -Decreased agitation, however EEG results abnormal, showing generalized slowing during both drowsy and awake states.  4/22- OT /PT eval- significantly below baseline w/ decreased balance, slow progress 2/2 cognition and decreased strength,  requires mod 2 person assist w/ bed mobility,  sit --> stand and max assist Stand/pivot transfers. but note making slow progress.    4/23- Responds to some questions however some speech unintelligible, PT/OT  Mr. Roth  NOT making progress, now requiring Max/Total Ax 2 for functional mobility d/t increased rigidity and decreased command following. In the evening, nurse noted patient lethargic, w/ increased frequency of \"jerking\" movements in upper /lower extremities.      4/24- Loose stools this am, reportedly more awake early am, ate eggs, but requires assist w. Feeding. Mod morning,  PT/OT attempted to work w/ him, noted he was grabbing gown or therapists arm, not responding to verbal/tactile or manual cues, remained non verbal and

## 2025-04-25 NOTE — PROGRESS NOTES
a medical condition which requires positioning of the body in ways not feasible with an ordinary bed. The beneficiary requires the head of the bed to elevated more than 30 degrees most of the time due to concern for aspiration.     Mobitz type 1 second degree AV block, bradycardia s/p cardiac pacemaker in situ, Typical atrial flutter, Hx of aortic valve insufficiency, prolonged QTc  Continue home aspirin, high dose.  Not anticoagulated currently as risk of anticoagulation thought to be too high.     Hypothyroidism  Continue home Synthyroid dose.  TSH normal.     HTN  Currently not requiring any antihypertensives.     BPH  On Flomax in the past.  Not currently.     Adrenal insufficiency  Continue home fludrocortisone 0.1 mg daily.                 Care Plan discussed with: Family    Prophylaxis:  Lovenox    Disposition:  Home w/Family           ___________________________________________________    Attending Physician: Piter Rodriguez MD        Subjective:     Chief Complaint: Patient seen and examined at bedside.  Intermittent agitation noted.  Wife at bedside who tried to come patient down.  I had extensive conversation with wife at bedside, including wife's decision about potential discharge home with hospice.  Discussed with wife that current status may be related to delayed return to baseline after acute infection, versus progression of dementia.  Hospice informational pending.       Objective:       Vitals:     Last 24hrs VS reviewed since prior progress note. Most recent are:    Vitals:    04/25/25 1212   BP: 124/85   Pulse: 74   Resp: 18   Temp: 97.5 °F (36.4 °C)   SpO2: 95%     SpO2 Readings from Last 6 Encounters:   04/25/25 95%   11/16/23 96%          Intake/Output Summary (Last 24 hours) at 4/25/2025 1236  Last data filed at 4/24/2025 2158  Gross per 24 hour   Intake 118 ml   Output 700 ml   Net -582 ml          Exam:     Physical Exam:    Gen: Intermittent agitation.  Chronically ill-appearing.  HEENT:   NC/AT.   Resp:  Unlabored breathing. Clear breath sounds with good air entry.   Card:  Normal S1 and S2.   Abd:  Soft; limited exam as intermittently agitated.  Ext: No clubbing, cyanosis or edema.  Neuro: Intermittent agitation.  Limited physical examination otherwise.  Psych: Not done.    Medications Reviewed: (see below)    Lab Data Reviewed: (see below)  ______________________________________________________________________    Medications:     Current Facility-Administered Medications   Medication Dose Route Frequency    aspirin tablet 325 mg  325 mg Oral Daily    fludrocortisone (FLORINEF) tablet 0.1 mg  0.1 mg Oral Daily    lactobacillus (CULTURELLE) capsule 1 capsule  1 capsule Oral Daily with breakfast    miconazole (MICOTIN) 2 % powder   Topical BID    midazolam PF (VERSED) injection 0.5 mg  0.5 mg IntraVENous Q8H PRN    prochlorperazine (COMPAZINE) injection 10 mg  10 mg IntraVENous Q6H PRN    sodium chloride flush 0.9 % injection 5-40 mL  5-40 mL IntraVENous 2 times per day    sodium chloride flush 0.9 % injection 5-40 mL  5-40 mL IntraVENous PRN    0.9 % sodium chloride infusion   IntraVENous PRN    potassium chloride (KLOR-CON) extended release tablet 40 mEq  40 mEq Oral PRN    Or    potassium bicarb-citric acid (EFFER-K) effervescent tablet 40 mEq  40 mEq Oral PRN    Or    potassium chloride 10 mEq/100 mL IVPB (Peripheral Line)  10 mEq IntraVENous PRN    magnesium sulfate 2000 mg in 50 mL IVPB premix  2,000 mg IntraVENous PRN    enoxaparin (LOVENOX) injection 40 mg  40 mg SubCUTAneous Daily    polyethylene glycol (GLYCOLAX) packet 17 g  17 g Oral Daily PRN    acetaminophen (TYLENOL) tablet 650 mg  650 mg Oral Q6H PRN    Or    acetaminophen (TYLENOL) suppository 650 mg  650 mg Rectal Q6H PRN    donepezil (ARICEPT) tablet 10 mg  10 mg Oral QHS    FLUoxetine (PROZAC) capsule 10 mg  10 mg Oral Daily    levothyroxine (SYNTHROID) tablet 112 mcg  112 mcg Oral Daily    carbidopa-levodopa (SINEMET)  MG

## 2025-04-25 NOTE — CARE COORDINATION
Case Management Progress Note:      Discharge Plan:  Anticipate discharging to home on Sunday, 4/27 with the following services:  CM met with spouse three times this morning to discuss discharge planning updates.      (1)  [facility]--Accepted.  Plan to meet with patient and family (spouse & son) at 1300.        (2)  Personal Care Services:  Patient's spouse stated that she is going to contact [facility] personal care agency today to inquire on their services and cost.  Otherwise, she is leaning towards [facility].  CM confirmed with [facility] Liaison that they are able to provide services this weekend.  Spouse is aware.        (3)  Transportation:  Stretcher      Will continue to follow.    ______________________________________  KAMRAN Sanabria, RN-CM  [facility]- Care Management  Available via [employer]  4/25/2025  12:50 PM

## 2025-04-26 PROCEDURE — 2500000003 HC RX 250 WO HCPCS: Performed by: STUDENT IN AN ORGANIZED HEALTH CARE EDUCATION/TRAINING PROGRAM

## 2025-04-26 PROCEDURE — 6370000000 HC RX 637 (ALT 250 FOR IP): Performed by: STUDENT IN AN ORGANIZED HEALTH CARE EDUCATION/TRAINING PROGRAM

## 2025-04-26 PROCEDURE — 6370000000 HC RX 637 (ALT 250 FOR IP): Performed by: FAMILY MEDICINE

## 2025-04-26 PROCEDURE — 1100000000 HC RM PRIVATE

## 2025-04-26 PROCEDURE — 6360000002 HC RX W HCPCS: Performed by: STUDENT IN AN ORGANIZED HEALTH CARE EDUCATION/TRAINING PROGRAM

## 2025-04-26 PROCEDURE — 94761 N-INVAS EAR/PLS OXIMETRY MLT: CPT

## 2025-04-26 RX ORDER — TIZANIDINE 2 MG/1
4 TABLET ORAL EVERY 8 HOURS PRN
Status: DISCONTINUED | OUTPATIENT
Start: 2025-04-26 | End: 2025-04-26

## 2025-04-26 RX ORDER — LORAZEPAM 2 MG/ML
1 CONCENTRATE ORAL EVERY 4 HOURS PRN
Status: DISCONTINUED | OUTPATIENT
Start: 2025-04-26 | End: 2025-04-27 | Stop reason: HOSPADM

## 2025-04-26 RX ADMIN — CARBIDOPA AND LEVODOPA 2 TABLET: 25; 100 TABLET ORAL at 08:43

## 2025-04-26 RX ADMIN — FLUOXETINE HYDROCHLORIDE 10 MG: 10 CAPSULE ORAL at 08:43

## 2025-04-26 RX ADMIN — CARBIDOPA AND LEVODOPA 2 TABLET: 25; 100 TABLET ORAL at 14:32

## 2025-04-26 RX ADMIN — LEVOTHYROXINE SODIUM 112 MCG: 0.11 TABLET ORAL at 06:12

## 2025-04-26 RX ADMIN — SODIUM CHLORIDE, PRESERVATIVE FREE 10 ML: 5 INJECTION INTRAVENOUS at 21:39

## 2025-04-26 RX ADMIN — FLUDROCORTISONE ACETATE 0.1 MG: 0.1 TABLET ORAL at 08:43

## 2025-04-26 RX ADMIN — MICONAZOLE NITRATE: 2 POWDER TOPICAL at 08:43

## 2025-04-26 RX ADMIN — ASPIRIN 325 MG: 325 TABLET ORAL at 08:43

## 2025-04-26 RX ADMIN — CARBIDOPA AND LEVODOPA 2 TABLET: 25; 100 TABLET ORAL at 21:41

## 2025-04-26 RX ADMIN — DONEPEZIL HYDROCHLORIDE 10 MG: 5 TABLET, FILM COATED ORAL at 21:40

## 2025-04-26 RX ADMIN — SODIUM CHLORIDE, PRESERVATIVE FREE 10 ML: 5 INJECTION INTRAVENOUS at 08:43

## 2025-04-26 RX ADMIN — SODIUM CHLORIDE, PRESERVATIVE FREE 10 ML: 5 INJECTION INTRAVENOUS at 21:40

## 2025-04-26 RX ADMIN — PANTOPRAZOLE SODIUM 40 MG: 40 TABLET, DELAYED RELEASE ORAL at 06:12

## 2025-04-26 RX ADMIN — ENOXAPARIN SODIUM 40 MG: 100 INJECTION SUBCUTANEOUS at 08:43

## 2025-04-26 RX ADMIN — Medication 1 CAPSULE: at 08:43

## 2025-04-26 RX ADMIN — Medication 6 MG: at 21:43

## 2025-04-26 RX ADMIN — ACETAMINOPHEN 650 MG: 325 TABLET ORAL at 10:55

## 2025-04-26 RX ADMIN — MICONAZOLE NITRATE: 2 POWDER TOPICAL at 21:38

## 2025-04-26 ASSESSMENT — PAIN SCALES - GENERAL: PAINLEVEL_OUTOF10: 0

## 2025-04-26 NOTE — PLAN OF CARE
Problem: Safety - Adult  Goal: Free from fall injury  Outcome: Progressing     Problem: Pain  Goal: Verbalizes/displays adequate comfort level or baseline comfort level  Outcome: Progressing     Problem: Skin/Tissue Integrity  Goal: Skin integrity remains intact  Description: 1.  Monitor for areas of redness and/or skin breakdown2.  Assess vascular access sites hourly3.  Every 4-6 hours minimum:  Change oxygen saturation probe site4.  Every 4-6 hours:  If on nasal continuous positive airway pressure, respiratory therapy assess nares and determine need for appliance change or resting period  Outcome: Progressing

## 2025-04-26 NOTE — PROGRESS NOTES
[facility]  [address] [place], [place] [id number] [phone]      Medical Progress Note      NAME: Aureliano Roth   :  1950  MRM:  [id number]    Date/Time: 2025  11:18 AM           Assessment / Plan:   Aureliano is a 74 years old male patient with PMH of BPH, Mobitz type 1 second degree AV block, bradycardia s/p cardiac pacemaker in situ, atrial flutter, Hx of aortic valve insufficiency, Parkinson disease, Dementia due to Parkinson's disease, Hypothyroid, Ankylosing spondylitis, Depression who presented to the emergency room accompanied by his wife with main complaint of worsening generalized weakness and altered mental status.     Acute encephalopathy, including metabolic  Suspect exacerbation at by acute infection as below, with differentials including progression of dementia, as well as delirium as his symptoms were waxing and waning.  Managed in coordination with neurology: EEG obtained with no epileptic forms.  CT scan of the head with no acute pathology.  MRI of the brain could not be obtained given pacemaker.  No other obvious metabolic causes found including noted TSH and ammonia levels being normal.  Updated wife again at bedside today.  Patient still far away from baseline.  Still with intermittent agitation.  Wife proceeding with plans to discharge home tomorrow with hospice.  I have ordered hospice medications since this will be a weekend discharge, and wife has already collected the medications.   Wife expressed concerns about potential stiff jaw and whether a muscle relaxant will be helpful; evaluated to start on tizanidine however patient already has prolonged Qtc; further assessed with pharmacy to give methocarbamol as an alternative but will hold for now and monitor as I believe the patient certainly currently has low threshold for seizures.    Acute UTI, POA  Klebsiella UTI  Leukocytosis  Leukocytosis resolved, and has completed antibiotic

## 2025-04-26 NOTE — PROGRESS NOTES
Spiritual Health History and Assessment/Progress Note  [facility]    Palliative Care,  ,  ,      Name: Aureliano Roth MRN: [id number]    Age: 74 y.o.     Sex: male   Language: English   Gnosticism: Druze   AMS (altered mental status)     Date: 4/26/2025            Total Time Calculated: 10 min              Spiritual Assessment continued in [facility] A3 MULTI-SPECIALTY TELEMETRY        Referral/Consult From:  (CM referral)   Encounter Overview/Reason: Palliative Care  Service Provided For: Patient    Sharona, Belief, Meaning:   Patient unable to assess at this time  Family/Friends No family/friends present      Importance and Influence:  Patient unable to assess at this time  Family/Friends No family/friends present    Community:  Patient feels well-supported. Support system includes: Spouse/Partner  Family/Friends No family/friends present    Assessment and Plan of Care:   Patient Interventions include: Other: ministry of presence  Family/Friends Interventions include: No family/friends present    Patient Plan of Care: Spiritual Care available upon further referral  Family/Friends Plan of Care: No family/friends present    Received and responded to CM referral to visit patient. Per CM, patient to go home with Hospice on 4/27. Chart review. Introduced self to patient. It was difficult to speak with patient. Patient appeared confused, sad, and soft spoken. Chart indicates patient is Druze. Patient's  provides spiritual support to patient. No family present at bedside. Patient has compromised coping. Provided supportive presence. Patient had difficulty answering questions. Please contact spiritual health services for further assistance needed.    Electronically signed by PAUL Olguin on 4/26/2025 at 1:15 PM

## 2025-04-27 VITALS
SYSTOLIC BLOOD PRESSURE: 153 MMHG | HEART RATE: 73 BPM | BODY MASS INDEX: 25.05 KG/M2 | WEIGHT: 184.97 LBS | OXYGEN SATURATION: 96 % | RESPIRATION RATE: 17 BRPM | TEMPERATURE: 98.1 F | DIASTOLIC BLOOD PRESSURE: 104 MMHG | HEIGHT: 72 IN

## 2025-04-27 PROCEDURE — 6370000000 HC RX 637 (ALT 250 FOR IP): Performed by: STUDENT IN AN ORGANIZED HEALTH CARE EDUCATION/TRAINING PROGRAM

## 2025-04-27 PROCEDURE — 94761 N-INVAS EAR/PLS OXIMETRY MLT: CPT

## 2025-04-27 PROCEDURE — 2500000003 HC RX 250 WO HCPCS: Performed by: STUDENT IN AN ORGANIZED HEALTH CARE EDUCATION/TRAINING PROGRAM

## 2025-04-27 PROCEDURE — 6370000000 HC RX 637 (ALT 250 FOR IP): Performed by: FAMILY MEDICINE

## 2025-04-27 RX ADMIN — FLUOXETINE HYDROCHLORIDE 10 MG: 10 CAPSULE ORAL at 08:32

## 2025-04-27 RX ADMIN — PANTOPRAZOLE SODIUM 40 MG: 40 TABLET, DELAYED RELEASE ORAL at 06:48

## 2025-04-27 RX ADMIN — MICONAZOLE NITRATE: 2 POWDER TOPICAL at 08:32

## 2025-04-27 RX ADMIN — SODIUM CHLORIDE, PRESERVATIVE FREE 10 ML: 5 INJECTION INTRAVENOUS at 08:33

## 2025-04-27 RX ADMIN — CARBIDOPA AND LEVODOPA 2 TABLET: 25; 100 TABLET ORAL at 08:32

## 2025-04-27 RX ADMIN — Medication 1 CAPSULE: at 06:48

## 2025-04-27 RX ADMIN — LEVOTHYROXINE SODIUM 112 MCG: 0.11 TABLET ORAL at 06:48

## 2025-04-27 RX ADMIN — FLUDROCORTISONE ACETATE 0.1 MG: 0.1 TABLET ORAL at 08:32

## 2025-04-27 ASSESSMENT — PAIN SCALES - GENERAL: PAINLEVEL_OUTOF10: 0

## 2025-04-27 NOTE — CARE COORDINATION
Case Management Discharge Note    Discharge Plan:  Patient discharging to home today with [facility]--accepted.  SOC date:  today, 4/27.     Transportation:  Hospital to Home [phone].  Pick-up time:  1000.    DC packet is in the chart.  DNR form completed and signed by MD.        KANA met with patient, patient's spouse, Peg Roth, and patient's son Ray Crow at bedside to discuss discharge planning updates.    KANA confirmed with family and [facility] weekend nurse that the following DMEs were delivered yesterday to patient's home:  Hospital Bed, Side Table, Wheelchair, and Oxygen.        Personal Care Services:  KANA confirmed with patient's spouse, that she still has not made up her mind on which agency to use.  Either, [facility] vs [facility].  She stated that she will do more research and has the numbers to call, once she makes up her mind.      Patient's family stated no other needs and stated full understanding of the discharge plan.  Medical treatment team informed of updates.    ______________________________________  KAMRAN Sanabria, RN-CM  [facility]- Care Management  Available via [employer]  4/27/2025  1:24 PM

## 2025-04-27 NOTE — DISCHARGE INSTRUCTIONS
HOSPITALIST DISCHARGE INSTRUCTIONS  NAME: Aureliano Roth   :  1950   MRN:  [id number]     Date/Time:  2025 8:56 AM    ADMIT DATE: 2025     DISCHARGE DATE: 2025     DISCHARGE DIAGNOSIS and DISCHARGE INSTRUCTIONS: Discharging home after an admission for worsening confusion thought to be related to infection + progression of dementia. Treated with antibiotics with completion. Mentation did not return to baseline. Discharging home with hospice.    Follow Up: With Hospice.    MEDICATIONS:    It is important that you take the medication exactly as they are prescribed.   Keep your medication in the bottles provided by the pharmacist and keep a list of the medication names, dosages, and times to be taken in your wallet.   Do not take other medications without consulting your doctor.     Current Discharge Medication List        START taking these medications    Details   LORazepam (ATIVAN) 2 MG/ML concentrated solution Take 0.5 mLs by mouth every hour as needed for Agitation, Anxiety or Shortness of Breath for up to 14 days. Max Daily Amount: 24 mg  Qty: 168 mL, Refills: 0  Start date: 2025, End date: 2025    Associated Diagnoses: Palliative care encounter      promethazine (PHENERGAN) 25 MG tablet Take 1 tablet by mouth 4 times daily as needed for Nausea  Qty: 20 tablet, Refills: 0  Start date: 2025, End date: 2025      hyoscyamine (LEVSIN/SL) 0.125 MG sublingual tablet Place 1 tablet under the tongue every 4 hours as needed for Cramping  Qty: 20 tablet, Refills: 0  Start date: 2025      morphine sulfate 20 MG/ML concentrated oral solution Take 0.25 mLs by mouth every hour as needed for Pain for up to 10 days. Max Daily Amount: 120 mg  Qty: 5 mL, Refills: 0  Start date: 2025, End date: 2025    Comments: Reduce doses taken as pain becomes manageable  Associated Diagnoses: Palliative care encounter           CONTINUE these medications which have NOT CHANGED    Details

## 2025-04-27 NOTE — PLAN OF CARE
Problem: Risk for Elopement  Goal: Patient will not exit the unit/facility without proper excort  4/27/2025 1005 by Dulce Zavala RN  Outcome: Progressing  4/27/2025 0131 by Carolina Caro RN  Outcome: Progressing     Problem: Safety - Adult  Goal: Free from fall injury  4/27/2025 1005 by Dulce Zavala RN  Outcome: Progressing  4/27/2025 0131 by Carolina Caro RN  Outcome: Progressing     Problem: Pain  Goal: Verbalizes/displays adequate comfort level or baseline comfort level  4/27/2025 1005 by Dulce Zavala RN  Outcome: Progressing  4/27/2025 0131 by Carolina Caro RN  Outcome: Progressing

## 2025-04-27 NOTE — PLAN OF CARE
Problem: Risk for Elopement  Goal: Patient will not exit the unit/facility without proper excort  Outcome: Progressing     Problem: Discharge Planning  Goal: Discharge to home or other facility with appropriate resources  Outcome: Progressing     Problem: Safety - Adult  Goal: Free from fall injury  4/27/2025 0131 by Carolina Caro, RN  Outcome: Progressing  4/26/2025 1134 by Dulce Zavala RN  Outcome: Progressing     Problem: Confusion  Goal: Confusion, delirium, dementia, or psychosis is improved or at baseline  Description: INTERVENTIONS:1. Assess for possible contributors to thought disturbance, including medications, impaired vision or hearing, underlying metabolic abnormalities, dehydration, psychiatric diagnoses, and notify attending LIP2. Pella high risk fall precautions, as indicated3. Provide frequent short contacts to provide reality reorientation, refocusing and direction4. Decrease environmental stimuli, including noise as appropriate5. Monitor and intervene to maintain adequate nutrition, hydration, elimination, sleep and activity6. If unable to ensure safety without constant attention obtain sitter and review sitter guidelines with assigned personnel7. Initiate Psychosocial CNS and Spiritual Care consult, as indicated  INTERVENTIONS:1. Assess for possible contributors to thought disturbance, including medications, impaired vision or hearing, underlying metabolic abnormalities, dehydration, psychiatric diagnoses, and notify attending LIP2. Pella high risk fall precautions, as indicated3. Provide frequent short contacts to provide reality reorientation, refocusing and direction4. Decrease environmental stimuli, including noise as appropriate5. Monitor and intervene to maintain adequate nutrition, hydration, elimination, sleep and activity6. If unable to ensure safety without constant attention obtain sitter and review sitter guidelines with assigned personnel7. Initiate Psychosocial CNS and

## 2025-04-27 NOTE — DISCHARGE SUMMARY
Physician Discharge Summary     Patient ID:  Aureliano Roth  [id number]  74 y.o.  1950    Admit date: 4/16/2025    Discharge date and time: 4/27/2025    HPI: Aureliano is a 74 years old male patient with PMH of BPH, Mobitz type 1 second degree AV block, bradycardia s/p cardiac pacemaker in situ, atrial flutter, Hx of aortic valve insufficiency, Parkinson disease, Dementia due to Parkinson's disease, Hypothyroid, Ankylosing spondylitis, Depression who presented to the emergency room accompanied by his wife with main complaint of worsening generalized weakness and altered mental status.     Admission Diagnoses: Acute cystitis without hematuria [N30.00]  Altered mental status, unspecified altered mental status type [R41.82]  AMS (altered mental status) [R41.82]    Discharge Diagnoses and Hospital Course:     Aureliano is a 74 years old male patient with PMH of BPH, Mobitz type 1 second degree AV block, bradycardia s/p cardiac pacemaker in situ, atrial flutter, Hx of aortic valve insufficiency, Parkinson disease, Dementia due to Parkinson's disease, Hypothyroid, Ankylosing spondylitis, Depression who presented to the emergency room accompanied by his wife with main complaint of worsening generalized weakness and altered mental status.     Acute encephalopathy, including metabolic  Suspect exacerbation at by acute infection as below, with differentials including progression of dementia, as well as delirium as his symptoms were waxing and waning.  Managed in coordination with neurology: EEG obtained with no epileptic forms.  CT scan of the head with no acute pathology.  MRI of the brain could not be obtained given pacemaker.  No other obvious metabolic causes found including noted TSH and ammonia levels being normal.  Updated wife at bedside today with plans to discharge home today with hospice.    Reported left earache/27  For today, wife explained that patient had been complaining of left-sided earache;

## 2025-05-02 NOTE — PROGRESS NOTES
Physician Progress Note      PATIENT:               ELIE VENEGAS  CSN #:                  [id number]  :                       1950  ADMIT DATE:       2025 5:21 PM  DISCH DATE:        2025 10:17 AM  RESPONDING  PROVIDER #:        Piter Rodriguez MD          QUERY TEXT:    acute uti is documented in the medical record dc summary .  Please clarify   any associated diagnoses:    The clinical indicators include:  admitted  with acute cystitis. Labs that day showed a WBC of 18.6 and   patient was noted to have changes in mentation. Urine culture grew Klebsiella.   Patient had a temp of 102.4 Axillary within 24 hours of admission. They were   treated with Rocephin.  Options provided:  -- Sepsis, present on admission  -- Localized infection without sepsis  -- Other - I will add my own diagnosis  -- Disagree - Not applicable / Not valid  -- Disagree - Clinically unable to determine / Unknown  -- Refer to Clinical Documentation Reviewer    PROVIDER RESPONSE TEXT:    This patient has sepsis which was present on admission.    Query created by: Nusrat Rhodes on 2025 9:06 AM      QUERY TEXT:    \"Acute encephalopathy, including metabolic\" is documented in the medical   record  Dc summary. Please provide additional clinical indicators   supportive of your documentation. Or please document if the diagnosis of Acute   encephalopathy has been ruled out after study.    The clinical indicators include:  Adm with acute uti. hx parkinsons, dementia      17 nursing-admitted for increased confusion and weakness with recent GLF at   home related to UTI. HX of dementia and parkinson's so patient's baseline is   confused but wife who is at bedside states he's more confused than usual.   Virtual sitter  set up for extra safety due to confusion/high fall   risk.   DC summary-Acute encephalopathy, including metabolic,  Suspect   exacerbation at by acute infection as below, with differentials
